# Patient Record
Sex: MALE | Race: BLACK OR AFRICAN AMERICAN
[De-identification: names, ages, dates, MRNs, and addresses within clinical notes are randomized per-mention and may not be internally consistent; named-entity substitution may affect disease eponyms.]

---

## 2017-01-20 NOTE — DIAGNOSTIC IMAGING REPORT
Indication: Chest pain



Technique:  Single portable AP view of the chest.



Findings:



Comparison:  10/21/16



The bones and extra pulmonary soft tissues, cardiomediastinal silhouette, 

pulmonary

vasculature and parenchyma, and pleural surfaces remain unremarkable.



IMPRESSION:



Negative portable AP chest, unchanged.

## 2017-01-20 NOTE — EMERGENCY ROOM REPORT
History of Present Illness


General


Chief Complaint:  Chest Pain


Source:  Patient, Medical Record





Present Illness


HPI


51-year-old male presents ED complaining of chest pain earache states symptoms 

started this morning while asleep, woke him up.  Pain is sharp, midsternal, 10 

out of 10.  Worse with movement.  Admits to shortness of breath.  No other 

aggravating relieving factors.  Patient states he also "passed out".  Patient 

has history of heart disease and syncope.  Denies smoking or drug use.  PMD is 

Dr. Bryson.


Allergies:  


Coded Allergies:  


     KETOROLAC (Unverified  Adverse Reaction, Intermediate, n/v, 1/12/15)


Uncoded Allergies:  


     TORODOL (Allergy, Unknown, 10/21/16)





Patient History


Past Medical History:  HTN, CVA/TIA


Past Surgical History:  other - spinal fusion


Pertinent Family History:  none


Social History:  Denies: alcohol use, drug use, smoking


Immunizations:  UTD


Reviewed Nursing Documentation:  PMH: Agreed, PSxH: Agreed





Nursing Documentation-PMH


Past Medical History:  No History, Except For


Hx Cardiac Problems:  Yes


Hx Hypertension:  Yes


Hx Cancer:  No


Hx Gastrointestinal Problems:  No


Hx Neurological Problems:  Yes - spinal fusion


Hx Cerebrovascular Accident:  Yes - spinal fusion and stroke 2011 (left sided 

deficit)


Hx Neurologic Surgery:  Yes - brain surgery at [facility]





Review of Systems


All Other Systems:  negative except mentioned in HPI





Physical Exam





Vital Signs








  Date Time  Temp Pulse Resp B/P Pulse Ox O2 Delivery O2 Flow Rate FiO2


 


1/20/17 12:15 98.1 70 16 179/102 99 Room Air  








Sp02 EP Interpretation:  reviewed, normal


General Appearance:  no apparent distress, alert, GCS 15, non-toxic


Head:  normocephalic, atraumatic


Eyes:  bilateral eye PERRL, bilateral eye normal inspection


ENT:  hearing grossly normal, normal pharynx, no angioedema, normal voice


Neck:  full range of motion, supple/symm/no masses


Respiratory:  chest non-tender, lungs clear, normal breath sounds, speaking 

full sentences


Cardiovascular #1:  regular rate, rhythm, no edema


Cardiovascular #2:  2+ carotid (R), 2+ carotid (L), 2+ radial (R), 2+ radial (L)

, 2+ dorsalis pedis (R), 2+ dorsalis pedis (L)


Gastrointestinal:  normal bowel sounds, non tender, soft, non-distended, no 

guarding, no rebound


Rectal:  deferred


Genitourinary:  normal inspection, no CVA tenderness


Musculoskeletal:  back normal, gait/station normal, normal range of motion, non-

tender


Neurologic:  alert, oriented x3, responsive, motor strength/tone normal, 

sensory intact, speech normal


Psychiatric:  judgement/insight normal, memory normal, mood/affect normal, no 

suicidal/homicidal ideation


Reflexes:  3+ bicep (R), 3+ bicep (L), 3+ tricep (R), 3+ tricep (L), 3+ knee (R)

, 3+ knee (L)


Skin:  normal color, no rash, warm/dry, well hydrated


Lymphatic:  no adenopathy





Medical Decision Making


Diagnostic Impression:  


 Primary Impression:  


 ACS (acute coronary syndrome)


 Additional Impression:  


 Syncope


 Qualified Codes:  R55 - Syncope and collapse


ER Course


Hospital Course 


51-year-old male presents ED complaining of chest pain, syncope





Differential diagnoses include: MI/unstable angina, contusion, muscle strain, 

PTX, rib fracture





Clinical course


Patient placed on stretcher. on cardiac monitor. After initial history and 

physical I ordered labs, EKG, chest x-ray, ASA, morphine 





labs reviewed- no leukocytosis, hb/hct stable, electrolytes ok, trop negative





Chest x-ray- no acute process





Given risk factors and presentation I believe patient should be admitted.





Case discussed with Dr. Bryson and he agreed to accept the patient to his 

service for further care and support





I.  I feel this is a highly complex case requiring extensive working including 

EKG/Rhythm strip, Xray/CT/US, Blood/urine lab work, repeat exams while in ED, 

and administration of strong opiates/narcotics for pain control, admission to 

hospital or close patient follow up.  





Diagnosis - ACS, syncope





admitted to telemetry in serious condition





Labs








Test


  1/20/17


12:48


 


White Blood Count


  7.9 K/UL


(4.8-10.8)


 


Red Blood Count


  4.94 M/UL


(4.70-6.10)


 


Hemoglobin


  14.7 G/DL


(14.2-18.0)


 


Hematocrit


  46.3 %


(42.0-52.0)


 


Mean Corpuscular Volume 94 FL (80-99) 


 


Mean Corpuscular Hemoglobin


  29.7 PG


(27.0-31.0)


 


Mean Corpuscular Hemoglobin


Concent 31.7 G/DL


(32.0-36.0)


 


Red Cell Distribution Width


  13.3 %


(11.6-14.8)


 


Platelet Count


  192 K/UL


(150-450)


 


Mean Platelet Volume


  10.9 FL


(6.5-10.1)


 


Neutrophils (%) (Auto)


  72.2 %


(45.0-75.0)


 


Lymphocytes (%) (Auto)


  17.1 %


(20.0-45.0)


 


Monocytes (%) (Auto)


  5.5 %


(1.0-10.0)


 


Eosinophils (%) (Auto)


  3.9 %


(0.0-3.0)


 


Basophils (%) (Auto)


  1.3 %


(0.0-2.0)


 


Sodium Level


  140 mEQ/L


(135-145)


 


Potassium Level


  3.9 mEQ/L


(3.4-4.9)


 


Chloride Level


  99 mEQ/L


()


 


Carbon Dioxide Level


  26 mEQ/L


(20-30)


 


Anion Gap 15 (5-15) 


 


Blood Urea Nitrogen


  12 mg/dL


(7-23)


 


Creatinine


  1.7 mg/dL


(0.7-1.2)


 


Estimat Glomerular Filtration


Rate 52.2 mL/min


(>60)


 


Glucose Level


  107 mg/dL


()


 


Calcium Level


  9.7 mg/dL


(8.6-10.2)


 


Total Bilirubin


  0.6 mg/dL


(0.0-1.2)


 


Aspartate Amino Transf


(AST/SGOT) 14 U/L (5-40) 


 


 


Alanine Aminotransferase


(ALT/SGPT) 9 U/L (3-41) 


 


 


Alkaline Phosphatase


  35 U/L


()


 


Total Creatine Kinase


  316 U/L


()


 


Creatine Kinase MB


  1.9 ng/mL (<


6.7)


 


Creatine Kinase MB Relative


Index 0.6 


 


 


Troponin I


  < 0.30 ng/mL


(<=0.30)


 


Pro-B-Type Natriuretic Peptide


  261 pg/mL


(0-125)


 


Total Protein


  7.8 g/dL


(6.6-8.7)


 


Albumin


  4.8 g/dL


(3.5-5.2)


 


Globulin 3.0 g/dL 


 


Albumin/Globulin Ratio 1.6 (1.0-2.7) 


 


Urine Opiates Screen


  Negative


(NEGATIVE)


 


Urine Barbiturates Screen


  Negative


(NEGATIVE)


 


Phencyclidine (PCP) Screen


  Negative


(NEGATIVE)


 


Urine Amphetamines Screen


  Negative


(NEGATIVE)


 


Urine Benzodiazepines Screen


  Negative


(NEGATIVE)


 


Urine Cocaine Screen


  Negative


(NEGATIVE)


 


Urine Marijuana (THC) Screen


  Positive


(NEGATIVE)








EKG Diagnostic Results


Rate:  normal


Rhythm:  NSR


ST Segments:  no acute changes


ASA given to the pt in ED:  Yes





Rhythm Strip Diag. Results


EP Interpretation:  yes


Rhythm:  NSR, no PVC's, no ectopy





Chest X-Ray Diagnostic Results


EP Interpretation:  Yes


Findings:  no consolidation, no effusion, no pneumothorax, no acute 

cardiopulmonary disease


Number of Views:  1





Last Vital Signs








  Date Time  Temp Pulse Resp B/P Pulse Ox O2 Delivery O2 Flow Rate FiO2


 


1/20/17 13:45  66 18 175/106 99 Room Air  


 


1/20/17 12:15 98.1       








Status:  improved


Disposition:  ADMITTED AS INPATIENT


Condition:  Serious


Referrals:  


KHURRAM BRYSON (PCP)











JOCELYNN FU M.D. Jan 20, 2017 15:02

## 2017-01-21 NOTE — CARDIAC ELECTROPHYSIOLOGY PN
Subjective


Subjective


[id number]





Objective





Last 24 Hour Vital Signs








  Date Time  Temp Pulse Resp B/P Pulse Ox O2 Delivery O2 Flow Rate FiO2


 


1/21/17 16:00 98.1 64 20 139/94 99 Room Air  


 


1/21/17 12:55 97.7       


 


1/21/17 12:55 97.7       


 


1/21/17 12:00  61      


 


1/21/17 09:06    132/66    


 


1/21/17 08:00  75      


 


1/21/17 08:00 97.7 81 17 130/66 95 Room Air  


 


1/21/17 04:12 97.7 58 20 149/102 97 Room Air  


 


1/21/17 04:00  70      


 


1/21/17 01:21 98.0 70 20 150/100 98 Room Air  


 


1/20/17 20:00  65      

















Intake and Output  


 


 1/20/17 1/21/17





 19:00 07:00


 


Intake Total  60 ml


 


Balance  60 ml


 


  


 


Intake Oral  60 ml


 


# Voids 1 











Laboratory Tests








Test


  1/21/17


06:35 1/21/17


12:00


 


White Blood Count


  6.8 K/UL


(4.8-10.8) 


 


 


Red Blood Count


  4.57 M/UL


(4.70-6.10)  L 


 


 


Hemoglobin


  13.7 G/DL


(14.2-18.0)  L 


 


 


Hematocrit


  43.8 %


(42.0-52.0) 


 


 


Mean Corpuscular Volume 96 FL (80-99)   


 


Mean Corpuscular Hemoglobin


  30.0 PG


(27.0-31.0) 


 


 


Mean Corpuscular Hemoglobin


Concent 31.2 G/DL


(32.0-36.0)  L 


 


 


Red Cell Distribution Width


  13.0 %


(11.6-14.8) 


 


 


Platelet Count


  172 K/UL


(150-450) 


 


 


Mean Platelet Volume


  9.4 FL


(6.5-10.1) 


 


 


Neutrophils (%) (Auto)


  64.5 %


(45.0-75.0) 


 


 


Lymphocytes (%) (Auto)


  22.8 %


(20.0-45.0) 


 


 


Monocytes (%) (Auto)


  6.2 %


(1.0-10.0) 


 


 


Eosinophils (%) (Auto)


  5.8 %


(0.0-3.0)  H 


 


 


Basophils (%) (Auto)


  0.8 %


(0.0-2.0) 


 


 


Sodium Level


  144 mEQ/L


(135-145) 


 


 


Potassium Level


  4.3 mEQ/L


(3.4-4.9) 


 


 


Chloride Level


  102 mEQ/L


() 


 


 


Carbon Dioxide Level


  27 mEQ/L


(20-30) 


 


 


Anion Gap 15 (5-15)   


 


Blood Urea Nitrogen


  12 mg/dL


(7-23) 


 


 


Creatinine


  1.6 mg/dL


(0.7-1.2)  H 


 


 


Estimat Glomerular Filtration


Rate 56.0 mL/min


(>60) 


 


 


Glucose Level


  91 mg/dL


() 


 


 


Calcium Level


  9.4 mg/dL


(8.6-10.2) 


 


 


Troponin I


  < 0.30 ng/mL


(<=0.30) < 0.30 ng/mL


(<=0.30)

















RAMÓN LOPEZ Jan 21, 2017 17:40

## 2017-01-21 NOTE — CONSULTATION
DATE OF CONSULTATION:  01/21/2017



PULMONARY CONSULTATION



HISTORY OF PRESENT ILLNESS:  This is a 51-year-old male who came to

the hospital with chest pain.  He also reports he has earache. The patient

states that this rattling in his chest and discomfort woke him up from

sleep.  He also states he passed out.



PAST MEDICAL HISTORY:  Hypertension, CVA, spinal fusion.



PAST SURGICAL HISTORY:  He previously had tracheostomy in 2011 after

a complicated stroke.



LIST OF MEDICATIONS:  Include Neurontin, Protonix, lisinopril,

aspirin, Naprosyn, and tramadol.



ALLERGIES:  Listed to Toradol.



SOCIAL HISTORY:  Lives at home.  Denies alcohol tobacco use.  He has

a part-time caregiver.



REVIEW OF SYSTEMS:  Denies any headaches, hematemesis, melena,

hematochezia, night sweats, or weight loss.



PHYSICAL EXAMINATION:

GENERAL:  Reveals a 51-year-old male

VITAL SIGNS:  Blood pressure 130/60, heart rate 94, respiration 18,

afebrile, O2 saturation 95% on room air.

HEENT:  Unremarkable.  He has a well-healed tracheostomy scar on his

upper chest.

CHEST:  Clear breath sounds bilaterally.

ABDOMEN:  Soft. There is no edema.

EXTREMITIES:  His left upper and left lower extremity appear to be

weak with 4+/5 power.



LABORATORY AND DIAGNOSTIC DATA:  Normal CBC.  BMP is also

unremarkable except creatinine 1.6.  Toxicology screen positive for

marijuana.  Chest x-ray chest was obtained yesterday which shows clear

lungs bilaterally.



IMPRESSION:

1. Chest pain, atypical rule out acute coronary syndrome.

2. History of previous tracheostomy.

3. History of previous cerebrovascular accident with left hemiparesis,

partial.

4. Marijuana use.



DISCUSSION:  The patient is doing well from a medical/pulmonary

standpoint, saturating well on room air.  At this point, I do not suspect

infectious process or need for acute pulmonary intervention.  We will

follow as pulmonologist.



Thank you for the consultation.









  ______________________________________________

  Blair Mckinley M.D.





DR:  Geovani

D:  01/21/2017 09:11

T:  01/21/2017 19:15

JOB#:  [id number]

CC:

## 2017-01-21 NOTE — NEPHROLOGY PROGRESS NOTE
Assessment/Plan


Problem List:  


(1) Back complaints


(2) back


(3) Fall


(4) Renal failure (ARF), acute on chronic


(5) Syncope


(6) ACS (acute coronary syndrome)


Plan


nephrology consult dictation # [id number]





Subjective


Constitutional:  Denies: chills, diaphoresis, fever, malaise, no symptoms, other

, weakness


HEENT:  Denies: blurred vision, double vision, ear discharge, ear pain, eye pain

, mouth pain, mouth swelling, no symptoms, nose congestion, nose pain, other, 

tearing, throat pain, throat swelling


Genitourinary:  Denies: burning, discharge, flank pain, frequency, hematuria, 

incontinence, no symptoms, other, pain, urgency


Neurologic/Psychiatric:  Denies: anxiety, depressed, emotional problems, 

headache, no symptoms, numbness, other, paresthesia, pre-existing deficit, 

seizure, tingling, tremors, weakness





Objective


Objective





Last 24 Hour Vital Signs








  Date Time  Temp Pulse Resp B/P Pulse Ox O2 Delivery O2 Flow Rate FiO2


 


1/21/17 09:06    132/66    


 


1/21/17 08:00  75      


 


1/21/17 08:00 97.7 81 17 130/66 95 Room Air  


 


1/21/17 06:47 98.2       


 


1/21/17 04:12 97.7 58 20 149/102 97 Room Air  


 


1/21/17 04:00  70      


 


1/21/17 01:21 98.0 70 20 150/100 98 Room Air  


 


1/20/17 20:00  65      


 


1/20/17 17:08 99.0 74 15 152/103 99 Room Air  


 


1/20/17 16:59 99.0 74 15 152/103 99 Room Air  


 


1/20/17 15:26 99.3       


 


1/20/17 15:23 99.3 75 17 132/108 98 Room Air  


 


1/20/17 13:45  66 18 175/106 99 Room Air  

















Intake and Output  


 


 1/20/17 1/21/17





 19:00 07:00


 


Intake Total  60 ml


 


Balance  60 ml


 


  


 


Intake Oral  60 ml


 


# Voids 1 








Laboratory Tests


1/21/17 06:35: 


White Blood Count 6.8, Red Blood Count 4.57L, Hemoglobin 13.7L, Hematocrit 43.8

, Mean Corpuscular Volume 96, Mean Corpuscular Hemoglobin 30.0, Mean 

Corpuscular Hemoglobin Concent 31.2L, Red Cell Distribution Width 13.0, 

Platelet Count 172, Mean Platelet Volume 9.4, Neutrophils (%) (Auto) 64.5, 

Lymphocytes (%) (Auto) 22.8, Monocytes (%) (Auto) 6.2, Eosinophils (%) (Auto) 

5.8H, Basophils (%) (Auto) 0.8, Sodium Level 144, Potassium Level 4.3, Chloride 

Level 102, Carbon Dioxide Level 27, Anion Gap 15, Blood Urea Nitrogen 12, 

Creatinine 1.6H, Estimat Glomerular Filtration Rate 56.0, Glucose Level 91, 

Calcium Level 9.4, Troponin I < 0.30


1/21/17 12:00: Troponin I < 0.30


Height (Feet):  5


Height (Inches):  6.00


Weight (Pounds):  182











Dahlia Smith N.P. Jan 21, 2017 13:04

## 2017-01-22 NOTE — NEPHROLOGY PROGRESS NOTE
Assessment/Plan


Problem List:  


(1) Back complaints


(2) back


(3) Fall


(4) Renal failure (ARF), acute on chronic


(5) Syncope


(6) ACS (acute coronary syndrome)


(7) Chest pain


(8) HTN (hypertension)


Assessment


Discussed with Dr Nguyen


Plan


Pain management 


Cardio and pulmo f/u - will f/u with recs


Stable lytes


Monitor creatinine


Monitor BP


AM labs





Subjective


Subjective


In bed asleep, c/o chest pain last night, stable with dilaudid





Objective


Objective





Last 24 Hour Vital Signs








  Date Time  Temp Pulse Resp B/P Pulse Ox O2 Delivery O2 Flow Rate FiO2


 


1/22/17 09:33    152/98    


 


1/22/17 08:00  62      


 


1/22/17 03:46  57      


 


1/22/17 00:00 96.4 52 20 152/98 93 Room Air  


 


1/21/17 23:44  59      


 


1/21/17 20:49  60 20 156/94 100 Room Air  


 


1/21/17 20:00 98.0 66 21 175/100 100 Room Air  


 


1/21/17 19:57  68      


 


1/21/17 18:58 98.1       


 


1/21/17 18:58 98.1       


 


1/21/17 18:28    139/94    


 


1/21/17 16:00  70      


 


1/21/17 16:00 98.1 64 20 139/94 99 Room Air  


 


1/21/17 12:00  61      

















Intake and Output  


 


 1/21/17 1/22/17





 19:00 07:00


 


Intake Total 1040 ml 


 


Output Total 550 ml 600 ml


 


Balance 490 ml -600 ml


 


  


 


Intake Oral 1040 ml 


 


Output Urine Total 550 ml 600 ml


 


# Voids 3 1








Laboratory Tests


1/21/17 12:00: Troponin I < 0.30


1/21/17 20:00: Troponin I < 0.30


1/22/17 04:20: 


Troponin I < 0.30, White Blood Count 7.6, Red Blood Count 4.79, Hemoglobin 14.2

, Hematocrit 45.8, Mean Corpuscular Volume 96, Mean Corpuscular Hemoglobin 29.6

, Mean Corpuscular Hemoglobin Concent 31.0L, Red Cell Distribution Width 13.5, 

Platelet Count 199, Mean Platelet Volume 10.2H, Neutrophils (%) (Auto) 52.7, 

Lymphocytes (%) (Auto) 31.2, Monocytes (%) (Auto) 6.5, Eosinophils (%) (Auto) 

7.7H, Basophils (%) (Auto) 1.9, Sodium Level 139, Potassium Level 4.4, Chloride 

Level 101, Carbon Dioxide Level 23, Anion Gap 15, Blood Urea Nitrogen 12, 

Creatinine 1.7H, Estimat Glomerular Filtration Rate 52.2, Glucose Level 95, 

Calcium Level 9.2


Height (Feet):  5


Height (Inches):  6.00


Weight (Pounds):  182


General Appearance:  no apparent distress, alert


EENT:  normal ENT inspection


Neck:  normal alignment, supple


Cardiovascular:  normal rate, regular rhythm


Respiratory/Chest:  lungs clear, normal breath sounds, no respiratory distress


Abdomen:  non tender, soft, no organomegaly, no mass


Extremities:  non-tender, normal inspection, no calf tenderness


Neurologic:  alert, oriented x 3, responsive, normal mood/affect











Dahlia Smith N.P. Jan 22, 2017 10:21

## 2017-01-22 NOTE — CONSULTATION
DATE OF CONSULTATION:



CARDIOLOGY CONSULTATION



REFERRING PHYSICIAN:  Tank Lam M.D.



REASON FOR CONSULTATION:  Chest pain and syncope.



HISTORY OF PRESENT ILLNESS:  The patient is a 51-year-old 

American gentleman with history of hypertension and history of CVA, came

to the emergency room complaining of chest pain while he was asleep.  The

pain woke him up __________ was 10/10.  The patient also had shortness of

breath.  The patient also stated that subsequently he passed out and he

stated he had history of syncope in the past.  The patient was admitted

and Cardiology consultation was obtained for further evaluation and

management.



REVIEW OF SYSTEMS:  Negative other than what was mentioned in the

history of present illness.



PAST MEDICAL HISTORY:

1. Hypertension.

2. History of CVA in 2011 with left-sided deficit.

3. History of brain surgery at [facility].

4. History of spinal fusion.



FAMILY HISTORY:  Noncontributory.



PHYSICAL EXAMINATION:

VITAL SIGNS:  Blood pressure is 139/94, pulse 64, respirations 20,

and he is afebrile.

HEAD AND NECK:  Shows no JVD.

LUNGS:  Clear.

CARDIOVASCULAR:  Shows regular S1 and S2 with no gallop or murmur.

ABDOMEN:  Soft and nontender.

EXTREMITIES:  No pitting edema.



LABORATORY AND DIAGNOSTIC DATA:  Labs showed white count of 6.8,

hemoglobin 13.7, hematocrit 42.0, and platelet count 172,000.  Troponin is

negative x3.  Sodium 144, potassium 4.2, BUN of 16, creatinine 1.6, and

glucose of 91.  BNP is 261.  Urine toxicology screen is positive for

marijuana.



IMPRESSION AND PLAN:

1. Chest pain.  The patient was ruled out for myocardial infarction.  We

will get an echocardiogram and schedule for nuclear stress test in the

morning for further evaluation.

2. Orthostatic syncope.  The patient was found to have similar episodes

in the past.

3. Renal insufficiency.

4. Hypertension.  Continue the patient on lisinopril 10 mg twice a

day.

5. __________.



Thank you very much, Dr. Lam, for allowing me to participate in the

care of this patient.  Please do not hesitate to contact me for any

questions regarding my evaluation.









  ______________________________________________

  Anirudh Crouch M.D.





DR:  TORIBIO

D:  01/21/2017 17:42

T:  01/22/2017 04:40

JOB#:  [id number]

CC:

## 2017-01-22 NOTE — PULMONOLOGY PROGRESS NOTE
Assessment/Plan


Assessment/Plan


1. Chest pain, rule out acute coronary syndrome.


2. History of previous tracheostomy.


3. History of previous cerebrovascular accident with left hemiparesis,


partial.


4. Marijuana use.





DISCUSSION:  The patient is doing well from a medical/pulmonary


standpoint, saturating well on room air.  At this point, I do not suspect


infectious process or need for acute pulmonary intervention.  I will


follow as pulmonologist.





Thank you for the consultation.





Subjective


Interval Events:  No new events


Constitutional:  Reports: no symptoms


HEENT:  Repors: no symptoms


Respiratory:  Reports: no symptoms


Cardiovascular:  Reports: no symptoms


Gastrointestinal/Abdominal:  Reports: no symptoms


Genitourinary:  Reports: no symptoms


Allergies:  


Coded Allergies:  


     KETOROLAC (Unverified  Adverse Reaction, Intermediate, n/v, 1/12/15)


Uncoded Allergies:  


     TORODOL (Allergy, Unknown, 10/21/16)





Objective





Last 24 Hour Vital Signs








  Date Time  Temp Pulse Resp B/P Pulse Ox O2 Delivery O2 Flow Rate FiO2


 


1/22/17 03:46  57      


 


1/22/17 00:00 96.4 52 20 152/98 93 Room Air  


 


1/21/17 23:44  59      


 


1/21/17 20:49  60 20 156/94 100 Room Air  


 


1/21/17 20:00 98.0 66 21 175/100 100 Room Air  


 


1/21/17 19:57  68      


 


1/21/17 18:58 98.1       


 


1/21/17 18:58 98.1       


 


1/21/17 18:28    139/94    


 


1/21/17 16:00  70      


 


1/21/17 16:00 98.1 64 20 139/94 99 Room Air  


 


1/21/17 12:00  61      


 


1/21/17 09:06    132/66    

















Intake and Output  


 


 1/21/17 1/22/17





 19:00 07:00


 


Intake Total 1040 ml 


 


Output Total 550 ml 600 ml


 


Balance 490 ml -600 ml


 


  


 


Intake Oral 1040 ml 


 


Output Urine Total 550 ml 600 ml


 


# Voids 3 1








General Appearance:  WD/WN


HEENT:  normocephalic


Respiratory/Chest:  chest wall non-tender, lungs clear


Cardiovascular:  normal peripheral pulses, normal rate


Abdomen:  normal bowel sounds


Laboratory Tests


1/21/17 12:00: Troponin I < 0.30


1/21/17 20:00: Troponin I < 0.30


1/22/17 04:20: 


Troponin I < 0.30, White Blood Count 7.6, Red Blood Count 4.79, Hemoglobin 14.2

, Hematocrit 45.8, Mean Corpuscular Volume 96, Mean Corpuscular Hemoglobin 29.6

, Mean Corpuscular Hemoglobin Concent 31.0L, Red Cell Distribution Width 13.5, 

Platelet Count 199, Mean Platelet Volume 10.2H, Neutrophils (%) (Auto) 52.7, 

Lymphocytes (%) (Auto) 31.2, Monocytes (%) (Auto) 6.5, Eosinophils (%) (Auto) 

7.7H, Basophils (%) (Auto) 1.9, Sodium Level 139, Potassium Level 4.4, Chloride 

Level 101, Carbon Dioxide Level 23, Anion Gap 15, Blood Urea Nitrogen 12, 

Creatinine 1.7H, Estimat Glomerular Filtration Rate 52.2, Glucose Level 95, 

Calcium Level 9.2





Current Medications








 Medications


  (Trade)  Dose


 Ordered  Sig/Rodney


 Route


 PRN Reason  Start Time


 Stop Time Status Last Admin


Dose Admin


 


 Aspirin


  (ASA)  325 mg  DAILY


 ORAL


   1/21/17 09:00


 2/20/17 08:59  1/21/17 09:02


 


 


 Dextrose


  (Dextrose 50%)    STAT  PRN


 IV


 Hypoglycemia  1/20/17 20:30


 2/19/17 20:29   


 


 


 Gabapentin


  (Neurontin)  100 mg  THREE TIMES A  DAY


 ORAL


   1/21/17 09:00


 2/20/17 08:59  1/21/17 18:28


 


 


 Hydromorphone HCl


  (Dilaudid)  1 mg  Q6H  PRN


 IVP


 Severe Pain (Pain Scale 7-10)  1/20/17 20:45


 1/27/17 20:44  1/22/17 06:01


 


 


 Lisinopril


  (Prinivil)  20 mg  BID


 ORAL


   1/21/17 09:00


 2/20/17 08:59  1/21/17 18:28


 


 


 Naproxen


  (Naprosyn)  500 mg  TIDPRN  PRN


 ORAL


 Mild Pain (Pain Scale 1-3)  1/20/17 20:45


 2/19/17 20:44  1/21/17 21:11


 


 


 Pantoprazole


  (Protonix)  40 mg  DAILY


 ORAL


   1/21/17 09:00


 2/20/17 08:59  1/21/17 09:04


 


 


 Tramadol HCl


  (Ultram)  50 mg  Q6H  PRN


 ORAL


 Moderate Pain (Pain Scale 4-6)  1/20/17 20:45


 1/27/17 20:44  1/21/17 21:04


 

















Blair Mckinley MD Jan 22, 2017 08:37

## 2017-01-23 NOTE — CARDIOLOGY REPORT
--------------- APPROVED REPORT --------------





EXAM: Two-dimensional and M-mode echocardiogram with Doppler and color 

Doppler.



INDICATION

Chest Pain 



M-Mode DIMENSIONS 

IVSd1.7 (0.7-1.1cm)Left Atrium (MM)3.5 (1.6-4.0cm)

LVDd4.3 (3.5-5.6cm)Aortic Root2.9 (2.0-3.7cm)

PWd1.4 (0.7-1.1cm)Aortic Cusp Exc.2.0 (1.5-2.0cm)



LVDs2.4 (2.5-4.0cm)

PWs1.7 cm





Normal left ventricular chamber size, systolic function and wall motion.

Left ventricular ejection fraction estimated to be 55-60%.

Mild left ventricular hypertrophy.

Anterior Echo-free space, may be due to pericardial fat or effusion.

All other cardiac chamber sizes are within normal limits.

Mild focal aortic valve sclerosis with adequate cusp excursion.

Mildly thickened mitral valve leaflets with normal excursion.

Mild mitral annulus and aortic root calcification.

Pulmonic valve not well visualized.

Normal tricuspid valve structure.

IVC dilated at 2.3cm with physiologic collapse.



A  color flow and spectral Doppler study was performed and revealed:

No aortic regurgitation.

Trace mitral regurgitation.

Mitral inflow indicate normal left ventricular diastolic function. 

Trace tricuspid regurgitation.

Tricuspid systolic velocities suggests peak right ventricular systolic pressure of 9 

mmHg.

Trace pulmonic regurgitation present.

## 2017-01-23 NOTE — PULMONOLOGY PROGRESS NOTE
Assessment/Plan


Assessment/Plan


1. Chest pain, rule out acute coronary syndrome.


2. History of previous tracheostomy.


3. History of previous cerebrovascular accident with left hemiparesis,


partial.


4. Marijuana use.





DISCUSSION:  


The patient is doing well from a pulmonary standpoint


He is saturating well on room air.  


I do not suspect infectious process or need for acute pulmonary intervention.  


I will follow as pulmonologist.





Subjective


Interval Events:  None


Constitutional:  Reports: no symptoms


HEENT:  Repors: no symptoms


Respiratory:  Reports: no symptoms


Cardiovascular:  Reports: no symptoms


Gastrointestinal/Abdominal:  Reports: no symptoms


Allergies:  


Coded Allergies:  


     KETOROLAC (Unverified  Adverse Reaction, Intermediate, n/v, 1/12/15)


Uncoded Allergies:  


     TORODOL (Allergy, Unknown, 10/21/16)





Objective





Last 24 Hour Vital Signs








  Date Time  Temp Pulse Resp B/P Pulse Ox O2 Delivery O2 Flow Rate FiO2


 


1/23/17 08:00 97.0 55 16 131/85 98 Room Air  


 


1/23/17 08:00  60      


 


1/23/17 04:00 97.7 70 20 125/88 95 Room Air  


 


1/23/17 03:57  88      


 


1/23/17 00:00 97.7 65 16 144/90 97 Room Air  


 


1/22/17 23:58  72      


 


1/22/17 20:00  65      


 


1/22/17 20:00 97.0 71 20 168/94 96 Room Air  


 


1/22/17 17:28    153/94    


 


1/22/17 16:00  60      


 


1/22/17 16:00 97.6 58 21 153/94 96 Room Air  


 


1/22/17 13:06 96.4       


 


1/22/17 12:00 97.0 52 20 134/92 100 Room Air  


 


1/22/17 09:33    152/98    

















Intake and Output  


 


 1/22/17 1/23/17





 19:00 07:00


 


Intake Total 450 ml 540 ml


 


Output Total  500 ml


 


Balance 450 ml 40 ml


 


  


 


Intake Oral 450 ml 540 ml


 


Output Urine Total  500 ml


 


# Voids 2 1








General Appearance:  WD/WN


HEENT:  normocephalic


Respiratory/Chest:  chest wall non-tender, lungs clear


Cardiovascular:  normal peripheral pulses, normal rate


Laboratory Tests


1/22/17 11:45: Troponin I < 0.30


1/22/17 20:05: Troponin I < 0.30


1/23/17 04:08: 


Troponin I < 0.30, White Blood Count 8.0, Red Blood Count 4.66L, Hemoglobin 14.2

, Hematocrit 44.8, Mean Corpuscular Volume 96, Mean Corpuscular Hemoglobin 30.4

, Mean Corpuscular Hemoglobin Concent 31.6L, Red Cell Distribution Width 13.5, 

Platelet Count 220, Mean Platelet Volume 9.2, Neutrophils (%) (Auto) 55.4, 

Lymphocytes (%) (Auto) 28.3, Monocytes (%) (Auto) 7.1, Eosinophils (%) (Auto) 

7.5H, Basophils (%) (Auto) 1.8, Sodium Level 143, Potassium Level 3.9, Chloride 

Level 105, Carbon Dioxide Level 22, Anion Gap 16H, Blood Urea Nitrogen 14, 

Creatinine 1.7H, Estimat Glomerular Filtration Rate 52.2, Glucose Level 98, 

Calcium Level 9.2





Current Medications








 Medications


  (Trade)  Dose


 Ordered  Sig/Rodney


 Route


 PRN Reason  Start Time


 Stop Time Status Last Admin


Dose Admin


 


 Aspirin


  (ASA)  325 mg  DAILY


 ORAL


   1/21/17 09:00


 2/20/17 08:59  1/22/17 09:33


 


 


 Dextrose


  (Dextrose 50%)    STAT  PRN


 IV


 Hypoglycemia  1/20/17 20:30


 2/19/17 20:29   


 


 


 Gabapentin


  (Neurontin)  100 mg  THREE TIMES A  DAY


 ORAL


   1/21/17 09:00


 2/20/17 08:59  1/22/17 17:28


 


 


 Hydromorphone HCl


  (Dilaudid)  1 mg  Q6H  PRN


 IVP


 Severe Pain (Pain Scale 7-10)  1/20/17 20:45


 1/27/17 20:44  1/23/17 07:10


 


 


 Lisinopril


  (Prinivil)  20 mg  BID


 ORAL


   1/21/17 09:00


 2/20/17 08:59  1/22/17 17:28


 


 


 Naproxen


  (Naprosyn)  500 mg  TIDPRN  PRN


 ORAL


 Mild Pain (Pain Scale 1-3)  1/20/17 20:45


 2/19/17 20:44  1/21/17 21:11


 


 


 Pantoprazole


  (Protonix)  40 mg  DAILY


 ORAL


   1/21/17 09:00


 2/20/17 08:59  1/22/17 09:33


 


 


 Tramadol HCl


  (Ultram)  50 mg  Q6H  PRN


 ORAL


 Moderate Pain (Pain Scale 4-6)  1/20/17 20:45


 1/27/17 20:44  1/22/17 16:26


 

















Blair Mckinley MD Jan 23, 2017 09:21

## 2017-01-23 NOTE — CARDIOLOGY REPORT
--------------- APPROVED REPORT --------------





EKG Measurement

Heart [id number]

VA 

[id number]

[id number]

[id number]

[id number]





Normal sinus rhythm

Normal ECG

## 2017-01-23 NOTE — NEPHROLOGY PROGRESS NOTE
Assessment/Plan


Problem List:  


(1) Back complaints


(2) back


(3) Fall


(4) Renal failure (ARF), acute on chronic


(5) Syncope


(6) ACS (acute coronary syndrome)


(7) Chest pain


(8) HTN (hypertension)


Assessment


Discussed with Dr Nguyen


Plan


Pain management 


Cardio and pulmo f/u - will f/u with recs


Stable lytes


Monitor creatinine


Monitor BP


AM labs





Subjective


Constitutional:  Denies: chills, diaphoresis, fever, malaise, no symptoms, other

, weakness


HEENT:  Denies: blurred vision, double vision, ear discharge, ear pain, eye pain

, mouth pain, mouth swelling, no symptoms, nose congestion, nose pain, other, 

tearing, throat pain, throat swelling


Genitourinary:  Denies: burning, discharge, flank pain, frequency, hematuria, 

incontinence, no symptoms, other, pain, urgency


Subjective


In bed, in no distress





Objective


Objective





Last 24 Hour Vital Signs








  Date Time  Temp Pulse Resp B/P Pulse Ox O2 Delivery O2 Flow Rate FiO2


 


1/23/17 12:00 97.2 66 17 148/80 95 Room Air 2.0 


 


1/23/17 12:00  68      


 


1/23/17 10:20 97.0       


 


1/23/17 09:22    131/85    


 


1/23/17 08:00 97.0 55 16 131/85 98 Room Air  


 


1/23/17 08:00  60      


 


1/23/17 07:40 97.0       


 


1/23/17 04:00 97.7 70 20 125/88 95 Room Air  


 


1/23/17 03:57  88      


 


1/23/17 00:00 97.7 65 16 144/90 97 Room Air  


 


1/22/17 23:58  72      


 


1/22/17 20:00  65      


 


1/22/17 20:00 97.0 71 20 168/94 96 Room Air  


 


1/22/17 17:28    153/94    


 


1/22/17 16:00  60      


 


1/22/17 16:00 97.6 58 21 153/94 96 Room Air  

















Intake and Output  


 


 1/22/17 1/23/17





 19:00 07:00


 


Intake Total 450 ml 540 ml


 


Output Total  500 ml


 


Balance 450 ml 40 ml


 


  


 


Intake Oral 450 ml 540 ml


 


Output Urine Total  500 ml


 


# Voids 2 1








Laboratory Tests


1/22/17 20:05: Troponin I < 0.30


1/23/17 04:08: 


Troponin I < 0.30, White Blood Count 8.0, Red Blood Count 4.66L, Hemoglobin 14.2

, Hematocrit 44.8, Mean Corpuscular Volume 96, Mean Corpuscular Hemoglobin 30.4

, Mean Corpuscular Hemoglobin Concent 31.6L, Red Cell Distribution Width 13.5, 

Platelet Count 220, Mean Platelet Volume 9.2, Neutrophils (%) (Auto) 55.4, 

Lymphocytes (%) (Auto) 28.3, Monocytes (%) (Auto) 7.1, Eosinophils (%) (Auto) 

7.5H, Basophils (%) (Auto) 1.8, Sodium Level 143, Potassium Level 3.9, Chloride 

Level 105, Carbon Dioxide Level 22, Anion Gap 16H, Blood Urea Nitrogen 14, 

Creatinine 1.7H, Estimat Glomerular Filtration Rate 52.2, Glucose Level 98, 

Calcium Level 9.2


1/23/17 11:55: Troponin I < 0.30


Height (Feet):  5


Height (Inches):  6.00


Weight (Pounds):  182


General Appearance:  no apparent distress, alert


EENT:  normal ENT inspection, TMs normal


Neck:  normal alignment, supple


Cardiovascular:  normal rate, regular rhythm, no JVD


Respiratory/Chest:  lungs clear, normal breath sounds, no respiratory distress


Abdomen:  non tender, soft, no organomegaly


Extremities:  normal range of motion, non-tender, normal inspection, no calf 

tenderness


Neurologic:  alert, oriented x 3, responsive, normal mood/affect











Dahlia Smith N.P. Jan 23, 2017 14:11

## 2017-01-23 NOTE — CARDIAC ELECTROPHYSIOLOGY PN
Assessment/Plan


Assessment/Plan


1. Chest pain.  The patient was ruled out for myocardial infarction. 

Echocardiogram showed EF 55-60%.Nuclear stress test report pending.


2. Orthostatic syncope.  


3. Renal insufficiency.Creatinine 1.7 unchanged despite Lisinopril 20 bid.


4. Hypertension.  Add Norvasc 5 mg daily.


5. Back pain.





LANG RN





Subjective


Subjective


Had adenosine cardiolite today Results still pending.No chest pain. BP still 

high.





Objective





Last 24 Hour Vital Signs








  Date Time  Temp Pulse Resp B/P Pulse Ox O2 Delivery O2 Flow Rate FiO2


 


1/23/17 17:01    150/71    


 


1/23/17 16:00 98.2 68 14 150/71 98 Room Air  


 


1/23/17 14:15 97.2       


 


1/23/17 14:15 97.2       


 


1/23/17 12:00 97.2 66 17 148/80 95 Room Air 2.0 


 


1/23/17 12:00  68      


 


1/23/17 09:22    131/85    


 


1/23/17 08:00 97.0 55 16 131/85 98 Room Air  


 


1/23/17 08:00  60      


 


1/23/17 04:00 97.7 70 20 125/88 95 Room Air  


 


1/23/17 03:57  88      


 


1/23/17 00:00 97.7 65 16 144/90 97 Room Air  


 


1/22/17 23:58  72      


 


1/22/17 20:00  65      


 


1/22/17 20:00 97.0 71 20 168/94 96 Room Air  

















Intake and Output  


 


 1/22/17 1/23/17





 19:00 07:00


 


Intake Total 450 ml 540 ml


 


Output Total  500 ml


 


Balance 450 ml 40 ml


 


  


 


Intake Oral 450 ml 540 ml


 


Output Urine Total  500 ml


 


# Voids 2 1











Laboratory Tests








Test


  1/22/17


20:05 1/23/17


04:08 1/23/17


11:55


 


Troponin I


  < 0.30 ng/mL


(<=0.30) < 0.30 ng/mL


(<=0.30) < 0.30 ng/mL


(<=0.30)


 


White Blood Count


  


  8.0 K/UL


(4.8-10.8) 


 


 


Red Blood Count


  


  4.66 M/UL


(4.70-6.10)  L 


 


 


Hemoglobin


  


  14.2 G/DL


(14.2-18.0) 


 


 


Hematocrit


  


  44.8 %


(42.0-52.0) 


 


 


Mean Corpuscular Volume  96 FL (80-99)   


 


Mean Corpuscular Hemoglobin


  


  30.4 PG


(27.0-31.0) 


 


 


Mean Corpuscular Hemoglobin


Concent 


  31.6 G/DL


(32.0-36.0)  L 


 


 


Red Cell Distribution Width


  


  13.5 %


(11.6-14.8) 


 


 


Platelet Count


  


  220 K/UL


(150-450) 


 


 


Mean Platelet Volume


  


  9.2 FL


(6.5-10.1) 


 


 


Neutrophils (%) (Auto)


  


  55.4 %


(45.0-75.0) 


 


 


Lymphocytes (%) (Auto)


  


  28.3 %


(20.0-45.0) 


 


 


Monocytes (%) (Auto)


  


  7.1 %


(1.0-10.0) 


 


 


Eosinophils (%) (Auto)


  


  7.5 %


(0.0-3.0)  H 


 


 


Basophils (%) (Auto)


  


  1.8 %


(0.0-2.0) 


 


 


Sodium Level


  


  143 mEQ/L


(135-145) 


 


 


Potassium Level


  


  3.9 mEQ/L


(3.4-4.9) 


 


 


Chloride Level


  


  105 mEQ/L


() 


 


 


Carbon Dioxide Level


  


  22 mEQ/L


(20-30) 


 


 


Anion Gap  16 (5-15)  H 


 


Blood Urea Nitrogen


  


  14 mg/dL


(7-23) 


 


 


Creatinine


  


  1.7 mg/dL


(0.7-1.2)  H 


 


 


Estimat Glomerular Filtration


Rate 


  52.2 mL/min


(>60) 


 


 


Glucose Level


  


  98 mg/dL


() 


 


 


Calcium Level


  


  9.2 mg/dL


(8.6-10.2) 


 








Objective


HEAD AND NECK:  Shows no JVD.


LUNGS:  Clear.


CARDIOVASCULAR:  Shows regular S1 and S2 with no gallop or murmur.


ABDOMEN:  Soft and nontender.


EXTREMITIES:  No pitting edema.











RAMÓN LOPEZ Jan 23, 2017 17:50

## 2017-01-24 NOTE — NEPHROLOGY PROGRESS NOTE
Assessment/Plan


Problem List:  


(1) HTN (hypertension)


(2) Chest pain


(3) ACS (acute coronary syndrome)


(4) Syncope


(5) Renal insufficiency


Plan


f/u stress test results. f/u cardio rec. 


d/c plan for am if ok with cardio,





Subjective


Subjective


no sob/cp. pending stress test.





Objective


Objective





Last 24 Hour Vital Signs








  Date Time  Temp Pulse Resp B/P Pulse Ox O2 Delivery O2 Flow Rate FiO2


 


1/24/17 20:00 97.8 67 21 156/105 98 Room Air  


 


1/24/17 20:00  67      


 


1/24/17 18:01    161/95    


 


1/24/17 16:00 97.7 63 20 161/95 99 Room Air  


 


1/24/17 16:00  68      


 


1/24/17 15:30 97.5       


 


1/24/17 14:26 97.5       


 


1/24/17 12:48 97.5 77 18 135/110 99 Room Air  


 


1/24/17 09:51 97.9       


 


1/24/17 09:33    149/91    


 


1/24/17 09:20  77  149/91    


 


1/24/17 08:14 97.9 77 18 149/91 99 Room Air  


 


1/24/17 07:50  72      


 


1/24/17 04:20  56      


 


1/24/17 04:00 97.7 59 20 128/76 98 Room Air  


 


1/24/17 00:00 98.2 74 20 132/87 98 Room Air  


 


1/23/17 23:44  68      

















Intake and Output  


 


 1/23/17 1/24/17





 19:00 07:00


 


Intake Total 420 ml 240 ml


 


Balance 420 ml 240 ml


 


  


 


Intake Oral 420 ml 240 ml


 


# Voids 3 4








Laboratory Tests


1/24/17 07:05: 


White Blood Count 7.2, Red Blood Count 4.54L, Hemoglobin 13.7L, Hematocrit 43.7

, Mean Corpuscular Volume 96, Mean Corpuscular Hemoglobin 30.2, Mean 

Corpuscular Hemoglobin Concent 31.4L, Red Cell Distribution Width 14.0, 

Platelet Count 191, Mean Platelet Volume 10.5H, Neutrophils (%) (Auto) 58.4, 

Lymphocytes (%) (Auto) 26.3, Monocytes (%) (Auto) 7.0, Eosinophils (%) (Auto) 

7.3H, Basophils (%) (Auto) 1.0, Sodium Level 143, Potassium Level 4.2, Chloride 

Level 101, Carbon Dioxide Level 28, Anion Gap 14, Blood Urea Nitrogen 13, 

Creatinine 1.7H, Estimat Glomerular Filtration Rate 52.2, Glucose Level 95, 

Calcium Level 9.6


Height (Feet):  5


Height (Inches):  6.00


Weight (Pounds):  182


General Appearance:  no apparent distress


Cardiovascular:  normal rate, regular rhythm


Respiratory/Chest:  lungs clear


Abdomen:  non tender, soft











ELLIE ALLRED Jan 24, 2017 23:00

## 2017-01-24 NOTE — PULMONOLOGY PROGRESS NOTE
Assessment/Plan


Assessment/Plan


1. Chest pain, rule out acute coronary syndrome.


2. History of previous tracheostomy.


3. History of previous cerebrovascular accident with left hemiparesis,


partial.


4. Marijuana use.





DISCUSSION:  


The patient is doing well from a pulmonary standpoint


He is saturating well on room air.  


I do not suspect infectious process or need for acute pulmonary intervention.  


I will follow as pulmonologist.


Cardiac workup noted





Subjective


Interval Events:  Ruled out for MI; echo noted; feelsOK


Constitutional:  Reports: no symptoms


HEENT:  Repors: no symptoms


Respiratory:  Reports: no symptoms


Cardiovascular:  Reports: no symptoms


Gastrointestinal/Abdominal:  Reports: no symptoms


Genitourinary:  Reports: no symptoms


Allergies:  


Coded Allergies:  


     KETOROLAC (Unverified  Adverse Reaction, Intermediate, n/v, 1/12/15)


Uncoded Allergies:  


     TORODOL (Allergy, Unknown, 10/21/16)





Objective





Last 24 Hour Vital Signs








  Date Time  Temp Pulse Resp B/P Pulse Ox O2 Delivery O2 Flow Rate FiO2


 


1/24/17 08:14 97.9 77 18 149/91 99 Room Air  


 


1/24/17 04:20  56      


 


1/24/17 04:00 97.7 59 20 128/76 98 Room Air  


 


1/24/17 02:37 98.2       


 


1/24/17 00:00 98.2 74 20 132/87 98 Room Air  


 


1/23/17 23:44  68      


 


1/23/17 20:00 98.8 66 15 148/111 95 Room Air  


 


1/23/17 20:00  64      


 


1/23/17 18:59  68  148/99    


 


1/23/17 17:01    150/71    


 


1/23/17 16:00  64      


 


1/23/17 16:00 98.2 68 14 150/71 98 Room Air  


 


1/23/17 14:15 97.2       


 


1/23/17 12:00 97.2 66 17 148/80 95 Room Air 2.0 


 


1/23/17 12:00  68      


 


1/23/17 09:22    131/85    

















Intake and Output  


 


 1/23/17 1/24/17





 19:00 07:00


 


Intake Total 420 ml 240 ml


 


Balance 420 ml 240 ml


 


  


 


Intake Oral 420 ml 240 ml


 


# Voids 3 4








General Appearance:  no acute distress


HEENT:  normocephalic


Respiratory/Chest:  chest wall non-tender, lungs clear


Cardiovascular:  normal peripheral pulses, normal rate


Abdomen:  normal bowel sounds, soft, non tender


Laboratory Tests


1/23/17 11:55: Troponin I < 0.30


1/23/17 20:05: Troponin I < 0.30


1/24/17 07:05: 


White Blood Count 7.2, Red Blood Count 4.54L, Hemoglobin 13.7L, Hematocrit 43.7

, Mean Corpuscular Volume 96, Mean Corpuscular Hemoglobin 30.2, Mean 

Corpuscular Hemoglobin Concent 31.4L, Red Cell Distribution Width 14.0, 

Platelet Count 191, Mean Platelet Volume 10.5H, Neutrophils (%) (Auto) 58.4, 

Lymphocytes (%) (Auto) 26.3, Monocytes (%) (Auto) 7.0, Eosinophils (%) (Auto) 

7.3H, Basophils (%) (Auto) 1.0, Sodium Level [Pending], Potassium Level [Pending

], Chloride Level [Pending], Carbon Dioxide Level [Pending], Blood Urea 

Nitrogen [Pending], Creatinine [Pending], Estimat Glomerular Filtration Rate [

Pending], Glucose Level [Pending], Calcium Level [Pending]





Current Medications








 Medications


  (Trade)  Dose


 Ordered  Sig/Rodney


 Route


 PRN Reason  Start Time


 Stop Time Status Last Admin


Dose Admin


 


 Amlodipine


 Besylate


  (Norvasc)  5 mg  DAILY


 ORAL


   1/23/17 18:00


 2/22/17 17:59  1/23/17 18:59


 


 


 Aspirin


  (ASA)  325 mg  DAILY


 ORAL


   1/21/17 09:00


 2/20/17 08:59  1/23/17 09:21


 


 


 Dextrose


  (Dextrose 50%)    STAT  PRN


 IV


 Hypoglycemia  1/20/17 20:30


 2/19/17 20:29   


 


 


 Gabapentin


  (Neurontin)  100 mg  THREE TIMES A  DAY


 ORAL


   1/21/17 09:00


 2/20/17 08:59  1/23/17 17:01


 


 


 Hydromorphone HCl


  (Dilaudid)  1 mg  Q6H  PRN


 IVP


 Severe Pain (Pain Scale 7-10)  1/20/17 20:45


 1/27/17 20:44  1/24/17 02:07


 


 


 Lisinopril


  (Prinivil)  20 mg  BID


 ORAL


   1/21/17 09:00


 2/20/17 08:59  1/23/17 17:01


 


 


 Naproxen


  (Naprosyn)  500 mg  TIDPRN  PRN


 ORAL


 Mild Pain (Pain Scale 1-3)  1/20/17 20:45


 2/19/17 20:44  1/21/17 21:11


 


 


 Pantoprazole


  (Protonix)  40 mg  DAILY


 ORAL


   1/21/17 09:00


 2/20/17 08:59  1/23/17 09:20


 


 


 Tramadol HCl


  (Ultram)  50 mg  Q6H  PRN


 ORAL


 Moderate Pain (Pain Scale 4-6)  1/20/17 20:45


 1/27/17 20:44  1/22/17 16:26


 

















Blair Mckinley MD Jan 24, 2017 08:41

## 2017-01-24 NOTE — CARDIAC ELECTROPHYSIOLOGY PN
Assessment/Plan


Assessment/Plan


1. Chest pain.  The patient was ruled out for myocardial infarction. 

Echocardiogram showed EF 55-60%.Nuclear stress test showed no ischemia.


2. Orthostatic syncope.  


3. Renal insufficiency.Creatinine 1.7 unchanged despite Lisinopril 20 bid.


4. Hypertension better with Norvasc 5 mg daily and Lisinopril 20 bid.


5. Back pain.





LANG RN


OK to DC from cardiac standpoint.





Subjective


Subjective


Had adenosine Cardiolite yesterday that showed no ischemia. No chest pain. BP 

better. No chest pain or SOB.





Objective





Last 24 Hour Vital Signs








  Date Time  Temp Pulse Resp B/P Pulse Ox O2 Delivery O2 Flow Rate FiO2


 


1/24/17 14:26 97.5       


 


1/24/17 12:48 97.5 77 18 135/110 99 Room Air  


 


1/24/17 09:51 97.9       


 


1/24/17 09:33    149/91    


 


1/24/17 09:20  77  149/91    


 


1/24/17 08:14 97.9 77 18 149/91 99 Room Air  


 


1/24/17 07:50  72      


 


1/24/17 04:20  56      


 


1/24/17 04:00 97.7 59 20 128/76 98 Room Air  


 


1/24/17 00:00 98.2 74 20 132/87 98 Room Air  


 


1/23/17 23:44  68      


 


1/23/17 20:00 98.8 66 15 148/111 95 Room Air  


 


1/23/17 20:00  64      


 


1/23/17 18:59  68  148/99    


 


1/23/17 17:01    150/71    


 


1/23/17 16:00  64      


 


1/23/17 16:00 98.2 68 14 150/71 98 Room Air  

















Intake and Output  


 


 1/23/17 1/24/17





 19:00 07:00


 


Intake Total 420 ml 240 ml


 


Balance 420 ml 240 ml


 


  


 


Intake Oral 420 ml 240 ml


 


# Voids 3 4











Laboratory Tests








Test


  1/23/17


20:05 1/24/17


07:05


 


Troponin I


  < 0.30 ng/mL


(<=0.30) 


 


 


White Blood Count


  


  7.2 K/UL


(4.8-10.8)


 


Red Blood Count


  


  4.54 M/UL


(4.70-6.10)  L


 


Hemoglobin


  


  13.7 G/DL


(14.2-18.0)  L


 


Hematocrit


  


  43.7 %


(42.0-52.0)


 


Mean Corpuscular Volume  96 FL (80-99)  


 


Mean Corpuscular Hemoglobin


  


  30.2 PG


(27.0-31.0)


 


Mean Corpuscular Hemoglobin


Concent 


  31.4 G/DL


(32.0-36.0)  L


 


Red Cell Distribution Width


  


  14.0 %


(11.6-14.8)


 


Platelet Count


  


  191 K/UL


(150-450)


 


Mean Platelet Volume


  


  10.5 FL


(6.5-10.1)  H


 


Neutrophils (%) (Auto)


  


  58.4 %


(45.0-75.0)


 


Lymphocytes (%) (Auto)


  


  26.3 %


(20.0-45.0)


 


Monocytes (%) (Auto)


  


  7.0 %


(1.0-10.0)


 


Eosinophils (%) (Auto)


  


  7.3 %


(0.0-3.0)  H


 


Basophils (%) (Auto)


  


  1.0 %


(0.0-2.0)


 


Sodium Level


  


  143 mEQ/L


(135-145)


 


Potassium Level


  


  4.2 mEQ/L


(3.4-4.9)


 


Chloride Level


  


  101 mEQ/L


()


 


Carbon Dioxide Level


  


  28 mEQ/L


(20-30)


 


Anion Gap  14 (5-15)  


 


Blood Urea Nitrogen


  


  13 mg/dL


(7-23)


 


Creatinine


  


  1.7 mg/dL


(0.7-1.2)  H


 


Estimat Glomerular Filtration


Rate 


  52.2 mL/min


(>60)


 


Glucose Level


  


  95 mg/dL


()


 


Calcium Level


  


  9.6 mg/dL


(8.6-10.2)








Objective


HEAD AND NECK:  Shows no JVD.


LUNGS:  Clear.


CARDIOVASCULAR:  Shows regular S1 and S2 with no gallop or murmur.


ABDOMEN:  Soft and nontender.


EXTREMITIES:  No pitting edema.











RAMÓN LOPEZ Jan 24, 2017 15:18

## 2017-01-24 NOTE — HISTORY AND PHYSICAL REPORT
DATE OF ADMISSION:  01/20/2017



HISTORY OF PRESENT ILLNESS:  This is a 51 years old 

male, came to the emergency room for chest pain and palpitation.  The

patient denies any fever or chills.



PAST MEDICAL HISTORY:  Significant for CVA, hypertension,

hyperlipidemia, and neuralgia.



ALLERGIES:  NKA.



MEDICATIONS:  See the list.



PHYSICAL EXAMINATION:

GENERAL:  This is a young  male, who came to the

emergency room for chest pain.  He has no fever.  No chills.

VITAL SIGNS:  Blood pressure is 130/70, pulse 74, and respirations

18.

SKIN:  Good skin turgor.

HEENT:  NAD.

CHEST:  Bilaterally clear.

CARDIOVASCULAR:  Regular rhythm.  No gallop.  No murmur.

ABDOMEN:  Soft.

EXTREMITIES:  No CCE.

NEUROLOGICAL:  Left-sided hemiparesis.



ASSESSMENT:

1. Acute coronary syndrome.

2. Hypertension.

3. Hyperlipidemia.

4. Cerebrovascular accident and the patient was admitted to rule out

myocardial infarction.  Cardiology __________.  Continue current medical

treatment.









  ______________________________________________

  Gunner Lam M.D.





DR:  JOANNE

D:  01/23/2017 18:59

T:  01/24/2017 00:12

JOB#:  [id number]

CC:

## 2017-01-25 NOTE — PULMONOLOGY PROGRESS NOTE
Assessment/Plan


Assessment/Plan


1. Chest pain, rule out acute coronary syndrome.


2. History of previous tracheostomy.


3. History of previous cerebrovascular accident with left hemiparesis,


partial.


4. Marijuana use.





DISCUSSION:  


The patient is doing well from a pulmonary standpoint


He is saturating well on room air.  


I do not suspect infectious process or need for acute pulmonary intervention.  


I will follow as pulmonologist.


Cardiac workup noted





Subjective


Interval Events:  No new events


Constitutional:  Reports: no symptoms


HEENT:  Repors: no symptoms


Respiratory:  Reports: no symptoms


Allergies:  


Coded Allergies:  


     KETOROLAC (Unverified  Adverse Reaction, Intermediate, n/v, 1/12/15)


Uncoded Allergies:  


     TORODOL (Allergy, Unknown, 10/21/16)





Objective





Last 24 Hour Vital Signs








  Date Time  Temp Pulse Resp B/P Pulse Ox O2 Delivery O2 Flow Rate FiO2


 


1/25/17 09:10  70  145/97    


 


1/25/17 09:10    145/97    


 


1/25/17 08:31 97.7 70 18 145/97 97 Room Air  


 


1/25/17 08:00  66      


 


1/25/17 04:31 98.0 66 20 135/85 98 Room Air  


 


1/25/17 04:00  55      


 


1/25/17 00:28 98.0 56 20 137/86 99 Room Air  


 


1/25/17 00:00  62      


 


1/24/17 20:00 97.8 67 21 156/105 98 Room Air  


 


1/24/17 20:00  67      


 


1/24/17 18:01    161/95    


 


1/24/17 16:00 97.7 63 20 161/95 99 Room Air  


 


1/24/17 16:00  68      


 


1/24/17 15:30 97.5       


 


1/24/17 14:26 97.5       


 


1/24/17 12:48 97.5 77 18 135/110 99 Room Air  


 


1/24/17 09:51 97.9       


 


1/24/17 09:33    149/91    

















Intake and Output  


 


 1/24/17 1/25/17





 19:00 07:00


 


Intake Total 240 ml 300 ml


 


Output Total 700 ml 1100 ml


 


Balance -460 ml -800 ml


 


  


 


Intake Oral 240 ml 300 ml


 


Output Urine Total 700 ml 1100 ml


 


# Voids  2


 


# Bowel Movements 2 








General Appearance:  WD/WN


HEENT:  normocephalic


Respiratory/Chest:  chest wall non-tender, lungs clear


Cardiovascular:  normal peripheral pulses, normal rate


Abdomen:  normal bowel sounds, soft, non tender





Current Medications








 Medications


  (Trade)  Dose


 Ordered  Sig/Rodney


 Route


 PRN Reason  Start Time


 Stop Time Status Last Admin


Dose Admin


 


 Amlodipine


 Besylate


  (Norvasc)  5 mg  DAILY


 ORAL


   1/23/17 18:00


 2/22/17 17:59  1/25/17 09:10


 


 


 Aspirin


  (ASA)  325 mg  DAILY


 ORAL


   1/21/17 09:00


 2/20/17 08:59  1/25/17 09:09


 


 


 Dextrose


  (Dextrose 50%)    STAT  PRN


 IV


 Hypoglycemia  1/20/17 20:30


 2/19/17 20:29   


 


 


 Gabapentin


  (Neurontin)  100 mg  THREE TIMES A  DAY


 ORAL


   1/21/17 09:00


 2/20/17 08:59  1/25/17 09:09


 


 


 Hydromorphone HCl


  (Dilaudid)  1 mg  Q4H  PRN


 IVP


 Severe Pain (Pain Scale 7-10)  1/24/17 13:45


 1/31/17 13:44  1/25/17 07:49


 


 


 Lisinopril


  (Prinivil)  20 mg  BID


 ORAL


   1/21/17 09:00


 2/20/17 08:59  1/25/17 09:10


 


 


 Naproxen


  (Naprosyn)  500 mg  TIDPRN  PRN


 ORAL


 Mild Pain (Pain Scale 1-3)  1/20/17 20:45


 2/19/17 20:44  1/21/17 21:11


 


 


 Pantoprazole


  (Protonix)  40 mg  DAILY


 ORAL


   1/21/17 09:00


 2/20/17 08:59  1/25/17 09:09


 


 


 Tramadol HCl


  (Ultram)  50 mg  Q6H  PRN


 ORAL


 Moderate Pain (Pain Scale 4-6)  1/20/17 20:45


 1/27/17 20:44  1/22/17 16:26


 

















Blair Mckinley MD Jan 25, 2017 09:31

## 2017-01-25 NOTE — NEPHROLOGY PROGRESS NOTE
Assessment/Plan


Problem List:  


(1) Back complaints


(2) back


(3) Fall


(4) Renal failure (ARF), acute on chronic


(5) Syncope


(6) ACS (acute coronary syndrome)


(7) Chest pain


(8) HTN (hypertension)


Assessment


Discussed with Dr Nguyen


Plan


Pain management 


Cardio and pulmo f/u - will f/u with recs


Stable lytes


Monitor creatinine


Monitor BP


DC plan - home





Subjective


Constitutional:  Denies: chills, diaphoresis, fever, malaise, no symptoms, other

, weakness


HEENT:  Denies: blurred vision, double vision, ear discharge, ear pain, eye pain

, mouth pain, mouth swelling, no symptoms, nose congestion, nose pain, other, 

tearing, throat pain, throat swelling


Genitourinary:  Denies: burning, discharge, flank pain, frequency, hematuria, 

incontinence, no symptoms, other, pain, urgency


Neurologic/Psychiatric:  Denies: anxiety, depressed, emotional problems, 

headache, no symptoms, numbness, other, paresthesia, pre-existing deficit, 

seizure, tingling, tremors, weakness


Subjective


In bed, in no distress





Objective


Objective





Last 24 Hour Vital Signs








  Date Time  Temp Pulse Resp B/P Pulse Ox O2 Delivery O2 Flow Rate FiO2


 


1/25/17 04:31 98.0 66 20 135/85 98 Room Air  


 


1/25/17 04:00  55      


 


1/25/17 00:28 98.0 56 20 137/86 99 Room Air  


 


1/25/17 00:00  62      


 


1/24/17 20:00 97.8 67 21 156/105 98 Room Air  


 


1/24/17 20:00  67      


 


1/24/17 18:01    161/95    


 


1/24/17 16:00 97.7 63 20 161/95 99 Room Air  


 


1/24/17 16:00  68      


 


1/24/17 15:30 97.5       


 


1/24/17 14:26 97.5       


 


1/24/17 12:48 97.5 77 18 135/110 99 Room Air  


 


1/24/17 09:51 97.9       


 


1/24/17 09:33    149/91    


 


1/24/17 09:20  77  149/91    

















Intake and Output  


 


 1/24/17 1/25/17





 19:00 07:00


 


Intake Total 240 ml 300 ml


 


Output Total 700 ml 1100 ml


 


Balance -460 ml -800 ml


 


  


 


Intake Oral 240 ml 300 ml


 


Output Urine Total 700 ml 1100 ml


 


# Voids  2


 


# Bowel Movements 2 








Height (Feet):  5


Height (Inches):  6.00


Weight (Pounds):  182


General Appearance:  no apparent distress, alert


EENT:  normal ENT inspection


Neck:  normal alignment


Cardiovascular:  normal rate, regular rhythm, no JVD


Respiratory/Chest:  lungs clear, normal breath sounds


Extremities:  non-tender, normal inspection


Neurologic:  alert, oriented x 3, responsive, normal mood/affect











Dahlia Smith N.P. Jan 25, 2017 08:16

## 2017-01-25 NOTE — DIAGNOSTIC IMAGING REPORT
Indication:  chest pain



Technique: The study was conducted under the supervision of a cardiologist.

Adenosine infusion followed by intravenous administration of 31.8 mCi of technetium

99m Myoview was performed. Three plane SPECT imaging of the heart was then

performed. A resting study was performed as part of the one-day protocol with 11.1

mCi of technetium 99m myoview injected intravenously at that time. Three plane 

SPECT

imaging of the heart was obtained.



Comparison: None



Clinical data:



1. Clinical response:  Non ischemic

2. Electrocardiographic response: Non ischemic



Findings:



The myocardial perfusion scan demonstrates a questionable fixed inferolateral

perfusion defect. A small myocardial infarct may be present. Please correlate

clinically. No reversible perfusion defects are identified. LVEF is estimated at

69%.



Impression:



Questionable small inferolateral infarct. No evidence of myocardial ischemia. 

Please

correlate clinically.

## 2017-01-26 NOTE — DISCHARGE SUMMARY
Discharge Summary


Hospital Course


Date of Admission


Jan 20, 2017 at 14:35


Date of Discharge


Jan 25, 2017 at 13:00


Admitting Diagnosis


Syncope/ACS


HPI


Stef Feliciano is a 51 year old male who was admitted on Jan 20, 2017 at 14:35 

for Syncope, Acute Coronary Syndrome


Hospital Course


[id number]





Discharge


Discharge Disposition


Patient was discharged to Home (01)


Discharge Diagnoses:  











Lizbeth Santa NP Jan 26, 2017 20:32

## 2017-01-26 NOTE — DIAGNOSTIC IMAGING REPORT
--------------- APPROVED REPORT --------------





CPT Code: 29499



Present Symptoms

Comments: R/O DVT





BILATERAL: Imaging reveals a patent deep venous system bilaterally. There is no evidence 

of thrombus within the femoral, popliteal or tibial segments. The greater saphenous veins 

are also within normal limits. Doppler indicates normal spontaneous flow within these 

segments.

## 2017-01-27 NOTE — DISCHARGE SUMMARY 2 SIG
DATE OF ADMISSION:  01/20/2017



DATE OF DISCHARGE:  01/25/2017



CONSULTANTS:

1. Blair Mckinley M.D.

2. Anirudh Crouch M.D.



BRIEF HOSPITAL COURSE:  The patient is a 51-year-old male who

presented to emergency department for complaints of chest pain that woke

him up at night.  The pain was described to be sharp, midsternal, and

10/10 with shortness of breath, worse with movement.  He has history of

heart disease.  On evaluation at ED, chest x-ray showed no acute process.

Blood pressure was elevated to 179/102 and due to the patient's risk

factors, the patient was admitted for cardiac monitoring.  Troponin was

negative.  Dr. Crouch was consulted.  Echocardiogram done showed left

ventricular ejection fraction of 55% to 60% with mild left ventricular

hypertrophy.  Blood pressure was treated with Norvasc and lisinopril.  He

underwent a nuclear stress test.  Results were nonischemic.  He presented

with acute on chronic renal failure.  Electrolytes have been stable.  The

patient was eventually discharged home.



FINAL DIAGNOSES:

1. Acute on chronic renal failure.

2. Hypertension.

3. Orthostatic syncope.

4. Back pain.

5. Chest pain, myocardial infarction was ruled out.

6. Marijuana use.

7. Fall.

8. History of previous tracheostomy.

9. Old cerebrovascular accident with left hemiparesis.







  ______________________________________________

  Luciano Nguyen M.D.





I have been assigned to dictate discharge summary on this account and I

was not involved in the patient's management.



  ______________________________________________

  Lizbeth Santa N.P.





DR:  DAFNE

D:  01/26/2017 20:34

T:  01/27/2017 02:56

JOB#:  [id number]

CC:



SOL

## 2017-05-24 NOTE — DIAGNOSTIC IMAGING REPORT
Indication: Chest Pain



Comparison:  1/20/17



A single view chest radiograph was obtained.



Findings:



Cardiomediastinal appearance is within normal limits for age.  Pulmonary vascularity

is appropriate. The diaphragmatic contour is smooth and costophrenic angles are

sharp. No pleural effusions are identified. The bones are unremarkable. Cervical

fusion plate noted and partially seen on this study.



Impression: No acute findings

## 2017-05-24 NOTE — EMERGENCY ROOM REPORT
History of Present Illness


General


Chief Complaint:  Chest Pain


Source:  Patient





Present Illness


HPI





51YOM BIBEMS with chest pain since 9am when he woke up.  7 hours prior.  Pain 

is constant, left sided. "Worse with any movement." Denies assoc fever/chills, 

sob, cough, abd pain, nausea/vomiting.


Patient states "I had a heart attack last week."  States he had angioplasty but 

didnt have stents placed.  Not on Plavix or ASA currently. Only on BP med but 

doesnt know name.  Told me to "check in your computer."  Denies ETOH, smoking, 

drugs.  





Endorses 2X previous CVA with left sided weakness.





EMS rhythm strip showed no ischemia.  No ST depressions or elevation.


NSR


Was given ASA and 3x nitro spray with no improvement in pain





Per EMR - multiple visits here for same complaint


Jan 2017 EF was 55-60%


Nuclear stress test negative for ischemia


Since 2015, multiple troponins.  Only one slightly positive 0.70 troponin 8/31 

but was in setting of JALEEL on known CKD





Spoke to Charge RN at [facility]


Patient admitted 5/15 to ICU for "acute MI".  Troponin 0.04 then 0.33.  

Documented ECGs x2 from ED physician showed "normal sinus rhythm, no ischemia."


Medical records faxed from [facility]: 4x ECG show isolated TWI in Leads 3 and AVF.


CT chest was negative.


Cardiac cath: CAD


- left coronary: sclerosis in distal left main with maximal 30% distal left 

main stenosis, sclerosis in LAD


- right coronary: diffuse sclerosis in mid portion; high degree stenosis in 

distal portion





Recommendation was for medical conservatory therapy


No stents were placed


Allergies:  


Coded Allergies:  


     KETOROLAC (Unverified  Adverse Reaction, Intermediate, n/v, 1/12/15)


Uncoded Allergies:  


     TORODOL (Allergy, Unknown, 10/21/16)





Patient History


Past Medical History:  HTN


Past Surgical History:  none


Pertinent Family History:  none


Social History:  Denies: alcohol use, drug use, smoking


Immunizations:  UTD


Reviewed Nursing Documentation:  PMH: Agreed, PSxH: Agreed





Nursing Documentation-PMH


Past Medical History:  No History, Except For


Hx Hypertension:  Yes


Hx Cerebrovascular Accident:  Yes - 2011, 2017 left side weakness





Review of Systems


All Other Systems:  negative except mentioned in HPI





Physical Exam





Vital Signs








  Date Time  Temp Pulse Resp B/P Pulse Ox O2 Delivery O2 Flow Rate FiO2


 


5/24/17 15:41  92 16 147/89 98 Room Air  








Sp02 EP Interpretation:  reviewed, normal


General Appearance:  normal inspection, well appearing, no apparent distress, 

alert, GCS 15, non-toxic


Head:  normocephalic, atraumatic


Eyes:  bilateral eye EOMI, bilateral eye PERRL


ENT:  normal ENT inspection, hearing grossly normal, normal voice


Neck:  normal inspection, full range of motion, supple, no bony tend


Respiratory:  normal inspection, lungs clear, normal breath sounds, no 

respiratory distress, no retraction, no accessory muscle use, no wheezing, 

speaking full sentences, other - Chest pain hyper-reproducible on exam, chest 

symmetrical


Cardiovascular #1:  regular rate, rhythm, no edema


Gastrointestinal:  normal inspection, normal bowel sounds, non tender, soft, no 

guarding, no hernia


Genitourinary:  no CVA tenderness


Musculoskeletal:  normal inspection, back normal, normal range of motion, Nelson'

s Sign negative


Neurologic:  normal inspection, alert, oriented x3, responsive, CNs III-XII nml 

as tested, motor strength/tone normal, speech normal


Psychiatric:  normal inspection, judgement/insight normal, mood/affect normal


Skin:  normal inspection, normal color, no rash


Lymphatic:  normal inspection





Medical Decision Making


Diagnostic Impression:  


 Primary Impression:  


 Chest pain


 Qualified Codes:  R07.89 - Other chest pain


ER Course


51YOM with chest pain for 9 hours


VSS. Afebrile.


- ECG shows same isolated TWI in 3 and AVF that were seen on 4x ECG at [facility]


- Troponin 0 here.


- CXR no PTX, PNA


- Continued pain - was given nitro in the ED.


- Patient states he was DCed from [facility] without any Rx.


- Given high degree stenosis of distal RCA and need for nitro to relieve pain 

in ED, warrants tele admission


- Endorsed to Dr Lam at 545pm for tele admit.


EKG Diagnostic Results


Rate:  normal, other - TWI in 3 and AVF


Rhythm:  NSR


ST Segments:  no acute changes


ASA given to the pt in ED:  No





Rhythm Strip Diag. Results


EP Interpretation:  yes


Rate:  67


Rhythm:  NSR, no PVC's, no ectopy





Chest X-Ray Diagnostic Results


EP Interpretation:  Yes


Findings:  no consolidation, no effusion, no pneumothorax, no acute 

cardiopulmonary disease


Number of Views:  1





Last Vital Signs








  Date Time  Temp Pulse Resp B/P Pulse Ox O2 Delivery O2 Flow Rate FiO2


 


5/24/17 15:41  92 16 147/89 98 Room Air  








Status:  improved


Disposition:  ADMITTED AS INPATIENT


Condition:  Serious











HAYDEN DAVEY M.D. May 24, 2017 15:52

## 2017-05-25 NOTE — CONSULTATION
DATE OF CONSULTATION:  05/25/2017



PULMONARY CONSULTATION



HISTORY OF PRESENT ILLNESS:  This is a 51-year-old male who came to

the hospital with chest pain.  This occurred yesterday.  It was worse with

movement and left sided.  He denied any fevers, cough, or chills.  The

patient states he has had a previous myocardial infarction and also states

he had angioplasty, but does not recall having a stent placed.  The

patient presently is not on antiplatelet agent.



PAST MEDICAL HISTORY:  Notable for previous CVA, history of CAD,

history of cardiac events including myocardial infarction, and status past

angioplasty.  Per ER physician's note, the patient's last coronary

catheterization done at [facility] has shown nonobstructive CAD with

hig-grade stenosis in distal portion.



ALLERGIES:  To Toradol.



SOCIAL HISTORY:  He denies alcohol or tobacco use.



REVIEW OF SYSTEMS:  Denies any headaches, hematemesis, melena, or

hematochezia.



PHYSICAL EXAMINATION:

GENERAL:  Reveals a 51-year-old male.

VITAL SIGNS:  Blood pressure 140/80, heart rate 84, respirations 18,

and afebrile.

HEENT:  Unremarkable.

LUNGS:  Breath sounds clear bilaterally.

ABDOMEN:  soft.

EXTREMITIES:  There is no edema.



DIAGNOSTIC DATA:  EKG shows inverted T-waves in lead III and aVF,

unchanged from previous.  X-ray of the chest is negative.



IMPRESSION:

1. Atypical chest pain.

2. History of right coronary artery occlusive disease.



DISCUSSION:  I suspect the pain is either the musculoskeletal or

cardiac origin.  I do not suspect a pulmonary reason for his chest pain.

We will follow as needed.









  ______________________________________________

  Blair Mckinley M.D.





DR:  ASHLY

D:  05/25/2017 14:14

T:  05/25/2017 23:25

JOB#:  [id number]

CC:

## 2017-05-26 NOTE — INTERNAL MED PROGRESS NOTE
Subjective


Physician Name


Ever Esqueda


Attending Physician


Luciano Nguyen





Current Medications








 Medications


  (Trade)  Dose


 Ordered  Sig/Rodney


 Route


 PRN Reason  Start Time


 Stop Time Status Last Admin


Dose Admin


 


 Acetaminophen/


 Hydrocodone Bitart


  (Norco 10/325)  1 ea  Q4H  PRN


 ORAL


 Moderate Pain (Pain Scale 4-6)  5/24/17 22:15


 5/31/17 22:14   


 


 


 Allopurinol


  (Zyloprim)  100 mg  DAILY


 ORAL


   5/25/17 09:00


 6/24/17 08:59  5/26/17 08:30


 


 


 Amlodipine


 Besylate


  (Norvasc)  2.5 mg  DAILY


 ORAL


   5/25/17 09:00


 6/24/17 08:59  5/26/17 08:31


 


 


 Atorvastatin


 Calcium


  (Lipitor)  20 mg  BEDTIME


 ORAL


   5/26/17 21:00


 6/25/17 20:59   


 


 


 Clonidine HCl


  (Catapres)  0.1 mg  Q6HR  PRN


 ORAL


 For High Blood Pressure  5/24/17 22:15


 6/23/17 22:14   


 


 


 Gabapentin


  (Neurontin)  800 mg  THREE TIMES A  DAY


 ORAL


   5/25/17 09:00


 6/24/17 08:59  5/26/17 17:01


 


 


 Heparin Sodium


  (Porcine)


  (Heparin 5000


 units/ml)  5,000 units  EVERY 8  HOURS


 SUBQ


   5/25/17 06:00


 6/24/17 05:59  5/26/17 14:29


 


 


 Hydrochlorothiazide


  (Hydrodiuril)  25 mg  DAILY


 ORAL


   5/25/17 09:00


 6/24/17 08:59  5/26/17 08:30


 


 


 Hydromorphone HCl


  (Dilaudid)  1 mg  Q4H  PRN


 IVP


 Severe Pain (Pain Scale 7-10)  5/25/17 21:45


 6/1/17 21:44  5/26/17 17:02


 


 


 Lisinopril


  (Prinivil)  20 mg  DAILY


 ORAL


   5/25/17 09:00


 6/24/17 08:59  5/26/17 08:31


 


 


 Nitroglycerin


  (Ntg)  0.4 mg  Q5M  PRN


 SL


 Prn Chest Pain  5/24/17 18:00


 6/23/17 17:59  5/24/17 18:13


 


 


 Ondansetron HCl


  (Zofran)  4 mg  Q6H  PRN


 IVP


 Nausea & Vomiting  5/24/17 22:15


 6/23/17 22:14   


 


 


 Pantoprazole


  (Protonix)  40 mg  DAILY


 ORAL


   5/25/17 09:00


 6/24/17 08:59  5/26/17 08:31


 


 


 Zolpidem Tartrate


  (Ambien)  5 mg  HSPRN  PRN


 ORAL


 Insomnia  5/25/17 21:45


 6/24/17 21:44  5/25/17 21:53


 








Allergies:  


Coded Allergies:  


     KETOROLAC (Unverified  Adverse Reaction, Intermediate, n/v, 1/12/15)


Uncoded Allergies:  


     TORODOL (Allergy, Unknown, 10/21/16)


Subjective


awake, alert, responsive, mild SOB, Chest wall pain





Objective





Last Vital Signs








  Date Time  Temp Pulse Resp B/P Pulse Ox O2 Delivery O2 Flow Rate FiO2


 


5/26/17 16:00  70      


 


5/26/17 15:15 97.9  18 135/92 99 Room Air  


 


5/25/17 13:22        21











Microbiology








 Date/Time


Source Procedure


Growth Status


 


 


 5/24/17 17:59


Nasal Nares MRSA Culture - Final


NO METHICILLIN RESISTANT STAPH AUREUS... Complete

















Intake and Output  


 


 5/25/17 5/26/17





 19:00 07:00


 


Output Total  1600 ml


 


Balance  -1600 ml


 


  


 


Output Urine Total  1600 ml


 


# Voids  2








Objective


General: No acute distress, awake and alert


HEENT: NCAT, sclera anicteric, PERRL, EOMI.


Neck: Supple, no significant jugular venous distention, 


Lungs: fair inspiratory effort, no accessory muscle use, clear to auscultation 

bilaterally, no Wheeze or Rales.


Heart: Regular rate and rhythm, normal S1/S2, no murmurs


Abdomen: soft, nontender, nondistended. Normoactive bowel sounds.


 / Rectal: Refused and deferred.


Extremities: No Cyanosis , clubbing or edema. 


Neuro: A&O x 3, Left side weakness


Skin: warm, no rashes or lesions


Psych: Normal mood and affect





Assessment/Plan


Assessment/Plan


(1) Hypertension


(2) CKD stage 2


(3) Degenerative disc disease, cervical


(4) Back pain


(5) NSTEMI (non-ST elevated myocardial infarction)


(6) Chronic pain


(7) old R MCA ischemic stroke with left side hemiparesis


(8) Head ache


(9) Chest wall pain


(10) ACS (acute coronary syndrome)





Plan: 


F/U with Dr. Crouch recommendation,


Monitor Labs


full code


Heparin SQ











Ever Esqueda MD May 26, 2017 18:32

## 2017-05-26 NOTE — HISTORY AND PHYSICAL
History of Present Illness


General


Reason for Hospitalization:  Chest Pain





Present Illness


HPI


The patient is a 51-year-old male with a PMHx significant for HTN, MI, s/p 

CARDIAC CATH, who presented to the hospital with chest pain which started the 

day of admission. He stated that he was recently admitted to [facility] 

for chest pain and had a cardiac cath. Pain is worse with movement and is left 

sided.  He denied any fevers, cough, or chills, no N/V, no abdominal pain.


Allergies:  


Coded Allergies:  


     KETOROLAC (Unverified  Adverse Reaction, Intermediate, n/v, 1/12/15)


Uncoded Allergies:  


     TORODOL (Allergy, Unknown, 10/21/16)





Medication History


Scheduled


Allopurinol* (Allopurinol*), 100 MG ORAL DAILY, (Reported)


Amlodipine Besylate* (Amlodipine Besylate*), 2.5 MG ORAL DAILY, (Reported)


Gabapentin* (Gabapentin*), 800 MG ORAL THREE TIMES A DAY, (Reported)


Lisinopril/Hydrochlorothiazide 20-12.5 Mg Tab (Lisinopril-Hctz 20-12.5 Mg Tab), 

1 TAB ORAL DAILY, (Reported)


Omeprazole (Omeprazole), 20 MG PO DAILY, (Reported)





Scheduled PRN


Clonidine Hcl* (Catapres*), 0.1 MG ORAL DAILY PRN for For High Blood Pressure, (

Reported)





Discontinued Medications


Amitriptyline HCl (Amitriptyline HCl), 1 GM MC, (Reported)


   Discontinued Reason: Medication dose changed


Amitriptyline HCl (Elavil*), 25 MG ORAL DAILY, (Reported)


   Discontinued Reason: Pt stopped taking med


Aspirin Ec* (Aspirin Ec*), 81 MG ORAL DAILY, (Reported)


   Discontinued Reason: MD discontinued med


Atorvastatin (Lipitor), 80 MG ORAL BID, (Reported)


   Discontinued Reason: Pt stopped taking med


Clonidine Hcl (Clonidine Hcl), 0.1 MG PO DAILY, (Reported)


   Discontinued Reason: Medication dose changed


Duloxetine Hcl* (Cymbalta*), 60 MG ORAL DAILY, (Reported)


   Discontinued Reason: Pt stopped taking med


Hydrocodone Bit/Acetaminophen 5-325* (Norco 5-325*), 1 TAB ORAL THREE TIMES A 

DAY PRN for For Pain, (Reported)


   Discontinued Reason: MD discontinued med


Methocarbamol* (Robaxin*), 500 MG PO QID, (Reported)


   Discontinued Reason: MD discontinued med


Naproxen* (Naproxen*), 500 MG ORAL TID, (Reported)


   Discontinued Reason: MD discontinued med


Pregabalin* (Lyrica*), 75 MG ORAL THREE TIMES A DAY, (Reported)


   Discontinued Reason: MD discontinued med


Tramadol Hcl* (Ultram*), 50 MG ORAL Q6H PRN for For Pain


   Discontinued Reason: MD discontinued med





Patient History


History Provided By:  Patient


Healthcare decision maker


pt A&Ox4


Resuscitation status


Full Code


Advanced Directive on File








Past Medical/Surgical History


Past Medical/Surgical History:  


(1) Hypertension


(2) History of cerebellar stroke


(3) Degenerative disc disease, cervical


(4) Back pain


(5) NSTEMI (non-ST elevated myocardial infarction)


(6) Chronic pain


(7) old R MCA ischemic stroke


(8) Head ache


(9) Chest wall pain


(10) ACS (acute coronary syndrome)





Social History


Social History:  


(1) Does not smoke


(2) No illicit drug use


(3) No history of alcohol use





Review of Systems


All Other Systems:  negative except mentioned in HPI





Physical Exam


General Appearance:  no apparent distress, alert


Lines, tubes and drains:  peripheral


HEENT:  atraumatic


Neck:  normal alignment, supple


Respiratory/Chest:  lungs clear, normal breath sounds, no respiratory distress


Cardiovascular/Chest:  normal rate, regular rhythm, no JVD


Abdomen:  soft, no organomegaly, no mass


Extremities:  non-tender, normal inspection, no calf tenderness


Skin Exam:  normal pigmentation, warm/dry


Neurologic:  alert, oriented x 3, responsive, normal mood/affect





Last 24 Hour Vital Signs








  Date Time  Temp Pulse Resp B/P Pulse Ox O2 Delivery O2 Flow Rate FiO2


 


5/26/17 11:37 97.3 74 18 124/89 97 Room Air  


 


5/26/17 08:31    120/69    


 


5/26/17 08:31  70  120/69    


 


5/26/17 08:11 97.0 70 18 120/69 96 Room Air  


 


5/26/17 08:00  74      


 


5/26/17 04:10 97.7 66 20 116/78 98 Room Air  


 


5/26/17 04:00  72      


 


5/26/17 00:00 97.0 67 20 130/76 100 Room Air  


 


5/26/17 00:00  72      


 


5/25/17 20:00  77      


 


5/25/17 20:00 98.1 76 20 122/77 96 Room Air  


 


5/25/17 16:00 96.0 64 18 141/76 96 Room Air  


 


5/25/17 16:00  61      

















Intake and Output  


 


 5/25/17 5/26/17





 19:00 07:00


 


Output Total  1600 ml


 


Balance  -1600 ml


 


  


 


Output Urine Total  1600 ml


 


# Voids  2








Height (Feet):  5


Height (Inches):  6.00


Weight (Pounds):  173


Medications





Current Medications








 Medications


  (Trade)  Dose


 Ordered  Sig/Rodney


 Route


 PRN Reason  Start Time


 Stop Time Status Last Admin


Dose Admin


 


 Acetaminophen/


 Hydrocodone Bitart


  (Norco 10/325)  1 ea  Q4H  PRN


 ORAL


 Moderate Pain (Pain Scale 4-6)  5/24/17 22:15


 5/31/17 22:14   


 


 


 Allopurinol


  (Zyloprim)  100 mg  DAILY


 ORAL


   5/25/17 09:00


 6/24/17 08:59  5/26/17 08:30


 


 


 Amlodipine


 Besylate


  (Norvasc)  2.5 mg  DAILY


 ORAL


   5/25/17 09:00


 6/24/17 08:59  5/26/17 08:31


 


 


 Clonidine HCl


  (Catapres)  0.1 mg  Q6HR  PRN


 ORAL


 For High Blood Pressure  5/24/17 22:15


 6/23/17 22:14   


 


 


 Gabapentin


  (Neurontin)  800 mg  THREE TIMES A  DAY


 ORAL


   5/25/17 09:00


 6/24/17 08:59  5/26/17 12:45


 


 


 Heparin Sodium


  (Porcine)


  (Heparin 5000


 units/ml)  5,000 units  EVERY 8  HOURS


 SUBQ


   5/25/17 06:00


 6/24/17 05:59  5/26/17 05:50


 


 


 Hydrochlorothiazide


  (Hydrodiuril)  25 mg  DAILY


 ORAL


   5/25/17 09:00


 6/24/17 08:59  5/26/17 08:30


 


 


 Hydromorphone HCl


  (Dilaudid)  1 mg  Q4H  PRN


 IVP


 Severe Pain (Pain Scale 7-10)  5/25/17 21:45


 6/1/17 21:44  5/26/17 12:45


 


 


 Lisinopril


  (Prinivil)  20 mg  DAILY


 ORAL


   5/25/17 09:00


 6/24/17 08:59  5/26/17 08:31


 


 


 Nitroglycerin


  (Ntg)  0.4 mg  Q5M  PRN


 SL


 Prn Chest Pain  5/24/17 18:00


 6/23/17 17:59  5/24/17 18:13


 


 


 Ondansetron HCl


  (Zofran)  4 mg  Q6H  PRN


 IVP


 Nausea & Vomiting  5/24/17 22:15


 6/23/17 22:14   


 


 


 Pantoprazole


  (Protonix)  40 mg  DAILY


 ORAL


   5/25/17 09:00


 6/24/17 08:59  5/26/17 08:31


 


 


 Zolpidem Tartrate


  (Ambien)  5 mg  HSPRN  PRN


 ORAL


 Insomnia  5/25/17 21:45


 6/24/17 21:44  5/25/17 21:53


 











Assessment/Plan


Problem List:  


(1) Chronic pain


ICD Codes:  G89.29 - Other chronic pain


SNOMED:  42452943


(2) Hypertension


ICD Codes:  I10 - Essential (primary) hypertension


SNOMED:  19216165


(3) ACS (acute coronary syndrome)


ICD Codes:  I24.9 - Acute ischemic heart disease, unspecified


SNOMED:  619865141


(4) Chest pain


ICD Codes:  R07.9 - Chest pain, unspecified


SNOMED:  12731552


Qualifiers:  


   Qualified Codes:  R07.89 - Other chest pain


Status:  stable


Assessment/Plan


Pain management


Monitor lytes, correct as needed


Cardiology consult


Pulmo following


AM labs











Dahlia Smith N.P. May 26, 2017 14:14

## 2017-05-26 NOTE — PULMONOLOGY PROGRESS NOTE
Assessment/Plan


Assessment/Plan


IMPRESSION:


1. Atypical chest pain.


2. History of right coronary artery occlusive disease.





DISCUSSION:  I suspect the pain is either the musculoskeletal or


cardiac origin.  I do not suspect a pulmonary reason for his chest pain.


I will follow as needed.





Subjective


Interval Events:  Continues to complain of chest pain


Constitutional:  Reports: no symptoms


HEENT:  Repors: no symptoms


Respiratory:  Reports: pleuritic pain


Cardiovascular:  Reports: no symptoms


Gastrointestinal/Abdominal:  Reports: no symptoms


Allergies:  


Coded Allergies:  


     KETOROLAC (Unverified  Adverse Reaction, Intermediate, n/v, 1/12/15)


Uncoded Allergies:  


     TORODOL (Allergy, Unknown, 10/21/16)





Objective





Last 24 Hour Vital Signs








  Date Time  Temp Pulse Resp B/P Pulse Ox O2 Delivery O2 Flow Rate FiO2


 


5/26/17 15:15 97.9 67 18 135/92 99 Room Air  


 


5/26/17 12:00  72      


 


5/26/17 11:37 97.3 74 18 124/89 97 Room Air  


 


5/26/17 08:31    120/69    


 


5/26/17 08:31  70  120/69    


 


5/26/17 08:11 97.0 70 18 120/69 96 Room Air  


 


5/26/17 08:00  74      


 


5/26/17 04:10 97.7 66 20 116/78 98 Room Air  


 


5/26/17 04:00  72      


 


5/26/17 00:00 97.0 67 20 130/76 100 Room Air  


 


5/26/17 00:00  72      


 


5/25/17 20:00  77      


 


5/25/17 20:00 98.1 76 20 122/77 96 Room Air  


 


5/25/17 16:00 96.0 64 18 141/76 96 Room Air  


 


5/25/17 16:00  61      

















Intake and Output  


 


 5/25/17 5/26/17





 19:00 07:00


 


Output Total  1600 ml


 


Balance  -1600 ml


 


  


 


Output Urine Total  1600 ml


 


# Voids  2








General Appearance:  no acute distress


HEENT:  normocephalic


Respiratory/Chest:  chest wall non-tender, lungs clear


Cardiovascular:  normal peripheral pulses, normal rate


Abdomen:  normal bowel sounds





Microbiology








 Date/Time


Source Procedure


Growth Status


 


 


 5/24/17 17:59


Nasal Nares MRSA Culture - Final


NO METHICILLIN RESISTANT STAPH AUREUS... Complete











Current Medications








 Medications


  (Trade)  Dose


 Ordered  Sig/Rodney


 Route


 PRN Reason  Start Time


 Stop Time Status Last Admin


Dose Admin


 


 Acetaminophen/


 Hydrocodone Bitart


  (Norco 10/325)  1 ea  Q4H  PRN


 ORAL


 Moderate Pain (Pain Scale 4-6)  5/24/17 22:15


 5/31/17 22:14   


 


 


 Allopurinol


  (Zyloprim)  100 mg  DAILY


 ORAL


   5/25/17 09:00


 6/24/17 08:59  5/26/17 08:30


 


 


 Amlodipine


 Besylate


  (Norvasc)  2.5 mg  DAILY


 ORAL


   5/25/17 09:00


 6/24/17 08:59  5/26/17 08:31


 


 


 Atorvastatin


 Calcium


  (Lipitor)  20 mg  BEDTIME


 ORAL


   5/26/17 21:00


 6/25/17 20:59   


 


 


 Clonidine HCl


  (Catapres)  0.1 mg  Q6HR  PRN


 ORAL


 For High Blood Pressure  5/24/17 22:15


 6/23/17 22:14   


 


 


 Gabapentin


  (Neurontin)  800 mg  THREE TIMES A  DAY


 ORAL


   5/25/17 09:00


 6/24/17 08:59  5/26/17 12:45


 


 


 Heparin Sodium


  (Porcine)


  (Heparin 5000


 units/ml)  5,000 units  EVERY 8  HOURS


 SUBQ


   5/25/17 06:00


 6/24/17 05:59  5/26/17 14:29


 


 


 Hydrochlorothiazide


  (Hydrodiuril)  25 mg  DAILY


 ORAL


   5/25/17 09:00


 6/24/17 08:59  5/26/17 08:30


 


 


 Hydromorphone HCl


  (Dilaudid)  1 mg  Q4H  PRN


 IVP


 Severe Pain (Pain Scale 7-10)  5/25/17 21:45


 6/1/17 21:44  5/26/17 12:45


 


 


 Lisinopril


  (Prinivil)  20 mg  DAILY


 ORAL


   5/25/17 09:00


 6/24/17 08:59  5/26/17 08:31


 


 


 Nitroglycerin


  (Ntg)  0.4 mg  Q5M  PRN


 SL


 Prn Chest Pain  5/24/17 18:00


 6/23/17 17:59  5/24/17 18:13


 


 


 Ondansetron HCl


  (Zofran)  4 mg  Q6H  PRN


 IVP


 Nausea & Vomiting  5/24/17 22:15


 6/23/17 22:14   


 


 


 Pantoprazole


  (Protonix)  40 mg  DAILY


 ORAL


   5/25/17 09:00


 6/24/17 08:59  5/26/17 08:31


 


 


 Zolpidem Tartrate


  (Ambien)  5 mg  HSPRN  PRN


 ORAL


 Insomnia  5/25/17 21:45


 6/24/17 21:44  5/25/17 21:53


 

















Blair Mckinley MD May 26, 2017 15:28

## 2017-05-26 NOTE — NEPHROLOGY PROGRESS NOTE
Assessment/Plan


Problem List:  


(1) HTN (hypertension)


(2) Chest pain


(3) ACS (acute coronary syndrome)


Plan


Pain management


Monitor lytes, correct as needed


Cardiology consult


Pulmo following


AM labs





Subjective


Constitutional:  Denies: chills, diaphoresis, fever, malaise, no symptoms, other

, weakness


HEENT:  Denies: blurred vision, double vision, ear discharge, ear pain, eye pain

, mouth pain, mouth swelling, no symptoms, nose congestion, nose pain, other, 

tearing, throat pain, throat swelling


Genitourinary:  Denies: burning, discharge, flank pain, frequency, hematuria, 

incontinence, no symptoms, other, pain, urgency


Neurologic/Psychiatric:  Denies: anxiety, depressed, emotional problems, 

headache, no symptoms, numbness, other, paresthesia, pre-existing deficit, 

seizure, tingling, tremors, weakness


Subjective


late entry for 5/25/17





Objective


Objective





Last 24 Hour Vital Signs








  Date Time  Temp Pulse Resp B/P Pulse Ox O2 Delivery O2 Flow Rate FiO2


 


5/26/17 16:00  70      


 


5/26/17 15:15 97.9 67 18 135/92 99 Room Air  


 


5/26/17 12:00  72      


 


5/26/17 11:37 97.3 74 18 124/89 97 Room Air  


 


5/26/17 08:31    120/69    


 


5/26/17 08:31  70  120/69    


 


5/26/17 08:11 97.0 70 18 120/69 96 Room Air  


 


5/26/17 08:00  74      


 


5/26/17 04:10 97.7 66 20 116/78 98 Room Air  


 


5/26/17 04:00  72      


 


5/26/17 00:00 97.0 67 20 130/76 100 Room Air  


 


5/26/17 00:00  72      


 


5/25/17 20:00  77      


 


5/25/17 20:00 98.1 76 20 122/77 96 Room Air  

















Intake and Output  


 


 5/25/17 5/26/17





 19:00 07:00


 


Output Total  1600 ml


 


Balance  -1600 ml


 


  


 


Output Urine Total  1600 ml


 


# Voids  2








Height (Feet):  5


Height (Inches):  6.00


Weight (Pounds):  173


General Appearance:  no apparent distress, alert


EENT:  normal ENT inspection


Neck:  non-tender, normal alignment


Cardiovascular:  normal rate, regular rhythm, no JVD


Respiratory/Chest:  lungs clear, normal breath sounds, no respiratory distress


Abdomen:  non tender, soft, no organomegaly


Extremities:  non-tender


Neurologic:  alert, oriented x 3, responsive, normal mood/affect











ELLIE ALLRED May 26, 2017 19:36

## 2017-05-26 NOTE — CARDIAC ELECTROPHYSIOLOGY PN
Subjective


Subjective


[id number]





Objective





Last 24 Hour Vital Signs








  Date Time  Temp Pulse Resp B/P Pulse Ox O2 Delivery O2 Flow Rate FiO2


 


5/26/17 15:15 97.9 67 18 135/92 99 Room Air  


 


5/26/17 12:00  72      


 


5/26/17 11:37 97.3 74 18 124/89 97 Room Air  


 


5/26/17 08:31    120/69    


 


5/26/17 08:31  70  120/69    


 


5/26/17 08:11 97.0 70 18 120/69 96 Room Air  


 


5/26/17 08:00  74      


 


5/26/17 04:10 97.7 66 20 116/78 98 Room Air  


 


5/26/17 04:00  72      


 


5/26/17 00:00 97.0 67 20 130/76 100 Room Air  


 


5/26/17 00:00  72      


 


5/25/17 20:00  77      


 


5/25/17 20:00 98.1 76 20 122/77 96 Room Air  

















Intake and Output  


 


 5/25/17 5/26/17





 19:00 07:00


 


Output Total  1600 ml


 


Balance  -1600 ml


 


  


 


Output Urine Total  1600 ml


 


# Voids  2











Microbiology








 Date/Time


Source Procedure


Growth Status


 


 


 5/24/17 17:59


Nasal Nares MRSA Culture - Final


NO METHICILLIN RESISTANT STAPH AUREUS... Complete

















RAMÓN LOPEZ May 26, 2017 16:37

## 2017-05-27 NOTE — PULMONOLOGY PROGRESS NOTE
Assessment/Plan


Assessment/Plan


IMPRESSION:


1. Atypical chest pain.


2. History of right coronary artery occlusive disease.





DISCUSSION:  I suspect the pain is either the musculoskeletal or


cardiac origin.  I do not suspect a pulmonary reason for his chest pain.


I will follow as needed.





Subjective


Interval Events:  None; seen by cardiology


Constitutional:  Reports: no symptoms


HEENT:  Repors: no symptoms


Respiratory:  Reports: pleuritic pain


Cardiovascular:  Reports: no symptoms


Gastrointestinal/Abdominal:  Reports: no symptoms


Genitourinary:  Reports: no symptoms


Neurologic:  Reports: no symptoms


Allergies:  


Coded Allergies:  


     KETOROLAC (Unverified  Adverse Reaction, Intermediate, n/v, 1/12/15)


Uncoded Allergies:  


     TORODOL (Allergy, Unknown, 10/21/16)





Objective





Last 24 Hour Vital Signs








  Date Time  Temp Pulse Resp B/P Pulse Ox O2 Delivery O2 Flow Rate FiO2


 


5/27/17 09:19    124/84    


 


5/27/17 09:19  63  124/84    


 


5/27/17 08:00 97.9 63 18 124/84 94 Room Air  


 


5/27/17 04:26  80      


 


5/27/17 04:16 98.7 86 21 128/70 95 Room Air  


 


5/27/17 02:14  70      


 


5/27/17 00:31 98.6 75 20 143/93 94 Room Air  


 


5/26/17 22:29  76      


 


5/26/17 20:30 98.5 67 20 125/75 98 Room Air  


 


5/26/17 16:00  70      


 


5/26/17 15:15 97.9 67 18 135/92 99 Room Air  


 


5/26/17 12:00  72      


 


5/26/17 11:37 97.3 74 18 124/89 97 Room Air  

















Intake and Output  


 


 5/26/17 5/27/17





 19:00 07:00


 


Intake Total 360 ml 


 


Output Total 350 ml 700 ml


 


Balance 10 ml -700 ml


 


  


 


Intake Oral 360 ml 


 


Output Urine Total 350 ml 700 ml


 


# Voids 2 








General Appearance:  no acute distress


HEENT:  normocephalic


Respiratory/Chest:  chest wall non-tender


Cardiovascular:  normal peripheral pulses, normal rate


Abdomen:  normal bowel sounds





Microbiology








 Date/Time


Source Procedure


Growth Status


 


 


 5/24/17 17:59


Nasal Nares MRSA Culture - Final


NO METHICILLIN RESISTANT STAPH AUREUS... Complete


 


 5/24/17 17:59


Rectum VRE Culture - Final


NO VANCOMYCIN RESISTANT ENTEROCOCCUS ... Complete








Laboratory Tests


5/27/17 07:27: 


White Blood Count 8.6, Red Blood Count 5.18, Hemoglobin 15.4, Hematocrit 48.6, 

Mean Corpuscular Volume 94, Mean Corpuscular Hemoglobin 29.8, Mean Corpuscular 

Hemoglobin Concent 31.7L, Red Cell Distribution Width 13.2, Platelet Count 211, 

Mean Platelet Volume 9.4, Neutrophils (%) (Auto) 65.0, Lymphocytes (%) (Auto) 

18.9L, Monocytes (%) (Auto) 8.2, Eosinophils (%) (Auto) 6.0H, Basophils (%) (

Auto) 1.9, Sodium Level 137, Potassium Level 3.9, Chloride Level 95L, Carbon 

Dioxide Level 23, Anion Gap 19H, Blood Urea Nitrogen 18, Creatinine 1.6H, 

Estimat Glomerular Filtration Rate 56.0, Glucose Level 112H, Calcium Level 9.8, 

Phosphorus Level 5.0H, Magnesium Level 1.9





Current Medications








 Medications


  (Trade)  Dose


 Ordered  Sig/Rodney


 Route


 PRN Reason  Start Time


 Stop Time Status Last Admin


Dose Admin


 


 Acetaminophen/


 Hydrocodone Bitart


  (Norco 10/325)  1 ea  Q4H  PRN


 ORAL


 Moderate Pain (Pain Scale 4-6)  5/24/17 22:15


 5/31/17 22:14  5/26/17 20:48


 


 


 Allopurinol


  (Zyloprim)  100 mg  DAILY


 ORAL


   5/25/17 09:00


 6/24/17 08:59  5/27/17 09:18


 


 


 Amlodipine


 Besylate


  (Norvasc)  2.5 mg  DAILY


 ORAL


   5/25/17 09:00


 6/24/17 08:59  5/27/17 09:19


 


 


 Atorvastatin


 Calcium


  (Lipitor)  20 mg  BEDTIME


 ORAL


   5/26/17 21:00


 6/25/17 20:59  5/26/17 20:48


 


 


 Clonidine HCl


  (Catapres)  0.1 mg  Q6HR  PRN


 ORAL


 For High Blood Pressure  5/24/17 22:15


 6/23/17 22:14   


 


 


 Gabapentin


  (Neurontin)  800 mg  THREE TIMES A  DAY


 ORAL


   5/25/17 09:00


 6/24/17 08:59  5/27/17 09:18


 


 


 Heparin Sodium


  (Porcine)


  (Heparin 5000


 units/ml)  5,000 units  EVERY 8  HOURS


 SUBQ


   5/25/17 06:00


 6/24/17 05:59  5/27/17 06:34


 


 


 Hydrochlorothiazide


  (Hydrodiuril)  25 mg  DAILY


 ORAL


   5/25/17 09:00


 6/24/17 08:59  5/27/17 09:19


 


 


 Hydromorphone HCl


  (Dilaudid)  1 mg  Q4H  PRN


 IVP


 SEVERE PAIN  5/26/17 22:00


 6/2/17 21:59  5/27/17 06:33


 


 


 Lisinopril


  (Prinivil)  20 mg  DAILY


 ORAL


   5/25/17 09:00


 6/24/17 08:59  5/27/17 09:19


 


 


 Nitroglycerin


  (Ntg)  0.4 mg  Q5M  PRN


 SL


 Prn Chest Pain  5/24/17 18:00


 6/23/17 17:59  5/24/17 18:13


 


 


 Ondansetron HCl


  (Zofran)  4 mg  Q6H  PRN


 IVP


 Nausea & Vomiting  5/24/17 22:15


 6/23/17 22:14   


 


 


 Pantoprazole


  (Protonix)  40 mg  DAILY


 ORAL


   5/25/17 09:00


 6/24/17 08:59  5/27/17 09:18


 


 


 Zolpidem Tartrate


  (Ambien)  5 mg  HSPRN  PRN


 ORAL


 Insomnia  5/25/17 21:45


 6/24/17 21:44  5/26/17 20:47


 

















Blair Mckinley MD May 27, 2017 09:59

## 2017-05-27 NOTE — CONSULTATION
DATE OF CONSULTATION:  05/26/2017



CARDIOLOGY CONSULTATION



REFERRING PHYSICIAN:  Luciano Nguyen M.D.



REASON FOR CONSULTATION:  Chest pain.



HISTORY OF PRESENT ILLNESS:  The patient is a 51-year-old gentleman

who has a history of hypertension and chronic kidney disease, who was just

recently admitted to [facility] with chest pain.  The

patient had a cardiac catheterization on 05/18/2017 by  _____ that

showed no critical coronary artery disease.  The patient had only 30%

distal left main as well as very small right coronary artery.  The patient

was discharged home.  He presented to the [facility]

complaining of chest pain again and the patient was admitted and a

Cardiology consultation was obtained for evaluation.



REVIEW OF SYSTEM:  Performed and was negative other what was

mentioned in history of present illness.



PAST MEDICAL HISTORY:

1. Hypertension.

2. History of chronic kidney disease.

3. History of cerebellar stroke.

4. Old right middle cerebral artery stroke.



FAMILY HISTORY:  Noncontributory.



SOCIAL HISTORY:  He is .



MEDICATIONS:  Per reconciliation note.



PHYSICAL EXAMINATION:

VITAL SIGNS:  Show blood pressure of 132/95, pulse 60, respirations

18, and temperature 97.9 degrees.

HEAD AND NECK:  Showed no JVD.

LUNGS:  Clear.

CARDIOVASCULAR:  Shows regular S1 and S2 with no gallop or murmur.

ABDOMEN:  Soft.

EXTREMITIES:  No pitting edema.



LABORATORY DATA:  Showed white count of 7.5, hemoglobin 14.2,

hematocrit 46.1, and platelets of 194,000.  Sodium 140, potassium 3.8, BUN

12, creatinine 1.4, and glucose of 86.  Troponin is negative x2.



ASSESSMENT AND PLAN:

1. Atypical chest pain.  The patient was ruled out for myocardial

infarction again.  The patient's cardiac catheterization a week ago, no

significant coronary artery disease.  The patient underwent echocardiogram

at [facility].  At this time, the patient also showed no

evidence of pericardial effusion ejection fraction of 55%.  His EKG is

also completely normal.

2. History of chronic kidney disease.  Creatinine has come down to

1.4.

3. Hyperlipidemia on Lipitor.

4. Hypertension on Norvasc 20 mg daily and lisinopril 20 mg daily.  He

is also on hydrochlorothiazide 25 mg daily and management of Dr. Nguyen

on p.r.n. clonidine.

5. The patient had history of prior stroke on Neurontin.



Thank you very much, Dr. Nguyen, for allowing me to participate in

the care of this patient.  Please do not hesitate to contact me with any

questions regarding my evaluation.









  ______________________________________________

  Anirudh Crouch M.D.





DR:  KINZA

D:  05/26/2017 16:37

T:  05/27/2017 02:19

JOB#:  [id number]

CC:

## 2017-05-30 NOTE — DISCHARGE SUMMARY
Discharge Summary


Hospital Course


Date of Admission


May 24, 2017 at 18:06


Date of Discharge


May 27, 2017 at 11:05


Admitting Diagnosis


Acute Coronary Syndrome/Chest Pain


HPI


Stef Feliciano is a 51 year old male who was admitted on May 24, 2017 at 18:06 

for Acute Coronary Syndrome,Chest Pain


Hospital Course


dc summary#[id number]





Discharge Medications


New Medications:  


Aspirin Ec* (Aspirin Ec*) 81 Mg Tablet.


81 MG ORAL DAILY, #30 TAB





Atorvastatin Calcium* (Lipitor*) 20 Mg Tablet


20 MG ORAL BEDTIME, #30 TAB





 


Continued Medications:  


Allopurinol* (Allopurinol*) 100 Mg Tablet


100 MG ORAL DAILY, TAB





Amlodipine Besylate* (Amlodipine Besylate*) 2.5 Mg Tablet


2.5 MG ORAL DAILY, TAB





Clonidine Hcl* (Catapres*) 0.1 Mg Tablet


0.1 MG ORAL DAILY PRN for For High Blood Pressure, TAB





Gabapentin* (Gabapentin*) 100 Mg Capsule


800 MG ORAL THREE TIMES A DAY, CAP





Lisinopril/Hydrochlorothiazide 20-12.5 Mg Tab (Lisinopril-Hctz 20-12.5 Mg Tab) 

1 Each Tablet


1 TAB ORAL DAILY for 30 Days





Omeprazole (Omeprazole) 20 Mg Capsule.


20 MG PO DAILY for 30 Days











Discharge


Condition Upon Discharge:  stable


Discharge Disposition


Patient was discharged to Home (01)


Discharge Diagnoses:  





Discharge Instructions


Discharge Instructions


Special Instructions


I have been assigned to complete a D/C Summary on this account. I was not 

involved in the patient management











Leticia Romero NP ([place]) May 30, 2017 10:00

## 2018-02-10 NOTE — EMERGENCY ROOM REPORT
History of Present Illness


General


Chief Complaint:  Chest Pain


Source:  Patient, Medical Record





Present Illness


HPI


52YOM walk-in with chest pain since 9am when he woke up.  8 hours prior.  Pain 

is constant, right and left upper chest. Worse with movement. Denies assoc fever

/chills, sob, cough, abd pain, nausea/vomiting.


Patient stated he fell down 4x stairs with the chest pain and caught himself 

with left hand/wrist, c/o pain to left wrist.  Denies pain to forearm, shoulder

, upper arm. Didnt hit head.





Not on Plavix or ASA currently "because my doctor told me not to take because 

of previous 'brain bleed.' Similar story to last may in ED when patient was 

here.  


Denies ETOH, smoking, drugs.  





Endorses 2X previous CVA with left sided weakness.





Per EMR - multiple visits here for same complaint


Jan 2017 EF was 55-60%


May 2017 EF was also 55%


Nuclear stress test negative for ischemia


Since 2015, multiple troponins done  Only one slightly positive 0.70 troponin 8/ 31 but was in setting of JALEEL on known CKD


May 2017 admission: also ruled out for ACS, thought to have atypical chest pain








Medical records faxed from [facility]: 4x ECG show isolated TWI in Leads 3 and AVF.


CT chest was negative.


Cardiac cath: CAD


- left coronary: sclerosis in distal left main with maximal 30% distal left 

main stenosis, sclerosis in LAD


- right coronary: diffuse sclerosis in mid portion; high degree stenosis in 

distal portion





Recommendation was for medical conservatory therapy


No stents were placed at that time




















Hypertension on Norvasc 20 mg daily and lisinopril 20 mg daily.  He


is also on hydrochlorothiazide 25 mg daily and management of Dr. Nguyen


on p.r.n. clonidine.


Allergies:  


Coded Allergies:  


     KETOROLAC (Unverified  Adverse Reaction, Intermediate, n/v, 1/12/15)


Uncoded Allergies:  


     TORODOL (Allergy, Unknown, 10/21/16)





Patient History


Past Medical History:  CAD, other - See HPI


Past Surgical History:  none


Pertinent Family History:  none


Social History:  Denies: smoking, alcohol use, drug use


Immunizations:  UTD


Reviewed Nursing Documentation:  PMH: Agreed, PSxH: Agreed





Nursing Documentation-PMH


Hx Cardiac Problems:  Yes


Hx Hypertension:  Yes


Hx Cancer:  No


Hx Gastrointestinal Problems:  Yes


Hx Neurological Problems:  Yes - Spinal Fusion and stroke 2011(Lt. side deficit)

, Neuralgia & neuritis


Hx Cerebrovascular Accident:  Yes - CVA w/ Lt side weaknedss


Hx Neurologic Surgery:  Yes - Craniotomy at [facility]/ Intracranial hemorrage





Review of Systems


All Other Systems:  negative except mentioned in HPI





Physical Exam





Vital Signs








  Date Time  Temp Pulse Resp B/P (MAP) Pulse Ox O2 Delivery O2 Flow Rate FiO2


 


2/10/18 17:02 97.9 74 18 204/115 100 Room Air  








Sp02 EP Interpretation:  reviewed, normal


General Appearance:  normal inspection, well appearing, no apparent distress, 

alert, GCS 15, non-toxic


Head:  normocephalic, atraumatic


Eyes:  bilateral eye PERRL, bilateral eye EOMI


ENT:  normal ENT inspection, hearing grossly normal, normal pharynx, no 

angioedema, normal voice, TMs + canals normal, uvula midline, moist mucus 

membranes


Neck:  normal inspection, full range of motion, supple, thyroid normal, no 

meningismus, no bony tend


Respiratory:  normal inspection, lungs clear, normal breath sounds, no rhonchi, 

no respiratory distress, no retraction, no accessory muscle use, no wheezing, 

speaking full sentences


Cardiovascular #1:  regular rate, rhythm, no edema, no JVD, normal capillary 

refill


Gastrointestinal:  normal inspection, normal bowel sounds, non tender, soft, no 

mass, no peritonitis, non-distended, no guarding, no hernia, no pulsatile mass


Genitourinary:  no CVA tenderness


Musculoskeletal:  normal inspection, back normal, normal range of motion, no 

calf tenderness, pelvis stable, Nelson's Sign negative, other - Left wrist: 

minimal ttp. No obvious deformity or reduced ROM


Neurologic:  normal inspection, alert, oriented x3, responsive, CNs III-XII nml 

as tested, motor strength/tone normal, cerebellar normal, normal gait, speech 

normal


Psychiatric:  normal inspection, judgement/insight normal, mood/affect normal, 

no suicidal/homicidal ideation, no delusions


Skin:  normal inspection, normal color, no rash


Lymphatic:  normal inspection, no adenopathy





Procedures


Splinting


Splinting :  


   Consent:  Verbal


   Pre-Made Type:  velcro


   Splint:  wrist


   Pre-Proc Neuro Vasc Exam:  normal


   Post-Proc Neuro Vasc Exam:  normal


   Patient Tolerated:  Well


   Complications:  None





Medical Decision Making


Diagnostic Impression:  


 Primary Impression:  


 Chest pain


 Qualified Codes:  R07.9 - Chest pain, unspecified


 Additional Impressions:  


 Left wrist pain


 Fall


 Qualified Codes:  W19.XXXA - Unspecified fall, initial encounter


 CKD (chronic kidney disease)


 Qualified Codes:  N18.9 - Chronic kidney disease, unspecified


 HTN (hypertension)


 Qualified Codes:  I10 - Essential (primary) hypertension


ER Course


204/115 to 162/100 after SL nitro


Unlikely HTN emergency given trop WNL and serumCr at baseline





Chest pain: Relieved with multiple SL nitro.  Trop WNL.  Unlikely ACS given 9 

hours+ of symptom onset


Left wrist: no fx or dislocation on Xray on ED review. Still with pain so 

splint placed for possible occult fx


Analgesia provided for fall/wrist pain





DC








ER course:


Patient has remained stable during ED stay.





Disposition:





Patient is to be discharged to home.


Prescriptions given are tylenol with codeine


Patient is instructed to follow up with their primary care doctor or 

cardiologist within 5 days. 


Strict return precautions discussed with patient such as fever, chills, 

worsening/severe pain, nausea, vomiting, which may indicate severe illness. 

Patient verbalizes understanding and agrees with plan. 





Please note that this Emergency Department Report was dictated using [employer] technology software, occasionally this can lead to 

erroneous entry secondary to interpretation by the dictation equipment


EKG Diagnostic Results


Rate:  normal


Rhythm:  NSR


ST Segments:  no acute changes


Other Impression


seen again are previously noted isolated TWI in 3 and AVF


ASA given to the pt in ED:  No





Rhythm Strip Diag. Results


EP Interpretation:  yes


Rate:  66


Rhythm:  NSR, no PVC's, no ectopy





Other X-Ray Diagnostic Results


Other X-Ray Diagnostic Results :  


   X-Ray ordered:  left wrist


   # of Views/Limited Vs Complete:  3 View


   Indication:  Pain


   EP Interpretation:  Yes


   Interpretation:  no dislocation, no soft tissue swelling, no fractures


   Impression:  No acute disease


   Electronically Signed by:  Dr Hayden Davey MD





Last Vital Signs








  Date Time  Temp Pulse Resp B/P (MAP) Pulse Ox O2 Delivery O2 Flow Rate FiO2


 


2/10/18 17:02 97.9 74 18 204/115 100 Room Air  








Status:  improved


Disposition:  HOME, SELF-CARE











HAYDEN DAVEY M.D. Feb 10, 2018 17:10

## 2018-02-11 NOTE — DIAGNOSTIC IMAGING REPORT
Indication: Pain

 

Technique: XRAY Wrist Complete L

 

Comparison: 12/19/2015

 

Findings: There is no acute fracture or dislocation. Anatomic alignment and joint

spaces preserved. No radiopaque foreign body seen.

 

Impression: No evidence of acute bony or articular abnormality.

## 2018-02-25 NOTE — CARDIOLOGY REPORT
--------------- APPROVED REPORT --------------





EKG Measurement

Heart [id number]

HI 168P77

[id number]

[id number]

PBr107





Normal sinus rhythm

Normal ECG

## 2019-04-04 ENCOUNTER — HOSPITAL ENCOUNTER (EMERGENCY)
Dept: HOSPITAL 72 - EMR | Age: 54
LOS: 1 days | Discharge: HOME | End: 2019-04-05
Payer: MEDICARE

## 2019-04-04 VITALS — SYSTOLIC BLOOD PRESSURE: 160 MMHG | DIASTOLIC BLOOD PRESSURE: 104 MMHG

## 2019-04-04 VITALS — HEIGHT: 66 IN | BODY MASS INDEX: 29.89 KG/M2 | WEIGHT: 186 LBS

## 2019-04-04 DIAGNOSIS — Z88.8: ICD-10-CM

## 2019-04-04 DIAGNOSIS — Z88.5: ICD-10-CM

## 2019-04-04 DIAGNOSIS — R07.9: Primary | ICD-10-CM

## 2019-04-04 DIAGNOSIS — Z98.1: ICD-10-CM

## 2019-04-04 DIAGNOSIS — I69.354: ICD-10-CM

## 2019-04-04 DIAGNOSIS — M79.662: ICD-10-CM

## 2019-04-04 LAB
ADD MANUAL DIFF: NO
ALBUMIN SERPL-MCNC: 3.8 G/DL (ref 3.4–5)
ALBUMIN/GLOB SERPL: 1.2 {RATIO} (ref 1–2.7)
ALP SERPL-CCNC: 48 U/L (ref 46–116)
ALT SERPL-CCNC: 22 U/L (ref 12–78)
ANION GAP SERPL CALC-SCNC: 9 MMOL/L (ref 5–15)
AST SERPL-CCNC: 15 U/L (ref 15–37)
BASOPHILS NFR BLD AUTO: 1.8 % (ref 0–2)
BILIRUB SERPL-MCNC: 0.2 MG/DL (ref 0.2–1)
BUN SERPL-MCNC: 12 MG/DL (ref 7–18)
CALCIUM SERPL-MCNC: 8.5 MG/DL (ref 8.5–10.1)
CHLORIDE SERPL-SCNC: 107 MMOL/L (ref 98–107)
CK MB SERPL-MCNC: 2.2 NG/ML (ref 0–3.6)
CK SERPL-CCNC: 402 U/L (ref 26–308)
CO2 SERPL-SCNC: 27 MMOL/L (ref 21–32)
CREAT SERPL-MCNC: 1.9 MG/DL (ref 0.55–1.3)
EOSINOPHIL NFR BLD AUTO: 7 % (ref 0–3)
ERYTHROCYTE [DISTWIDTH] IN BLOOD BY AUTOMATED COUNT: 13.8 % (ref 11.6–14.8)
GLOBULIN SER-MCNC: 3.2 G/DL
HCT VFR BLD CALC: 39.9 % (ref 42–52)
HGB BLD-MCNC: 13.1 G/DL (ref 14.2–18)
LYMPHOCYTES NFR BLD AUTO: 24.8 % (ref 20–45)
MCV RBC AUTO: 91 FL (ref 80–99)
MONOCYTES NFR BLD AUTO: 6.5 % (ref 1–10)
NEUTROPHILS NFR BLD AUTO: 59.9 % (ref 45–75)
PLATELET # BLD: 208 K/UL (ref 150–450)
POTASSIUM SERPL-SCNC: 3.8 MMOL/L (ref 3.5–5.1)
RBC # BLD AUTO: 4.38 M/UL (ref 4.7–6.1)
SODIUM SERPL-SCNC: 143 MMOL/L (ref 136–145)
WBC # BLD AUTO: 9.2 K/UL (ref 4.8–10.8)

## 2019-04-04 PROCEDURE — 70450 CT HEAD/BRAIN W/O DYE: CPT

## 2019-04-04 PROCEDURE — 85025 COMPLETE CBC W/AUTO DIFF WBC: CPT

## 2019-04-04 PROCEDURE — 93005 ELECTROCARDIOGRAM TRACING: CPT

## 2019-04-04 PROCEDURE — 71045 X-RAY EXAM CHEST 1 VIEW: CPT

## 2019-04-04 PROCEDURE — 96372 THER/PROPH/DIAG INJ SC/IM: CPT

## 2019-04-04 PROCEDURE — 82550 ASSAY OF CK (CPK): CPT

## 2019-04-04 PROCEDURE — 84484 ASSAY OF TROPONIN QUANT: CPT

## 2019-04-04 PROCEDURE — 80053 COMPREHEN METABOLIC PANEL: CPT

## 2019-04-04 PROCEDURE — 36415 COLL VENOUS BLD VENIPUNCTURE: CPT

## 2019-04-04 PROCEDURE — 99284 EMERGENCY DEPT VISIT MOD MDM: CPT

## 2019-04-04 PROCEDURE — 82553 CREATINE MB FRACTION: CPT

## 2019-04-04 NOTE — NUR
ED Nurse Note:



RECIEVED REPORT TO RESUME CARE, PT IN BED AWAKE, ALERT AND ORIENTED X 4, PT IS 
ON CARDIAC MONITORING AND HERE WITH C/O CHEST AND BACK PAIN AT 9/10, PT IS 
CALMLY ON COMPUTER TABLET, IV LINE PLACED AND LABS DRAWN, WILL RESUME CARE AS 
ORDERED, PT REFUSED TYLENOL FOR PAIN, STATES NEEDS SOMETHING STRONGER, MD 
AWARE, NO NEW ORDERS GIVEN, WILL CONTINUE TO CLOSELY MONITOR.

## 2019-04-04 NOTE — NUR
ED Nurse Note:





pt walked in c/o epigastric to substernal chest pain radiating to left arm and 
back since 3pm today.pt states he has Hx Stroke in 2011 & 2017 and has left 
side weakness. pt denies other sx, denies sob. pt AA&ox4, gcs=15, skin warm and 
dry, resp even and unlabored on RA, -n/v/d, ambulates w/ steady gait, VSS, NSR 
on cardiac monitor, will cont monitor.

## 2019-04-04 NOTE — EMERGENCY ROOM REPORT
History of Present Illness


General


Chief Complaint:  Chest Pain


Source:  Patient





Present Illness


HPI


Patient present with complaints of pain to the left upper chest left shoulder


Feels that there was radiation to his left arm


Patient also complained of pain and sharp pain to the left lower leg


All starting at the same time





Denies any shortness of breath or pleurisy denies any vomiting or diarrhea





Denies any change with vision pain and discomfort started approximately 3:00 in 

the afternoon


Allergies:  


Coded Allergies:  


     KETOROLAC (Unverified  Adverse Reaction, Intermediate, n/v, 1/12/15)


Uncoded Allergies:  


     TORODOL (Allergy, Unknown, 10/21/16)





Patient History


Past Medical History:  see triage record


Pertinent Family History:  none


Reviewed Nursing Documentation:  PMH: Agreed; PSxH: Agreed





Nursing Documentation-PMH


Past Medical History:  No History, Except For


Hx Cardiac Problems:  Yes


Hx Hypertension:  Yes


Hx Cancer:  No


Hx Gastrointestinal Problems:  Yes


Hx Neurological Problems:  Yes - Spinal Fusion and stroke 2011(Lt. side deficit)

, Neuralgia & neuritis


Hx Cerebrovascular Accident:  Yes - CVA w/ Lt side weaknedss 2011,2017


Hx Neurologic Surgery:  Yes - Craniotomy at [facility]/ Intracranial hemorrage





Review of Systems


All Other Systems:  negative except mentioned in HPI





Physical Exam





Vital Signs








  Date Time  Temp Pulse Resp B/P (MAP) Pulse Ox O2 Delivery O2 Flow Rate FiO2


 


4/4/19 22:23 97.5 95 16 159/106 95 Room Air  








Sp02 EP Interpretation:  reviewed, normal


General Appearance:  well appearing, no apparent distress


Head:  normocephalic, atraumatic


Eyes:  bilateral eye PERRL, bilateral eye EOMI


ENT:  hearing grossly normal, normal pharynx, TMs + canals normal, uvula midline


Neck:  full range of motion, supple, no meningismus, no bony tend


Respiratory:  lungs clear, normal breath sounds, no rhonchi, no respiratory 

distress, no retraction, no accessory muscle use


Cardiovascular #1:  normal peripheral pulses, regular rate, rhythm, no edema, 

no gallop, no JVD, no murmur


Gastrointestinal:  normal bowel sounds, non tender, soft, no mass, no 

organomegaly, non-distended, no guarding, no hernia, no pulsatile mass, no 

rebound


Genitourinary:  no CVA tenderness


Musculoskeletal:  normal inspection


Neurologic:  oriented x3, responsive, CNs III-XII nml as tested, motor strength/

tone normal, sensory intact


Psychiatric:  mood/affect normal


Skin:  normal color, no rash, warm/dry, palpation normal


Lymphatic:  normal inspection, no adenopathy





Medical Decision Making


Diagnostic Impression:  


 Primary Impression:  


 Chest pain


ER Course


Patient is a fairly complex patient with multiple differential to consideration 

including but not limited to cardiac cardiopulmonary and vascular emergencies


Patient's blood work is at baseline levels EKG does not show any acute changes


CT head did not show any acute change


Patient remains essentially a symptomatically throughout his stay at the time 

of disposition [facility] also contacted us and the patient is still 

under their care it was reported that the patient felt he did not have 

insurance coverage





Therefore he was recommended to follow closely





Labs








Test


  4/4/19


23:10


 


White Blood Count


  9.2 K/UL


(4.8-10.8)


 


Red Blood Count


  4.38 M/UL


(4.70-6.10)


 


Hemoglobin


  13.1 G/DL


(14.2-18.0)


 


Hematocrit


  39.9 %


(42.0-52.0)


 


Mean Corpuscular Volume 91 FL (80-99) 


 


Mean Corpuscular Hemoglobin


  29.8 PG


(27.0-31.0)


 


Mean Corpuscular Hemoglobin


Concent 32.7 G/DL


(32.0-36.0)


 


Red Cell Distribution Width


  13.8 %


(11.6-14.8)


 


Platelet Count


  208 K/UL


(150-450)


 


Mean Platelet Volume


  8.1 FL


(6.5-10.1)


 


Neutrophils (%) (Auto)


  59.9 %


(45.0-75.0)


 


Lymphocytes (%) (Auto)


  24.8 %


(20.0-45.0)


 


Monocytes (%) (Auto)


  6.5 %


(1.0-10.0)


 


Eosinophils (%) (Auto)


  7.0 %


(0.0-3.0)


 


Basophils (%) (Auto)


  1.8 %


(0.0-2.0)


 


Sodium Level


  143 MMOL/L


(136-145)


 


Potassium Level


  3.8 MMOL/L


(3.5-5.1)


 


Chloride Level


  107 MMOL/L


()


 


Carbon Dioxide Level


  27 MMOL/L


(21-32)


 


Anion Gap


  9 mmol/L


(5-15)


 


Blood Urea Nitrogen


  12 mg/dL


(7-18)


 


Creatinine


  1.9 MG/DL


(0.55-1.30)


 


Estimat Glomerular Filtration


Rate 45.6 mL/min


(>60)


 


Glucose Level


  126 MG/DL


()


 


Calcium Level


  8.5 MG/DL


(8.5-10.1)


 


Total Bilirubin


  0.2 MG/DL


(0.2-1.0)


 


Aspartate Amino Transf


(AST/SGOT) 15 U/L (15-37) 


 


 


Alanine Aminotransferase


(ALT/SGPT) 22 U/L (12-78) 


 


 


Alkaline Phosphatase


  48 U/L


()


 


Total Creatine Kinase


  402 U/L


()


 


Creatine Kinase MB


  2.2 NG/ML


(0.0-3.6)


 


Creatine Kinase MB Relative


Index 0.5 


 


 


Troponin I


  0.010 ng/mL


(0.000-0.056)


 


Total Protein


  7.0 G/DL


(6.4-8.2)


 


Albumin


  3.8 G/DL


(3.4-5.0)


 


Globulin 3.2 g/dL 


 


Albumin/Globulin Ratio 1.2 (1.0-2.7) 








EKG Diagnostic Results


Rate:  normal


Rhythm:  NSR


ST Segments:  other - Nonspecific ST T-wave changes





Rhythm Strip Diag. Results


EP Interpretation:  yes


Rate:  66


Rhythm:  NSR, no PVC's, no ectopy





Chest X-Ray Diagnostic Results


Chest X-Ray Diagnostic Results :  


   Chest X-Ray Ordered:  Yes


   # of Views/Limited/Complete:  1 View


   Indication:  Chest Pain


   EP Interpretation:  Yes


   Interpretation:  no consolidation, no effusion, no pneumothorax


   Impression:  No acute disease


   Electronically Signed by:  Viral Yates DO





CT/MRI/US Diagnostic Results


CT/MRI/US Diagnostic Results :  


   Impression


CT head no acute disease





Last Vital Signs








  Date Time  Temp Pulse Resp B/P (MAP) Pulse Ox O2 Delivery O2 Flow Rate FiO2


 


4/4/19 22:39 97.5 95 16 160/104 98 Room Air  








Status:  improved


Disposition:  HOME, SELF-CARE


Condition:  Improved





Additional Instructions:  


Patient is provided with the discharge instructions notified to follow up with 

primary doctor in the next 2-3 days otherwise return to the er with any 

worsening symptoms.


Please note that this report is being documented using [employer] technology.  This 

can lead to erroneous entry secondary to incorrect interpretation by the 

dictating instrument.











Viral Yates DO Apr 4, 2019 23:20

## 2019-04-04 NOTE — NUR
-------------------------------------------------------------------------------

            *** Note mario alberto in EDM - 04/05/19 at 1951 by CECILLE ***             

-------------------------------------------------------------------------------

ED Nurse Note:



receiving SIMIN Silva refused to get report, endorsed care, pt vss, resp even and 
unlabored on RA, Nsr on cardiac monitor.

## 2019-04-04 NOTE — NUR
HAND OFF:





Per RN Shira, RN received report from triage nurse, endorsed care to receiving 
RN, pt vss, NSR on cardiac monitor, resp even and unlabored on RA, no sx 
distress at this time.

## 2019-04-05 VITALS — SYSTOLIC BLOOD PRESSURE: 141 MMHG | DIASTOLIC BLOOD PRESSURE: 91 MMHG

## 2019-04-05 VITALS — DIASTOLIC BLOOD PRESSURE: 104 MMHG | SYSTOLIC BLOOD PRESSURE: 160 MMHG

## 2019-04-05 NOTE — NUR
ED Nurse Note:



Pt being d/c to home, awake, alert and oriented x 4, pt is ambulatory with mild 
right side weakness from old cva, pt was medicated for pain with morphine, meds 
were effective bringing pain lev el down to 3/10, pt brother at bedside to 
drive and assist him, f/u info and after care instructions given to prt, 
armband and iv line removed without complications, nad noted.

## 2019-04-05 NOTE — DIAGNOSTIC IMAGING REPORT
Indications: Headache

 

Technique: Spiral acquisitions obtained through the brain. Angled axial and coronal 5

x 5 mm slices were reconstructed. Total dose length product 1421.83 mGycm.  CTDI

vol(s) 70.38 mGy. Dose reduction achieved using automated exposure control

 

Comparison: 10/21/2016 

 

Findings: Again demonstrated is mild age-related enlargement of the ventricles and

extra axial CSF spaces. Again demonstrated is an old lacunar infarct in the right

basal ganglia and another in the right corona radiata, with resultant ex vacuo

dilatation of the body of the right lateral ventricle. Again demonstrated is

periventricular deep white matter low-attenuation which is asymmetric, greater on the

right than on the left. No acute intracranial hemorrhage or edema, mass effect, nor

midline shift. The remainder of the gray-white differentiation is normal. The

calvarium is intact. The mastoids are clear. There is a right maxillary sinus polyp

versus mucous retention cyst.

 

Impression: Chronic and age-related changes, as described, including old lacunar

infarcts.

 

Negative for acute intracranial bleed or mass effect

 

This agrees with the preliminary interpretation provided overnight by [facility]

teleradiology service.

 

 

 

 

 

The CT scanner at [facility] is accredited by the American College of

Radiology and the scans are performed using protocols designed to limit radiation

exposure to as low as reasonably achievable to attain images of sufficient resolution

adequate for diagnostic evaluation.

## 2019-04-05 NOTE — DIAGNOSTIC IMAGING REPORT
Indication: Chest pain

 

Technique: One view of the chest

 

Comparison: 5/24/2017

 

Findings: Lungs and pleural spaces are clear. Heart size is normal

 

Impression: No acute process

## 2019-04-07 ENCOUNTER — HOSPITAL ENCOUNTER (EMERGENCY)
Dept: HOSPITAL 72 - EMR | Age: 54
LOS: 1 days | Discharge: ANOTHER HOSPITAL | End: 2019-04-08
Payer: MEDICARE

## 2019-04-07 VITALS — WEIGHT: 182 LBS | BODY MASS INDEX: 29.25 KG/M2 | HEIGHT: 66 IN

## 2019-04-07 VITALS — SYSTOLIC BLOOD PRESSURE: 190 MMHG | DIASTOLIC BLOOD PRESSURE: 119 MMHG

## 2019-04-07 VITALS — DIASTOLIC BLOOD PRESSURE: 117 MMHG | SYSTOLIC BLOOD PRESSURE: 161 MMHG

## 2019-04-07 DIAGNOSIS — G81.94: ICD-10-CM

## 2019-04-07 DIAGNOSIS — Z88.8: ICD-10-CM

## 2019-04-07 DIAGNOSIS — Z98.1: ICD-10-CM

## 2019-04-07 DIAGNOSIS — I10: ICD-10-CM

## 2019-04-07 DIAGNOSIS — I24.9: Primary | ICD-10-CM

## 2019-04-07 LAB
ADD MANUAL DIFF: NO
BASOPHILS NFR BLD AUTO: 2 % (ref 0–2)
CK SERPL-CCNC: 7397 U/L (ref 26–308)
EOSINOPHIL NFR BLD AUTO: 6 % (ref 0–3)
ERYTHROCYTE [DISTWIDTH] IN BLOOD BY AUTOMATED COUNT: 13.6 % (ref 11.6–14.8)
HCT VFR BLD CALC: 39.5 % (ref 42–52)
HGB BLD-MCNC: 12.8 G/DL (ref 14.2–18)
LYMPHOCYTES NFR BLD AUTO: 21.7 % (ref 20–45)
MCV RBC AUTO: 92 FL (ref 80–99)
MONOCYTES NFR BLD AUTO: 6 % (ref 1–10)
NEUTROPHILS NFR BLD AUTO: 64.2 % (ref 45–75)
PLATELET # BLD: 205 K/UL (ref 150–450)
RBC # BLD AUTO: 4.29 M/UL (ref 4.7–6.1)
WBC # BLD AUTO: 9.3 K/UL (ref 4.8–10.8)

## 2019-04-07 PROCEDURE — 85025 COMPLETE CBC W/AUTO DIFF WBC: CPT

## 2019-04-07 PROCEDURE — 96361 HYDRATE IV INFUSION ADD-ON: CPT

## 2019-04-07 PROCEDURE — 93005 ELECTROCARDIOGRAM TRACING: CPT

## 2019-04-07 PROCEDURE — 96376 TX/PRO/DX INJ SAME DRUG ADON: CPT

## 2019-04-07 PROCEDURE — 99284 EMERGENCY DEPT VISIT MOD MDM: CPT

## 2019-04-07 PROCEDURE — 82550 ASSAY OF CK (CPK): CPT

## 2019-04-07 PROCEDURE — 36415 COLL VENOUS BLD VENIPUNCTURE: CPT

## 2019-04-07 PROCEDURE — 80048 BASIC METABOLIC PNL TOTAL CA: CPT

## 2019-04-07 PROCEDURE — 96374 THER/PROPH/DIAG INJ IV PUSH: CPT

## 2019-04-07 PROCEDURE — 84484 ASSAY OF TROPONIN QUANT: CPT

## 2019-04-07 PROCEDURE — 83880 ASSAY OF NATRIURETIC PEPTIDE: CPT

## 2019-04-07 NOTE — EMERGENCY ROOM REPORT
History of Present Illness


General


Chief Complaint:  Chest Pain


Source:  Patient, Medical Record





Present Illness


HPI


Patient presents back with continued chest pain reports that he was here 

recently


I do recall seeing the patient


Patient reports that the pain has been off-and-on since then





Denies any vomiting denies any diarrhea denies any shortness of breath


Pain is across the upper chest area at times sharp at times aching





Denies any pleurisy


Patient again reports that he has switched insurance and does not have [facility]


Allergies:  


Coded Allergies:  


     KETOROLAC (Unverified  Adverse Reaction, Intermediate, n/v, 4/7/19)


Uncoded Allergies:  


     TORODOL (Allergy, Unknown, 10/21/16)





Patient History


Past Medical History:  see triage record


Pertinent Family History:  none


Reviewed Nursing Documentation:  PMH: Agreed; PSxH: Agreed





Nursing Documentation-PMH


Past Medical History:  No History, Except For


Hx Cardiac Problems:  No - HCL


Hx Hypertension:  Yes


Hx Cancer:  No


Hx Gastrointestinal Problems:  Yes


Hx Neurological Problems:  Yes - Spinal Fusion and stroke 2011(Lt. side deficit)

, Neuralgia & neuritis


Hx Cerebrovascular Accident:  Yes - CVA w/ Lt side weaknedss 2011,2017


Hx Neurologic Surgery:  Yes - Craniotomy at [facility]/ Intracranial hemorrage





Review of Systems


All Other Systems:  negative except mentioned in HPI





Physical Exam





Vital Signs








  Date Time  Temp Pulse Resp B/P (MAP) Pulse Ox O2 Delivery O2 Flow Rate FiO2


 


4/7/19 22:03 98.2 83 18 193/117 97 Room Air  








Sp02 EP Interpretation:  reviewed, normal


General Appearance:  well appearing, no apparent distress


Head:  normocephalic, atraumatic


Eyes:  bilateral eye PERRL, bilateral eye EOMI


ENT:  hearing grossly normal, normal pharynx, TMs + canals normal, uvula midline


Neck:  full range of motion, supple, no meningismus, no bony tend


Respiratory:  lungs clear, normal breath sounds, no rhonchi, no respiratory 

distress, no retraction, no accessory muscle use


Cardiovascular #1:  normal peripheral pulses, regular rate, rhythm, no edema, 

no gallop, no JVD, no murmur


Gastrointestinal:  normal bowel sounds, non tender, soft, no mass, no 

organomegaly, non-distended, no guarding, no hernia, no pulsatile mass, no 

rebound


Genitourinary:  no CVA tenderness


Musculoskeletal:  normal inspection


Neurologic:  oriented x3, responsive, CNs III-XII nml as tested, motor strength/

tone normal, sensory intact


Psychiatric:  mood/affect normal


Skin:  normal color, no rash, warm/dry, palpation normal


Lymphatic:  normal inspection, no adenopathy





Medical Decision Making


Diagnostic Impression:  


 Primary Impression:  


 ACS (acute coronary syndrome)


ER Course


Patient is a fairly complex patient with multiple differential to consideration 

including but not limited to cardiac cardiopulmonary and vascular emergencies





Patient's troponin today presents back at intermediate level





Patient's EKG does not show any change from previous however given the repeat 

presentation admission was requested by [facility] and patient transferred for 

continued care





Labs








Test


  4/7/19


22:17 4/7/19


23:30


 


White Blood Count


  9.3 K/UL


(4.8-10.8) 


 


 


Red Blood Count


  4.29 M/UL


(4.70-6.10) 


 


 


Hemoglobin


  12.8 G/DL


(14.2-18.0) 


 


 


Hematocrit


  39.5 %


(42.0-52.0) 


 


 


Mean Corpuscular Volume 92 FL (80-99)  


 


Mean Corpuscular Hemoglobin


  29.9 PG


(27.0-31.0) 


 


 


Mean Corpuscular Hemoglobin


Concent 32.5 G/DL


(32.0-36.0) 


 


 


Red Cell Distribution Width


  13.6 %


(11.6-14.8) 


 


 


Platelet Count


  205 K/UL


(150-450) 


 


 


Mean Platelet Volume


  8.0 FL


(6.5-10.1) 


 


 


Neutrophils (%) (Auto)


  64.2 %


(45.0-75.0) 


 


 


Lymphocytes (%) (Auto)


  21.7 %


(20.0-45.0) 


 


 


Monocytes (%) (Auto)


  6.0 %


(1.0-10.0) 


 


 


Eosinophils (%) (Auto)


  6.0 %


(0.0-3.0) 


 


 


Basophils (%) (Auto)


  2.0 %


(0.0-2.0) 


 


 


Total Creatine Kinase


  7397 U/L


() 


 


 


Troponin I


  0.066 ng/mL


(0.000-0.056) 


 


 


Pro-B-Type Natriuretic Peptide


  279 pg/mL


(0-125) 


 


 


Sodium Level


  


  142 MMOL/L


(136-145)


 


Potassium Level


  


  3.7 MMOL/L


(3.5-5.1)


 


Chloride Level


  


  105 MMOL/L


()


 


Carbon Dioxide Level


  


  29 MMOL/L


(21-32)


 


Anion Gap


  


  8 mmol/L


(5-15)


 


Blood Urea Nitrogen


  


  10 mg/dL


(7-18)


 


Creatinine


  


  1.8 MG/DL


(0.55-1.30)


 


Estimat Glomerular Filtration


Rate 


  48.5 mL/min


(>60)


 


Glucose Level


  


  99 MG/DL


()


 


Calcium Level


  


  8.4 MG/DL


(8.5-10.1)








EKG Diagnostic Results


Rate:  normal


Rhythm:  NSR


ST Segments:  other - LVH





Rhythm Strip Diag. Results


EP Interpretation:  yes


Rate:  70


Rhythm:  NSR, no PVC's, no ectopy





Chest X-Ray Diagnostic Results


Chest X-Ray Diagnostic Results :  


   Chest X-Ray Ordered:  Yes - this exam is from 4/4/2019


   # of Views/Limited/Complete:  1 View


   Indication:  Chest Pain


   EP Interpretation:  Yes


   Interpretation:  no consolidation, no effusion, no pneumothorax


   Impression:  No acute disease - from 4/4/2019


   Electronically Signed by:  Viral Yates DO





Last Vital Signs








  Date Time  Temp Pulse Resp B/P (MAP) Pulse Ox O2 Delivery O2 Flow Rate FiO2


 


4/7/19 22:03 98.2 83 18 193/117 97 Room Air  








Status:  improved


Disposition:  XFER SHT-TRM HOSP


Condition:  Serious











Viral Yates DO Apr 7, 2019 22:15

## 2019-04-08 VITALS — SYSTOLIC BLOOD PRESSURE: 159 MMHG | DIASTOLIC BLOOD PRESSURE: 96 MMHG

## 2019-04-08 VITALS — DIASTOLIC BLOOD PRESSURE: 96 MMHG | SYSTOLIC BLOOD PRESSURE: 159 MMHG

## 2019-04-08 LAB
ANION GAP SERPL CALC-SCNC: 8 MMOL/L (ref 5–15)
BUN SERPL-MCNC: 10 MG/DL (ref 7–18)
CALCIUM SERPL-MCNC: 8.4 MG/DL (ref 8.5–10.1)
CHLORIDE SERPL-SCNC: 105 MMOL/L (ref 98–107)
CO2 SERPL-SCNC: 29 MMOL/L (ref 21–32)
CREAT SERPL-MCNC: 1.8 MG/DL (ref 0.55–1.3)
POTASSIUM SERPL-SCNC: 3.7 MMOL/L (ref 3.5–5.1)
SODIUM SERPL-SCNC: 142 MMOL/L (ref 136–145)

## 2019-04-08 NOTE — CARDIOLOGY REPORT
--------------- APPROVED REPORT --------------





EKG Measurement

Heart [id number]

NV 

160P63

[id number]

[id number]

[id number]





Normal sinus rhythm with sinus arrhythmia

Minimal voltage criteria for LVH, may be normal variant

Borderline ECG

## 2019-04-23 ENCOUNTER — HOSPITAL ENCOUNTER (EMERGENCY)
Dept: HOSPITAL 72 - EMR | Age: 54
Discharge: HOME | End: 2019-04-23
Payer: MEDICARE

## 2019-04-23 VITALS — WEIGHT: 180 LBS | HEIGHT: 66 IN | BODY MASS INDEX: 28.93 KG/M2

## 2019-04-23 VITALS — DIASTOLIC BLOOD PRESSURE: 78 MMHG | SYSTOLIC BLOOD PRESSURE: 121 MMHG

## 2019-04-23 DIAGNOSIS — Z98.1: ICD-10-CM

## 2019-04-23 DIAGNOSIS — Z88.8: ICD-10-CM

## 2019-04-23 DIAGNOSIS — I10: ICD-10-CM

## 2019-04-23 DIAGNOSIS — R10.9: ICD-10-CM

## 2019-04-23 DIAGNOSIS — R07.9: Primary | ICD-10-CM

## 2019-04-23 DIAGNOSIS — R06.02: ICD-10-CM

## 2019-04-23 DIAGNOSIS — G81.94: ICD-10-CM

## 2019-04-23 DIAGNOSIS — M79.602: ICD-10-CM

## 2019-04-23 LAB
ADD MANUAL DIFF: NO
ALBUMIN SERPL-MCNC: 3.7 G/DL (ref 3.4–5)
ALBUMIN/GLOB SERPL: 1.2 {RATIO} (ref 1–2.7)
ALP SERPL-CCNC: 38 U/L (ref 46–116)
ALT SERPL-CCNC: 21 U/L (ref 12–78)
ANION GAP SERPL CALC-SCNC: 11 MMOL/L (ref 5–15)
AST SERPL-CCNC: 15 U/L (ref 15–37)
BASOPHILS NFR BLD AUTO: 1.6 % (ref 0–2)
BILIRUB SERPL-MCNC: 0.5 MG/DL (ref 0.2–1)
BUN SERPL-MCNC: 9 MG/DL (ref 7–18)
CALCIUM SERPL-MCNC: 8.6 MG/DL (ref 8.5–10.1)
CHLORIDE SERPL-SCNC: 107 MMOL/L (ref 98–107)
CK MB SERPL-MCNC: 1.6 NG/ML (ref 0–3.6)
CK SERPL-CCNC: 485 U/L (ref 26–308)
CO2 SERPL-SCNC: 25 MMOL/L (ref 21–32)
CREAT SERPL-MCNC: 2.1 MG/DL (ref 0.55–1.3)
EOSINOPHIL NFR BLD AUTO: 5 % (ref 0–3)
ERYTHROCYTE [DISTWIDTH] IN BLOOD BY AUTOMATED COUNT: 13.6 % (ref 11.6–14.8)
GLOBULIN SER-MCNC: 3.2 G/DL
HCT VFR BLD CALC: 38.7 % (ref 42–52)
HGB BLD-MCNC: 12.3 G/DL (ref 14.2–18)
LYMPHOCYTES NFR BLD AUTO: 26.8 % (ref 20–45)
MCV RBC AUTO: 92 FL (ref 80–99)
MONOCYTES NFR BLD AUTO: 6.3 % (ref 1–10)
NEUTROPHILS NFR BLD AUTO: 60.3 % (ref 45–75)
PLATELET # BLD: 166 K/UL (ref 150–450)
POTASSIUM SERPL-SCNC: 3.6 MMOL/L (ref 3.5–5.1)
RBC # BLD AUTO: 4.19 M/UL (ref 4.7–6.1)
SODIUM SERPL-SCNC: 143 MMOL/L (ref 136–145)
WBC # BLD AUTO: 8 K/UL (ref 4.8–10.8)

## 2019-04-23 PROCEDURE — 85025 COMPLETE CBC W/AUTO DIFF WBC: CPT

## 2019-04-23 PROCEDURE — 84484 ASSAY OF TROPONIN QUANT: CPT

## 2019-04-23 PROCEDURE — 71045 X-RAY EXAM CHEST 1 VIEW: CPT

## 2019-04-23 PROCEDURE — 93005 ELECTROCARDIOGRAM TRACING: CPT

## 2019-04-23 PROCEDURE — 99283 EMERGENCY DEPT VISIT LOW MDM: CPT

## 2019-04-23 PROCEDURE — 80053 COMPREHEN METABOLIC PANEL: CPT

## 2019-04-23 PROCEDURE — 82550 ASSAY OF CK (CPK): CPT

## 2019-04-23 PROCEDURE — 82553 CREATINE MB FRACTION: CPT

## 2019-04-23 PROCEDURE — 36415 COLL VENOUS BLD VENIPUNCTURE: CPT

## 2019-04-23 NOTE — NUR
ED Nurse Note:

Patient walk in c/o sharp pain radiating to stomach and right arm since 1500. 
Pain started while walking. Patient reports SOB. IV ACCESS ESTABLISHED. BLOOD 
COLLECTED; SENT DOWN TO LAB. AOZhao. NAD. CONSUELO

## 2019-04-23 NOTE — EMERGENCY ROOM REPORT
History of Present Illness


General


Chief Complaint:  Chest Pain


Source:  Patient





Present Illness


HPI


Is a 53-year-old male with a history of high blood pressure.  He presents with 

chief point of chest pain.  This is a recurrent issue for him.  He was walking 

up the steps when his felt short of breath.  Felt some chest pressure.  He said 

the pain radiated to his left arm and to his abdomen area down to his groin 

area.  Pain is 7 out of 10.  Pressure-like.  Similar symptom in the past.  No 

pain now.  No nausea no vomiting.  No fever chills.  The has been here 3 times 

this month for the same thing.  He was transferred to [place] a couple weeks 

ago.  He was kept for observation was negative.  Earlier this year he was at 

[facility] and had an angiogram done.  According to the note from the discharge 

paperwork here and the patient, he had a small blockage to one of the artery 

that did not require stenting.  It was medical management.


Allergies:  


Coded Allergies:  


     KETOROLAC (Unverified  Adverse Reaction, Intermediate, n/v, 4/7/19)


Uncoded Allergies:  


     TORODOL (Allergy, Unknown, 10/21/16)





Patient History


Past Medical History:  see triage record, old chart reviewed, HTN


Past Surgical History:  none


Pertinent Family History:  none


Social History:  Denies: smoking


Immunizations:  other


Reviewed Nursing Documentation:  PMH: Agreed; PSxH: Agreed





Nursing Documentation-PMH


Past Medical History:  No History, Except For


Hx Cardiac Problems:  No - HCL


Hx Hypertension:  Yes


Hx Cancer:  No


Hx Gastrointestinal Problems:  Yes


Hx Neurological Problems:  Yes - Spinal Fusion and stroke 2011(Lt. side deficit)

, Neuralgia & neuritis


Hx Cerebrovascular Accident:  Yes - CVA w/ Lt side weaknedss 2011,2017


Hx Neurologic Surgery:  Yes - Craniotomy at [facility]/ Intracranial hemorrage





Review of Systems


Eye:  Denies: eye pain, blurred vision


ENT:  Denies: ear pain, nose congestion, throat swelling


Respiratory:  Denies: cough, shortness of breath


Cardiovascular:  Reports: chest pain; Denies: palpitations


Gastrointestinal:  Denies: abdominal pain, diarrhea, nausea, vomiting


Musculoskeletal:  Denies: back pain, joint pain


Skin:  Denies: rash


Neurological:  Denies: headache, numbness


Endocrine:  Denies: increased thirst, increased urine


Hematologic/Lymphatic:  Denies: easy bruising


All Other Systems:  negative except mentioned in HPI





Physical Exam





Vital Signs








  Date Time  Temp Pulse Resp B/P (MAP) Pulse Ox O2 Delivery O2 Flow Rate FiO2


 


4/23/19 21:27 98.6 101 16 121/78 96 Room Air  





vitals normal


Sp02 EP Interpretation:  reviewed, normal


General Appearance:  well appearing, no apparent distress, alert


Head:  normocephalic, atraumatic


Eyes:  bilateral eye PERRL, bilateral eye EOMI


ENT:  hearing grossly normal, normal pharynx


Neck:  full range of motion, supple, no meningismus


Respiratory:  chest non-tender, lungs clear, normal breath sounds


Cardiovascular #1:  regular rate, rhythm, no murmur


Gastrointestinal:  normal bowel sounds, non tender, no mass, no organomegaly, 

no bruit, non-distended


Musculoskeletal:  back normal, gait/station normal, normal range of motion


Psychiatric:  mood/affect normal


Skin:  warm/dry





Medical Decision Making


Diagnostic Impression:  


 Primary Impression:  


 Chest pain


 Qualified Codes:  R07.9 - Chest pain, unspecified


ER Course


Patient with recurrent chest pain.  No evidence of ACS, PE, dissection to name 

a few.  We'll discharge home.


EKG Diagnostic Results


Rate:  normal


Rhythm:  NSR


ST Segments:  no acute changes


ASA given to the pt in ED:  Yes





Rhythm Strip Diag. Results


EP Interpretation:  yes


Rate:  88


Rhythm:  NSR, no PVC's, no ectopy





Last Vital Signs








  Date Time  Temp Pulse Resp B/P (MAP) Pulse Ox O2 Delivery O2 Flow Rate FiO2


 


4/23/19 21:27 98.6 101 16 121/78 96 Room Air  








Status:  improved


Disposition:  HOME, SELF-CARE


Condition:  Stable


Scripts


Isosorbide Dinitrate (ISOSORBIDE DINITRATE*) 5 Mg Tablet


5 MG ORAL DAILY, #30 TAB 0 Refills


   Prov: Ruiz Ambrocio MD         4/23/19


Patient Instructions:  Nonspecific Chest Pain





Additional Instructions:  


Follow-up with your doctor in 7 days.  Return if symptom worsen.











Ruiz Ambrocio MD Apr 23, 2019 21:51

## 2019-04-24 NOTE — DIAGNOSTIC IMAGING REPORT
Indication: Chest pain, shortness of breath

 

Technique: One view of the chest

 

Comparison: 4/5/2019

 

Findings: Inspiration is suboptimal. Lungs and pleural spaces are clear. The heart is

upper limits normal in size. No significant interim change

 

Impression: No acute process

## 2019-04-26 NOTE — CARDIOLOGY REPORT
--------------- APPROVED REPORT --------------





EKG Measurement

Heart [id number]

MT [id number]

[id number]

[id number]

[id number]





Normal sinus rhythm

Minimal voltage criteria for LVH, may be normal variant

Borderline ECG

## 2019-10-19 ENCOUNTER — HOSPITAL ENCOUNTER (INPATIENT)
Dept: HOSPITAL 72 - EMR | Age: 54
LOS: 3 days | Discharge: HOME | DRG: 281 | End: 2019-10-22
Payer: MEDICARE

## 2019-10-19 VITALS — SYSTOLIC BLOOD PRESSURE: 184 MMHG | DIASTOLIC BLOOD PRESSURE: 100 MMHG

## 2019-10-19 VITALS — DIASTOLIC BLOOD PRESSURE: 87 MMHG | SYSTOLIC BLOOD PRESSURE: 161 MMHG

## 2019-10-19 VITALS — BODY MASS INDEX: 29.73 KG/M2 | WEIGHT: 185 LBS | HEIGHT: 66 IN

## 2019-10-19 VITALS — DIASTOLIC BLOOD PRESSURE: 98 MMHG | SYSTOLIC BLOOD PRESSURE: 146 MMHG

## 2019-10-19 VITALS — DIASTOLIC BLOOD PRESSURE: 116 MMHG | SYSTOLIC BLOOD PRESSURE: 200 MMHG

## 2019-10-19 DIAGNOSIS — Z88.8: ICD-10-CM

## 2019-10-19 DIAGNOSIS — Z98.1: ICD-10-CM

## 2019-10-19 DIAGNOSIS — N18.9: ICD-10-CM

## 2019-10-19 DIAGNOSIS — I69.354: ICD-10-CM

## 2019-10-19 DIAGNOSIS — I12.9: ICD-10-CM

## 2019-10-19 DIAGNOSIS — I21.4: Primary | ICD-10-CM

## 2019-10-19 LAB
ADD MANUAL DIFF: NO
ALBUMIN SERPL-MCNC: 4.5 G/DL (ref 3.4–5)
ALBUMIN/GLOB SERPL: 1.3 {RATIO} (ref 1–2.7)
ALP SERPL-CCNC: 46 U/L (ref 46–116)
ALT SERPL-CCNC: 20 U/L (ref 12–78)
ANION GAP SERPL CALC-SCNC: 12 MMOL/L (ref 5–15)
AST SERPL-CCNC: 24 U/L (ref 15–37)
BASOPHILS NFR BLD AUTO: 1.4 % (ref 0–2)
BILIRUB SERPL-MCNC: 0.9 MG/DL (ref 0.2–1)
BUN SERPL-MCNC: 12 MG/DL (ref 7–18)
CALCIUM SERPL-MCNC: 9.8 MG/DL (ref 8.5–10.1)
CHLORIDE SERPL-SCNC: 105 MMOL/L (ref 98–107)
CK MB SERPL-MCNC: 1.8 NG/ML (ref 0–3.6)
CK SERPL-CCNC: 422 U/L (ref 26–308)
CO2 SERPL-SCNC: 23 MMOL/L (ref 21–32)
CREAT SERPL-MCNC: 1.7 MG/DL (ref 0.55–1.3)
EOSINOPHIL NFR BLD AUTO: 2.7 % (ref 0–3)
ERYTHROCYTE [DISTWIDTH] IN BLOOD BY AUTOMATED COUNT: 13.4 % (ref 11.6–14.8)
GLOBULIN SER-MCNC: 3.4 G/DL
HCT VFR BLD CALC: 44 % (ref 42–52)
HGB BLD-MCNC: 14.7 G/DL (ref 14.2–18)
LYMPHOCYTES NFR BLD AUTO: 17.8 % (ref 20–45)
MCV RBC AUTO: 92 FL (ref 80–99)
MONOCYTES NFR BLD AUTO: 6.7 % (ref 1–10)
NEUTROPHILS NFR BLD AUTO: 71.4 % (ref 45–75)
PLATELET # BLD: 246 K/UL (ref 150–450)
POTASSIUM SERPL-SCNC: 4.6 MMOL/L (ref 3.5–5.1)
RBC # BLD AUTO: 4.79 M/UL (ref 4.7–6.1)
SODIUM SERPL-SCNC: 140 MMOL/L (ref 136–145)
WBC # BLD AUTO: 10 K/UL (ref 4.8–10.8)

## 2019-10-19 PROCEDURE — 84484 ASSAY OF TROPONIN QUANT: CPT

## 2019-10-19 PROCEDURE — 99291 CRITICAL CARE FIRST HOUR: CPT

## 2019-10-19 PROCEDURE — 85025 COMPLETE CBC W/AUTO DIFF WBC: CPT

## 2019-10-19 PROCEDURE — 96376 TX/PRO/DX INJ SAME DRUG ADON: CPT

## 2019-10-19 PROCEDURE — 83880 ASSAY OF NATRIURETIC PEPTIDE: CPT

## 2019-10-19 PROCEDURE — 36415 COLL VENOUS BLD VENIPUNCTURE: CPT

## 2019-10-19 PROCEDURE — 93005 ELECTROCARDIOGRAM TRACING: CPT

## 2019-10-19 PROCEDURE — 96374 THER/PROPH/DIAG INJ IV PUSH: CPT

## 2019-10-19 PROCEDURE — 80053 COMPREHEN METABOLIC PANEL: CPT

## 2019-10-19 PROCEDURE — 85610 PROTHROMBIN TIME: CPT

## 2019-10-19 PROCEDURE — 82553 CREATINE MB FRACTION: CPT

## 2019-10-19 PROCEDURE — 71045 X-RAY EXAM CHEST 1 VIEW: CPT

## 2019-10-19 PROCEDURE — 85730 THROMBOPLASTIN TIME PARTIAL: CPT

## 2019-10-19 PROCEDURE — 82550 ASSAY OF CK (CPK): CPT

## 2019-10-19 PROCEDURE — 96375 TX/PRO/DX INJ NEW DRUG ADDON: CPT

## 2019-10-19 PROCEDURE — 93306 TTE W/DOPPLER COMPLETE: CPT

## 2019-10-19 NOTE — DIAGNOSTIC IMAGING REPORT
EXAM:

  XR Chest, 1 View

 

CLINICAL HISTORY:

  PAIN

 

TECHNIQUE:

  Frontal view of the chest.

 

COMPARISON:

  No relevant prior studies available.

 

FINDINGS:

  Lungs:  Unremarkable.  No consolidation.

  Pleural space:  Unremarkable.  No pneumothorax.

  Heart:  Unremarkable.  No cardiomegaly.

  Mediastinum:  Unremarkable.

  Bones joints:  Unremarkable.

 

IMPRESSION:     

  No acute cardiopulmonary abnormality.

## 2019-10-19 NOTE — NUR
ED Nurse Note:



Pt medicated with pain and HTN meds, both not effective, b/p remains elevated 
and pt pain level at 8/10, MD informed immediately, pt being admitted to 
hospital, waiting for new orders, will continue to closely monitor.

## 2019-10-19 NOTE — NUR
NURSE NOTES:

Received report from SIMIN Segura ED. Patient was transferred from ED to telemetry without any 
incident. Patient is awake, A/Ox4. Denies pain at this time. No signs of acute distress. 
Checked IV site and flushed. No erythema, bleeding or infiltration noted. Patient is placed 
on tele box, ST on the monitor, 110bpm. Body assessment done with no skin issues. Belonging 
list checked with transferring RN. Bed at lowest position, brakes on, siderails x2. Call 
light within reach. Will continue to monitor. 



[id number] Called Dr. Lam for admitting orders. Spoke with Dr. Lam received admitting 
orders. Noted and carried out.

## 2019-10-19 NOTE — NUR
ED Nurse Note:



Pt medicated for elevated b/p, meds now effective, b/p has decreased to normal 
limit, pt continues to c/o chest pain at 9/10, MD aware, no new orders given, 
IV site patent, report called to floor nurse SIMIN Pineda, pt being taken to floor 
unit via gurney with RN and ER-Tech on ACLS protocol with monitoring. NAD noted 
during pt transporting.

## 2019-10-19 NOTE — NUR
ED Nurse Note:



med recon done at the bedside, pt reports no changes in medication from 
previous visit, med list went over with patient.

## 2019-10-19 NOTE — NUR
ED Nurse Note:



Recived pt from home, here with c/o chest pain radiating to back at 10/10 since 
am, pt alsohas sob, pt is awake, alert and orieted x 4, immeidately gowned and 
placed on cardiac monitoirng, ekg, IV line and labs done also, MD at bedside, 
will resume care as ordered and continue to closely monitor.

## 2019-10-19 NOTE — NUR
ED Nurse Note:



Pt medicated with b/p med second time, not effective, MD is aware, pt being 
medicated again to send to floor bed for admission, tp also c/o continued chest 
pain, MD also is aware.

## 2019-10-20 VITALS — SYSTOLIC BLOOD PRESSURE: 91 MMHG | DIASTOLIC BLOOD PRESSURE: 46 MMHG

## 2019-10-20 VITALS — DIASTOLIC BLOOD PRESSURE: 74 MMHG | SYSTOLIC BLOOD PRESSURE: 112 MMHG

## 2019-10-20 VITALS — DIASTOLIC BLOOD PRESSURE: 104 MMHG | SYSTOLIC BLOOD PRESSURE: 146 MMHG

## 2019-10-20 VITALS — SYSTOLIC BLOOD PRESSURE: 113 MMHG | DIASTOLIC BLOOD PRESSURE: 49 MMHG

## 2019-10-20 VITALS — DIASTOLIC BLOOD PRESSURE: 55 MMHG | SYSTOLIC BLOOD PRESSURE: 84 MMHG

## 2019-10-20 VITALS — SYSTOLIC BLOOD PRESSURE: 99 MMHG | DIASTOLIC BLOOD PRESSURE: 85 MMHG

## 2019-10-20 VITALS — SYSTOLIC BLOOD PRESSURE: 110 MMHG | DIASTOLIC BLOOD PRESSURE: 65 MMHG

## 2019-10-20 VITALS — DIASTOLIC BLOOD PRESSURE: 68 MMHG | SYSTOLIC BLOOD PRESSURE: 109 MMHG

## 2019-10-20 VITALS — DIASTOLIC BLOOD PRESSURE: 68 MMHG | SYSTOLIC BLOOD PRESSURE: 113 MMHG

## 2019-10-20 VITALS — DIASTOLIC BLOOD PRESSURE: 56 MMHG | SYSTOLIC BLOOD PRESSURE: 106 MMHG

## 2019-10-20 VITALS — DIASTOLIC BLOOD PRESSURE: 86 MMHG | SYSTOLIC BLOOD PRESSURE: 113 MMHG

## 2019-10-20 VITALS — SYSTOLIC BLOOD PRESSURE: 140 MMHG | DIASTOLIC BLOOD PRESSURE: 86 MMHG

## 2019-10-20 VITALS — SYSTOLIC BLOOD PRESSURE: 128 MMHG | DIASTOLIC BLOOD PRESSURE: 84 MMHG

## 2019-10-20 VITALS — DIASTOLIC BLOOD PRESSURE: 80 MMHG | SYSTOLIC BLOOD PRESSURE: 125 MMHG

## 2019-10-20 VITALS — DIASTOLIC BLOOD PRESSURE: 97 MMHG | SYSTOLIC BLOOD PRESSURE: 144 MMHG

## 2019-10-20 VITALS — DIASTOLIC BLOOD PRESSURE: 87 MMHG | SYSTOLIC BLOOD PRESSURE: 126 MMHG

## 2019-10-20 VITALS — DIASTOLIC BLOOD PRESSURE: 91 MMHG | SYSTOLIC BLOOD PRESSURE: 144 MMHG

## 2019-10-20 VITALS — SYSTOLIC BLOOD PRESSURE: 125 MMHG | DIASTOLIC BLOOD PRESSURE: 76 MMHG

## 2019-10-20 VITALS — DIASTOLIC BLOOD PRESSURE: 50 MMHG | SYSTOLIC BLOOD PRESSURE: 89 MMHG

## 2019-10-20 VITALS — SYSTOLIC BLOOD PRESSURE: 130 MMHG | DIASTOLIC BLOOD PRESSURE: 89 MMHG

## 2019-10-20 VITALS — DIASTOLIC BLOOD PRESSURE: 89 MMHG | SYSTOLIC BLOOD PRESSURE: 121 MMHG

## 2019-10-20 VITALS — DIASTOLIC BLOOD PRESSURE: 66 MMHG | SYSTOLIC BLOOD PRESSURE: 119 MMHG

## 2019-10-20 VITALS — SYSTOLIC BLOOD PRESSURE: 130 MMHG | DIASTOLIC BLOOD PRESSURE: 90 MMHG

## 2019-10-20 VITALS — DIASTOLIC BLOOD PRESSURE: 50 MMHG | SYSTOLIC BLOOD PRESSURE: 99 MMHG

## 2019-10-20 LAB
ADD MANUAL DIFF: NO
ALBUMIN SERPL-MCNC: 4 G/DL (ref 3.4–5)
ALBUMIN/GLOB SERPL: 1.1 {RATIO} (ref 1–2.7)
ALP SERPL-CCNC: 44 U/L (ref 46–116)
ALT SERPL-CCNC: 19 U/L (ref 12–78)
ANION GAP SERPL CALC-SCNC: 6 MMOL/L (ref 5–15)
APTT BLD: 27 SEC (ref 23–33)
AST SERPL-CCNC: 33 U/L (ref 15–37)
BASOPHILS NFR BLD AUTO: 1.1 % (ref 0–2)
BILIRUB SERPL-MCNC: 0.8 MG/DL (ref 0.2–1)
BUN SERPL-MCNC: 17 MG/DL (ref 7–18)
CALCIUM SERPL-MCNC: 9.1 MG/DL (ref 8.5–10.1)
CHLORIDE SERPL-SCNC: 105 MMOL/L (ref 98–107)
CO2 SERPL-SCNC: 28 MMOL/L (ref 21–32)
CREAT SERPL-MCNC: 2.1 MG/DL (ref 0.55–1.3)
EOSINOPHIL NFR BLD AUTO: 1.3 % (ref 0–3)
ERYTHROCYTE [DISTWIDTH] IN BLOOD BY AUTOMATED COUNT: 13 % (ref 11.6–14.8)
GLOBULIN SER-MCNC: 3.6 G/DL
HCT VFR BLD CALC: 43 % (ref 42–52)
HGB BLD-MCNC: 14.3 G/DL (ref 14.2–18)
INR PPP: 1 (ref 0.9–1.1)
LYMPHOCYTES NFR BLD AUTO: 18.9 % (ref 20–45)
MCV RBC AUTO: 92 FL (ref 80–99)
MONOCYTES NFR BLD AUTO: 7.1 % (ref 1–10)
NEUTROPHILS NFR BLD AUTO: 71.7 % (ref 45–75)
PLATELET # BLD: 240 K/UL (ref 150–450)
POTASSIUM SERPL-SCNC: 3.9 MMOL/L (ref 3.5–5.1)
RBC # BLD AUTO: 4.65 M/UL (ref 4.7–6.1)
SODIUM SERPL-SCNC: 139 MMOL/L (ref 136–145)
WBC # BLD AUTO: 8.4 K/UL (ref 4.8–10.8)

## 2019-10-20 RX ADMIN — CYCLOBENZAPRINE HYDROCHLORIDE SCH MG: 10 TABLET, FILM COATED ORAL at 17:40

## 2019-10-20 RX ADMIN — METOPROLOL TARTRATE SCH MG: 25 TABLET, FILM COATED ORAL at 21:49

## 2019-10-20 NOTE — NUR
HAND-OFF: 

Report given to SIMIN Mcgarry.

Dr. Crouch made aware of troponin of 4.438, 

no further orders given at this time.

## 2019-10-20 NOTE — NUR
NURSE NOTES:

PT has no chest pain "no, I'm fine thank you", when asked. Heparin 5000u IV bolus once will 
be given per order, verified with Adam Bueno.

## 2019-10-20 NOTE — NUR
NURSE NOTES:

Called Dr. Lam regarding patient requesting sleeping pills, offered warm milk,talk 
therapy, quiet and dim light room not effective. spoke with Linus, will follow up.

## 2019-10-20 NOTE — NUR
NURSE NOTES:

Late entry. R-hand 22g patent, flushes well. Heparin drip started per MD Willa orders. 
Secondary RN verification performed by Radha ZARATE RN. Baseline PTT @ 27. Drip started at 
1023, placed order for 6hr PTT at 1620 per [facility] protocol. INOCENCIA Willis made aware. Will continue 
to monitor PT.

## 2019-10-20 NOTE — NUR
NURSE NOTES:

Late entry. PT A/O X 4, on 2L-% saturation, VS /80; HR 66; RR 12. No S/S of 
respiratory distress, PT has no complaints of pain, PT states he has no chest pain. 
Bilateral upper extremity and lower extremity shows no signs of edema. L-hand strength is 
weaker at 3/5, R-hand strength at 4/5. PT acknowledges weakness on L-hand due to history of 
major stroke in 2011, and "TIA" in 2017. PT cooperative, able to ambulate but informed PT to 
stay in bed for safety reason, wrist bands for Fall Risk, and Allergies to Toradol placed on 
PT. PT states Toradol makes him nausea only, denies vomiting. PT has R-hand 20g, patent, no 
edema or S/S of infiltration, flushes well. Will continue to monitor PT.

-------------------------------------------------------------------------------

Addendum: 10/20/19 at 1046 by Zefreino Landers RN

-------------------------------------------------------------------------------

NURSE NOTES:

Amended due to wrong IV gauge; correct gauge is R-hand 22g



Late entry. PT A/O X 4, on 2L-% saturation, VS /80; HR 66; RR 12. No S/S of 
respiratory distress, PT has no complaints of pain, PT states he has no chest pain. 
Bilateral upper extremity and lower extremity shows no signs of edema. L-hand strength is 
weaker at 3/5, R-hand strength at 4/5. PT acknowledges weakness on L-hand due to history of 
major stroke in 2011, and "TIA" in 2017. PT cooperative, able to ambulate but informed PT to 
stay in bed for safety reason, wrist bands for Fall Risk, and Allergies to Toradol placed on 
PT. PT states Toradol makes him nausea only, denies vomiting. PT has R-hand 22g, patent, no 
edema or S/S of infiltration, flushes well. Will continue to monitor PT.

## 2019-10-20 NOTE — NUR
NURSE NOTES:

I received the patient awake and resting in bed.  Patient alert and oriented x4.  Patient 
complained of pain 8/10 and expressed that the Norco is not controlling his pain.  Bed in 
the lowest position and call light within reach.  Doctor contacted about pain medication 
order.  I will continue to monitor the patient and implement care.

## 2019-10-20 NOTE — CARDIAC ELECTROPHYSIOLOGY PN
Subjective


Subjective


[id number]


STEMI with rising troponin top 4.9


Need transfer to higher level of care for cardiac cath.


No CP. No CT elevation.





Objective





Last 24 Hour Vital Signs








  Date Time  Temp Pulse Resp B/P (MAP) Pulse Ox O2 Delivery O2 Flow Rate FiO2


 


10/20/19 12:00      Nasal Cannula 2.0 


 


10/20/19 12:00 98.6 101 12 126/87 (100) 100   


 


10/20/19 11:00  68 15 144/91 (108) 100   


 


10/20/19 10:00  69 12 125/80 (95) 100   


 


10/20/19 09:32 98.0 87 13 144/97 (113) 100   


 


10/20/19 09:00      Nasal Cannula 2.0 


 


10/20/19 08:37  101  146/104    


 


10/20/19 08:37  101  146/104    


 


10/20/19 08:36    146/104    


 


10/20/19 08:35    146/104    


 


10/20/19 08:00 98.6 101 20 146/104 (118) 93   


 


10/20/19 07:47  98      


 


10/20/19 06:59  105      


 


10/20/19 06:09    160/109    


 


10/20/19 04:00 98.2 87 20 140/86 (104) 98   


 


10/20/19 04:00  104      


 


10/20/19 00:00  107      


 


10/20/19 00:00 99.1 103 20 128/84 (99) 95   


 


10/19/19 23:47      Room Air  


 


10/19/19 23:00 98.2 88 18 146/98 99 Room Air  


 


10/19/19 22:40 98.2 88 18 146/98 99 Room Air  


 


10/19/19 22:12    184/100    


 


10/19/19 22:00 98.2 81 18 161/87 99 Room Air  


 


10/19/19 21:05 99.1       


 


10/19/19 21:05 99.1       


 


10/19/19 20:45 98.2 81 18 184/100 99 Room Air  


 


10/19/19 20:38    200/116    


 


10/19/19 20:00 98.2 81 18 200/116 99 Room Air  


 


10/19/19 19:30  93 18   Room Air  


 


10/19/19 19:17 98.2 93 18 183/115 (137) 98 Room Air  

















Intake and Output  


 


 10/19/19 10/20/19





 19:00 07:00


 


Intake Total  120 ml


 


Output Total  300 ml


 


Balance  -180 ml


 


  


 


Intake Oral  120 ml


 


Output Urine Total  300 ml


 


# Voids  3











Laboratory Tests








Test


  10/19/19


19:30 10/20/19


04:13 10/20/19


08:28 10/20/19


09:30


 


White Blood Count


  10.0 K/UL


(4.8-10.8) 


  


  8.4 K/UL


(4.8-10.8)


 


Red Blood Count


  4.79 M/UL


(4.70-6.10) 


  


  4.65 M/UL


(4.70-6.10)  L


 


Hemoglobin


  14.7 G/DL


(14.2-18.0) 


  


  14.3 G/DL


(14.2-18.0)


 


Hematocrit


  44.0 %


(42.0-52.0) 


  


  43.0 %


(42.0-52.0)


 


Mean Corpuscular Volume 92 FL (80-99)     92 FL (80-99)  


 


Mean Corpuscular Hemoglobin


  30.6 PG


(27.0-31.0) 


  


  30.8 PG


(27.0-31.0)


 


Mean Corpuscular Hemoglobin


Concent 33.4 G/DL


(32.0-36.0) 


  


  33.3 G/DL


(32.0-36.0)


 


Red Cell Distribution Width


  13.4 %


(11.6-14.8) 


  


  13.0 %


(11.6-14.8)


 


Platelet Count


  246 K/UL


(150-450) 


  


  240 K/UL


(150-450)


 


Mean Platelet Volume


  8.1 FL


(6.5-10.1) 


  


  7.4 FL


(6.5-10.1)


 


Neutrophils (%) (Auto)


  71.4 %


(45.0-75.0) 


  


  71.7 %


(45.0-75.0)


 


Lymphocytes (%) (Auto)


  17.8 %


(20.0-45.0)  L 


  


  18.9 %


(20.0-45.0)  L


 


Monocytes (%) (Auto)


  6.7 %


(1.0-10.0) 


  


  7.1 %


(1.0-10.0)


 


Eosinophils (%) (Auto)


  2.7 %


(0.0-3.0) 


  


  1.3 %


(0.0-3.0)


 


Basophils (%) (Auto)


  1.4 %


(0.0-2.0) 


  


  1.1 %


(0.0-2.0)


 


Sodium Level


  140 MMOL/L


(136-145) 


  


  


 


 


Potassium Level


  4.6 MMOL/L


(3.5-5.1) 


  


  


 


 


Chloride Level


  105 MMOL/L


() 


  


  


 


 


Carbon Dioxide Level


  23 MMOL/L


(21-32) 


  


  


 


 


Anion Gap


  12 mmol/L


(5-15) 


  


  


 


 


Blood Urea Nitrogen


  12 mg/dL


(7-18) 


  


  


 


 


Creatinine


  1.7 MG/DL


(0.55-1.30)  H 


  


  


 


 


Estimat Glomerular Filtration


Rate 51.5 mL/min


(>60) 


  


  


 


 


Glucose Level


  101 MG/DL


() 


  


  


 


 


Calcium Level


  9.8 MG/DL


(8.5-10.1) 


  


  


 


 


Total Bilirubin


  0.9 MG/DL


(0.2-1.0) 


  


  


 


 


Aspartate Amino Transf


(AST/SGOT) 24 U/L (15-37)


  


  


  


 


 


Alanine Aminotransferase


(ALT/SGPT) 20 U/L (12-78)


  


  


  


 


 


Alkaline Phosphatase


  46 U/L


() 


  


  


 


 


Total Creatine Kinase


  422 U/L


()  H 


  


  


 


 


Creatine Kinase MB


  1.8 NG/ML


(0.0-3.6) 


  


  


 


 


Creatine Kinase MB Relative


Index 0.4  


  


  


  


 


 


Troponin I


  0.000 ng/mL


(0.000-0.056) 2.560 ng/mL


(0.000-0.056) 4.438 ng/mL


(0.000-0.056) 


 


 


Pro-B-Type Natriuretic Peptide


  213 pg/mL


(0-125)  H 


  


  


 


 


Total Protein


  7.9 G/DL


(6.4-8.2) 


  


  


 


 


Albumin


  4.5 G/DL


(3.4-5.0) 


  


  


 


 


Globulin 3.4 g/dL     


 


Albumin/Globulin Ratio 1.3 (1.0-2.7)     


 


Prothrombin Time


  


  


  


  11.0 SEC


(9.30-11.50)


 


Prothromb Time International


Ratio 


  


  


  1.0 (0.9-1.1)  


 


 


Activated Partial


Thromboplast Time 


  


  


  27 SEC (23-33)


 


 


Test


  10/20/19


11:45 


  


  


 


 


Troponin I


  4.900 ng/mL


(0.000-0.056) 


  


  


 

















Anirudh Crouch MD Oct 20, 2019 14:23

## 2019-10-20 NOTE — NUR
NURSE NOTES:

Called and spoke with Savannah , , told her about the conversation with Nursing 
Supervisor at [facility] and she said she will take care of it in the morning.

## 2019-10-20 NOTE — NUR
NURSE NOTES:

Patient seen by Dr. Lam. Patient made aware of transfer to [facility] 
for cardiac cath by PMD. Patient agreed with transfer.

## 2019-10-20 NOTE — NUR
NURSE NOTES:

Received the patient from SIMIN Willis. Patient is awake, alert and orientedx4. On 2L O2 via 
NC. No acute distress noted. SR w/ ST depression noted on cardiac monitor. Patient denies 
any chest pain or SOB. Skin intact. Right hand 22G intact, asymptomatic, running heparin gtt 
at 12unit/kg/min. No active bleeding noted. Bed in lowest position, locked, side rails upx3. 
Call light within reach. Will continue to monitor.

-------------------------------------------------------------------------------

Addendum: 10/20/19 at 1716 by SOYEON HUH RN

-------------------------------------------------------------------------------

Correct time: received the patient from Lazaro at 1530.

## 2019-10-20 NOTE — NUR
NURSE NOTES:

Patient in bed sleeping comfortably, patient HR SB 58 and BP 77/64 when patient asleep. wake 
up patient /68 HR 72. patient denies any pain or discomfort. informed patient that BP 
and HR went down while he's sleeping. no s/s of cardiac distress noted. frequent visual 
checks continued. will continue plan of care.

## 2019-10-20 NOTE — NUR
NURSE NOTES:

called nursing supervisor at [facility] in regards to transfer. 
She stated that insurance clearance is need first and  needs to get insurance 
approval first

## 2019-10-20 NOTE — NUR
NURSE NOTES:

0200 Patient complained of chest pain, radiating to the neck, with a pain scale of 10/10. 
Offered O2 via NC @ 2LPM. EKG done which revealed NSR. Per Tech, patient is experiencing 
multiple episode of T wave inversion on the monitor. Charge nurse made aware and advised to 
wait for the result of the Troponin. Comfort care provided. Will continue to monitor. 

0620 Called Dr. Riley and reported Troponin I result of 2.560 for further orders. Awaiting 
for callback. Will continue to monitor. 

0708 Per tech, patient is experiencing 8 beats V tach. Paged Dr. Anthony of ECG result on 
the monitor. Awaiting for callback. 

0740 Hand off given to SIMIN Goldstein. Plan of care endorsed. 

0815 Received call from Dr. Riley with new orders. Endorsed to RN AM shift.

## 2019-10-20 NOTE — NUR
NURSE NOTES:

Patient in bed sleeping comfortably. no s/s of acute distress noted. Skin warm and dry to 
touch. Ambien ordered PRN insomnia, patient sleeping comfortably. Ambien not given. will 
continue to monitor patient.

## 2019-10-20 NOTE — NUR
NURSE NOTES:

Called Dr Lam again regrading patient requesting to have sleeping pills Dr Lam ordered 
Ambien PRN noted nad carried out. Patient made aware

## 2019-10-20 NOTE — NUR
NURSE NOTES:

PT given lunch, called MD Genaro in regards to obtaining orders for sleep aid for PT, PT 
states "I hasn't been able to sleep since Saturday night". Left message with MD Genaro at 
[phone] with answering service [facility] for call back with medication orders.

## 2019-10-20 NOTE — CONSULTATION
DATE OF CONSULTATION:  10/20/2019

CARDIOLOGY CONSULTATION



CONSULTING PHYSICIAN:  Anirudh Crouch M.D.



REFERRING PHYSICIAN:  Tank Lam M.D.



REASON FOR CONSULTATION:  Non-ST-elevation myocardial infarction.



HISTORY OF PRESENT ILLNESS:  The patient is a 54-year-old 

gentleman with history of hypertension and history of CVA with left

hemiplegia as well as history of craniotomy at [facility] for intracranial

hemorrhage, who also had cardiac catheterization in 2017 at [facility] that showed no critical disease.  The patient

presented to the emergency room complaining of chest pain and shortness of

breath of one-day duration.  The pain was nonradiating.  The patient still

has been compliant with his medication.  Blood pressure in the ER was also

183/115 with a pulse of 93.  The patient did not have any ST elevation.

Initial troponin was negative; however, three followup troponins were

rising at 2, 4.3, and 4.7.  Cardiology consultation was obtained for

further evaluation and management.



REVIEW OF SYSTEMS:  Negative other than what was mentioned in the history

of present illness.



PAST MEDICAL HISTORY:  As mentioned above.



FAMILY HISTORY:  Noncontributory.



SOCIAL HISTORY:  He lives at home.  Does not smoke or drink

alcohol.



PHYSICAL EXAMINATION:

VITAL SIGNS:  Blood pressure of 126/87, pulse is 101, respirations 18, and

temperature 98.6.

HEAD AND NECK:  Showed no JVD or carotid bruits.

LUNGS:  Clear.

CARDIOVASCULAR:  Shows regular S1 and S2 with no gallop or murmur.

 

ABDOMEN:  Soft.

EXTREMITIES:  No pitting edema.



LABORATORY AND DIAGNOSTIC DATA:  His labs show white count of 8.4,

hematocrit of 14.2, hematocrit of 43, and platelet count is 240,000.

Sodium 140, potassium 4.6, BUN of 12, creatinine 1.7, and glucose of 101.

Troponin initially was negative and then 2.5, 4.4, and 4.9.  His EKG shows

sinus rhythm with nonspecific ST-T wave abnormalities with no ST

elevation.



ASSESSMENT AND PLAN:

1. Non-ST-elevation myocardial infarction with rising troponin from

negative to 4.9.  He however does not have any chest pain at this time.

The patient currently is on heparin drip in intensive care unit and

aspirin, Isordil, and metoprolol.  The patient was started also on a

statin as well.  The patient would need cardiac catheterization for

further evaluation of his coronaries.  We will try to get the patient

transferred to [facility] where he had previous cardiac

catheterization at.

2. Hypertension.  Blood pressure is better now on metoprolol 25 mg

b.i.d. as well as lisinopril 5 mg daily.  The patient is also on Isordil 5

mg daily and Norvasc 2.5 mg daily.  I will discontinue Norvasc and _____

beta-blocker.

3. History of CVA and left hemiplegia.

4. History of cardiac catheterization in May 2017 that showed no

critical coronary artery disease.

5. Chronic kidney disease with creatinine of 1.7.



The case was discussed with the ICU nurse as well.



Thank you very much for allowing me to participate in the care of this

patient.  Please do not hesitate to contact me for any questions regarding

my evaluation.









  ______________________________________________

  Anirudh Crouch M.D.





DR:  BANDAR

D:  10/20/2019 14:25

T:  10/20/2019 20:49

JOB#:  [id number]

CC:

## 2019-10-20 NOTE — NUR
NURSE NOTES:



Patient transferred to ICU to room 246-E after troponin was 2.560, 

he is awake and calm, no facial grimacing or guarding over chest, 

HR is 86, /97, saturating at 100% with 2L/min with RR of 16-17, 

patient is using cellular phone and demonstrates 5/5 dexterity,

## 2019-10-20 NOTE — NUR
NURSE NOTES:

 Savannah called and instructions were given on transfer process. Will call 
nursing supervisor at [facility] for instructions.

## 2019-10-20 NOTE — NUR
NURSE NOTES:

Received the patient from SoYeon, RN. Patient is awake, alert and orientedx4. Watching TV. 
On 2L O2 via NC satting 96%No acute distress noted. Patient SR HR 81 on cardiac monitor.  
Patient denies any chest pain or SOB. Skin intact. Right hand 22G intact, asymptomatic, 
running heparin gtt at 8unit/kg/min. No active bleeding noted. Instructed patient to use 
call light for assistance. Ice water given per patient request. Bed in lowest position, 
locked, side rails upx3. Call light within reach. Will continue to monitor plan of care.

## 2019-10-20 NOTE — NUR
NURSE NOTES:

Patient's blood pressure elevated and troponin trended upward.  Night RN spoke with Dr. Crouch about the patient's conditions and he ordered the patient to be transferred to ICU.  
A stat EKG was performed and stat troponin ordered.

## 2019-10-21 VITALS — DIASTOLIC BLOOD PRESSURE: 86 MMHG | SYSTOLIC BLOOD PRESSURE: 120 MMHG

## 2019-10-21 VITALS — DIASTOLIC BLOOD PRESSURE: 73 MMHG | SYSTOLIC BLOOD PRESSURE: 114 MMHG

## 2019-10-21 VITALS — SYSTOLIC BLOOD PRESSURE: 125 MMHG | DIASTOLIC BLOOD PRESSURE: 75 MMHG

## 2019-10-21 VITALS — SYSTOLIC BLOOD PRESSURE: 131 MMHG | DIASTOLIC BLOOD PRESSURE: 89 MMHG

## 2019-10-21 VITALS — SYSTOLIC BLOOD PRESSURE: 105 MMHG | DIASTOLIC BLOOD PRESSURE: 76 MMHG

## 2019-10-21 VITALS — DIASTOLIC BLOOD PRESSURE: 77 MMHG | SYSTOLIC BLOOD PRESSURE: 124 MMHG

## 2019-10-21 VITALS — DIASTOLIC BLOOD PRESSURE: 79 MMHG | SYSTOLIC BLOOD PRESSURE: 120 MMHG

## 2019-10-21 VITALS — DIASTOLIC BLOOD PRESSURE: 71 MMHG | SYSTOLIC BLOOD PRESSURE: 119 MMHG

## 2019-10-21 VITALS — SYSTOLIC BLOOD PRESSURE: 120 MMHG | DIASTOLIC BLOOD PRESSURE: 78 MMHG

## 2019-10-21 VITALS — DIASTOLIC BLOOD PRESSURE: 65 MMHG | SYSTOLIC BLOOD PRESSURE: 109 MMHG

## 2019-10-21 VITALS — SYSTOLIC BLOOD PRESSURE: 119 MMHG | DIASTOLIC BLOOD PRESSURE: 85 MMHG

## 2019-10-21 VITALS — DIASTOLIC BLOOD PRESSURE: 72 MMHG | SYSTOLIC BLOOD PRESSURE: 97 MMHG

## 2019-10-21 VITALS — SYSTOLIC BLOOD PRESSURE: 96 MMHG | DIASTOLIC BLOOD PRESSURE: 72 MMHG

## 2019-10-21 VITALS — SYSTOLIC BLOOD PRESSURE: 103 MMHG | DIASTOLIC BLOOD PRESSURE: 64 MMHG

## 2019-10-21 VITALS — DIASTOLIC BLOOD PRESSURE: 67 MMHG | SYSTOLIC BLOOD PRESSURE: 109 MMHG

## 2019-10-21 VITALS — DIASTOLIC BLOOD PRESSURE: 62 MMHG | SYSTOLIC BLOOD PRESSURE: 100 MMHG

## 2019-10-21 VITALS — SYSTOLIC BLOOD PRESSURE: 111 MMHG | DIASTOLIC BLOOD PRESSURE: 88 MMHG

## 2019-10-21 VITALS — DIASTOLIC BLOOD PRESSURE: 82 MMHG | SYSTOLIC BLOOD PRESSURE: 107 MMHG

## 2019-10-21 VITALS — DIASTOLIC BLOOD PRESSURE: 80 MMHG | SYSTOLIC BLOOD PRESSURE: 136 MMHG

## 2019-10-21 VITALS — SYSTOLIC BLOOD PRESSURE: 114 MMHG | DIASTOLIC BLOOD PRESSURE: 79 MMHG

## 2019-10-21 VITALS — DIASTOLIC BLOOD PRESSURE: 87 MMHG | SYSTOLIC BLOOD PRESSURE: 121 MMHG

## 2019-10-21 LAB
ADD MANUAL DIFF: NO
ALBUMIN SERPL-MCNC: 3.9 G/DL (ref 3.4–5)
ALBUMIN/GLOB SERPL: 1.3 {RATIO} (ref 1–2.7)
ALP SERPL-CCNC: 40 U/L (ref 46–116)
ALT SERPL-CCNC: 20 U/L (ref 12–78)
ANION GAP SERPL CALC-SCNC: 10 MMOL/L (ref 5–15)
AST SERPL-CCNC: 24 U/L (ref 15–37)
BASOPHILS NFR BLD AUTO: 1.8 % (ref 0–2)
BILIRUB SERPL-MCNC: 0.6 MG/DL (ref 0.2–1)
BUN SERPL-MCNC: 20 MG/DL (ref 7–18)
CALCIUM SERPL-MCNC: 9 MG/DL (ref 8.5–10.1)
CHLORIDE SERPL-SCNC: 107 MMOL/L (ref 98–107)
CO2 SERPL-SCNC: 27 MMOL/L (ref 21–32)
CREAT SERPL-MCNC: 2.1 MG/DL (ref 0.55–1.3)
EOSINOPHIL NFR BLD AUTO: 5.9 % (ref 0–3)
ERYTHROCYTE [DISTWIDTH] IN BLOOD BY AUTOMATED COUNT: 12.9 % (ref 11.6–14.8)
GLOBULIN SER-MCNC: 3.1 G/DL
HCT VFR BLD CALC: 43.4 % (ref 42–52)
HGB BLD-MCNC: 14.2 G/DL (ref 14.2–18)
LYMPHOCYTES NFR BLD AUTO: 23.4 % (ref 20–45)
MCV RBC AUTO: 93 FL (ref 80–99)
MONOCYTES NFR BLD AUTO: 8 % (ref 1–10)
NEUTROPHILS NFR BLD AUTO: 61 % (ref 45–75)
PLATELET # BLD: 212 K/UL (ref 150–450)
POTASSIUM SERPL-SCNC: 4.1 MMOL/L (ref 3.5–5.1)
RBC # BLD AUTO: 4.66 M/UL (ref 4.7–6.1)
SODIUM SERPL-SCNC: 143 MMOL/L (ref 136–145)
WBC # BLD AUTO: 7 K/UL (ref 4.8–10.8)

## 2019-10-21 RX ADMIN — CYCLOBENZAPRINE HYDROCHLORIDE SCH MG: 10 TABLET, FILM COATED ORAL at 17:45

## 2019-10-21 RX ADMIN — METOPROLOL TARTRATE SCH MG: 25 TABLET, FILM COATED ORAL at 09:10

## 2019-10-21 RX ADMIN — METOPROLOL TARTRATE SCH MG: 25 TABLET, FILM COATED ORAL at 20:09

## 2019-10-21 RX ADMIN — CYCLOBENZAPRINE HYDROCHLORIDE SCH MG: 10 TABLET, FILM COATED ORAL at 09:09

## 2019-10-21 NOTE — CARDIOLOGY REPORT
--------------- APPROVED REPORT --------------





EKG Measurement

Heart [id number]

HI [id number]

[id number]

[id number]

[id number]





Normal sinus rhythm

Nonspecific T wave abnormality

Abnormal ECG

## 2019-10-21 NOTE — NUR
NURSE NOTES:

Patient in bed ate crackers. patient teaching provided regarding checking blood pressure and 
HR before taking blood pressure medication at home. patient with good understanding. Patient 
on Heparin 4units/kg/hr. Call light within easy reach.

## 2019-10-21 NOTE — NUR
NURSE NOTES:

Pt transfered to the unit, Ox4 calm and cooperative and is aware of what is going on with 
his transfer, Medicare part B not valid?

## 2019-10-21 NOTE — NUR
55 YO MALE FROM HOME TO ER



CC     MIDSTERNAL PAIN RADIATING TO LEFT SHOULDER AND BACK CAUSING SOB



SI:   ACS

T. 98.2 HR 93 RR 18 B/P 183/115

CR 1.7  TROP 4.438

CXR= NEGATIVE 

2D ECHO=EF 60-65%







IS:    MORPHINE IV

ASA PO

HYDRALAZINE IV

******ADMITTED TO ICU*******



IS:   HEPARIN GTT

********ICU STATUS***********





DCP    PENDING HOSPITAL STAY

## 2019-10-21 NOTE — CARDIOLOGY REPORT
--------------- APPROVED REPORT --------------





EXAM: Two-dimensional and M-mode echocardiogram with Doppler and color Doppler.



INDICATION

LV FUNCTION



M-Mode DIMENSIONS 

IVSd1.4 (0.7-1.1cm)Left Atrium (MM)3.1 (1.6-4.0cm)

LVDd4.6 (3.5-5.6cm)Aortic Root3.5 (2.0-3.7cm)

PWd1.3 (0.7-1.1cm)Aortic Cusp Exc.1.8 (1.5-2.0cm)

IVSs1.4 cm

LVDs3.0 (2.5-4.0cm)

PWs1.2 cm





Normal left ventricular chamber size, systolic function and wall motion.

Left ventricular ejection fraction estimated to be  60-65 %.

Mild left ventricular hypertrophy .

No evidence of pericardial effusion. 

All other cardiac chamber sizes are within normal limits. 

Focal aortic valve sclerosis with adequate cusp excursion.

Thickened mitral valve leaflets with normal excursion.

Mitral annulus and aortic root calcification.

Normal pulmonic valve structure. 

Normal tricuspid valve structure. 

IVC at normal size with physiologic collapse.



A  color flow and spectral Doppler study was performed and revealed:

No aortic regurgitation..

Trace mitral regurgitation.

Mitral inflow indicates normal left ventricular diastolic function. 

Trace  tricuspid regurgitation.

Tricuspid  systolic velocities suggests peak right ventricular systolic pressure of  8 

mmHg.

## 2019-10-21 NOTE — NUR
NURSE NOTES:

Received report form Gabriel DUVAL, pt. in bed awake, A/O x's4- able to make needs known, no signs 
or symptoms of acute cardiac or respiratory distress noted, bed alarm on, side rails up x's 
3 and safety brakes engaged- pt. aware to ask for assist when ambulating, pt. appears to be 
resting comfortably in bed watching television- no distress noted,  Pt. appears to be clean 
and dry, RT. wrist 22G IV intact and patent, safety measures continued, will continue with 
plan of care.

## 2019-10-21 NOTE — PROGRESS NOTE
DATE:  10/21/2019

SUBJECTIVE:  The patient is a 54-year-old male who came with possible acute

MI and is currently doing better.  No chest pain.  No palpitation.  He is

in bed.



OBJECTIVE:

VITAL SIGNS:  Blood pressure is 130/70, pulse 60, respirations 18, no

fever.

HEENT:  NAD.

CHEST:  Bilaterally clear.

CARDIOVASCULAR:  Regular rhythm.

ABDOMEN:  Soft.

EXTREMITIES:  ______ CCE.

NEUROLOGICAL:  No focal deficit.



ASSESSMENT:

1. Acute coronary syndrome.

2. Renal failure.

3. Hypertension.

4. CVA.



PLAN:

1.  Continue current treatment.

2. Discussed with  ______, we will transfer him to telemetry.

3. Maximize medical treatment and discharge plan.









  ______________________________________________

  Gunner Lam M.D.





DR:  Isabella

D:  10/21/2019 14:58

T:  10/21/2019 19:57

JOB#:  [id number]

CC:

## 2019-10-21 NOTE — NUR
Social Service Note





EMA spoke with Itzel at [facility] regarding patient transfer for heart 
cath [phone].  Patient is pending financial clearance.  Currently insurance indicates 
Medicare Part A only.  Itzel will contact EMA once chart is reviewed. Additional 
information faxed to [phone]. 

-------------------------------------------------------------------------------

Addendum: 10/21/19 at 1001 by MOIZ CALDWELL

-------------------------------------------------------------------------------

Follow up call placed to Itzel regarding unable to send fax.  Per Itzel patient 
information can be sent with patient.  At this time there is no available time to add 
patient to the Cath schedule until Wednesday.  Itzel will continue to look at schedule 
for possible times.  Will continue to monitor. 

-------------------------------------------------------------------------------

Addendum: 10/21/19 at 1536 by MOIZ CALDWELL

-------------------------------------------------------------------------------

Transfer to [facility] placed on hold.  EMA informed Itzel to release bed 
assignment [facility].  Will follow up as needed.

## 2019-10-21 NOTE — NUR
NURSE NOTES:

Received the patient from SIMIN Ghotra. Patient is awake, alert and oriented x4. currently 
sleeping, opens eyes spontaneously, follows commands. On room air, SpO2 96%. No respiratory 
distress noted. B/P:100/62. Patient SR HR 78 on cardiac monitor. Patient denies any chest 
pain or SOB. Skin intact. Right hand 22G intact, patent and asymptomatic. Instructed patient 
to use call light for assistance. Bed in lowest position, locked, side rails up x2. Call 
light within reach. Will resume plan of care.

## 2019-10-21 NOTE — NUR
NURSE NOTES:

Dr. Crouch called and asked why the patient not transfer to [facility].made 
aware of patient BP and HR low when sleep only, also the troponin level per Dr. Crouch D/C 
Heparin drip. Charge nurse aware.

## 2019-10-21 NOTE — NUR
NURSE NOTES:

Patient in bed sleeping comfortably. denies any pain or discomfort. call light within easy 
reach. No bleeding.

## 2019-10-21 NOTE — CARDIOLOGY REPORT
--------------- APPROVED REPORT --------------





EKG Measurement

Heart [id number]

[id number]

[id number]

[id number]

[id number]





*** Suspect arm lead reversal, interpretation assumes no reversal

Unusual P axis, possible ectopic atrial rhythm

Right ventricular hypertrophy

Anterolateral infarct, age undetermined

Abnormal ECG

## 2019-10-21 NOTE — CARDIAC ELECTROPHYSIOLOGY PN
Assessment/Plan


Assessment/Plan


1. Non-ST-elevation myocardial infarction with peak troponin of 4.9. 


Levels are coming down and patient does not have any chest pain and echo Nl EF


DC heparin drip and continue aspirin, Isordil, Lipitor and metoprolol.  


Awaiting insurance authorization for cardiac catheterization.


Cardiac catheterization at [facility] in May 2017 showed no critical coronary artery 

disease.


Troponin elevation at least partially due to renal failure





2. Hypertension. On metoprolol 25 bid, Lisinopril 5 daily and Isordil 5 mg 

daily 


3. History of CVA and left hemiplegia.


4. Chronic kidney disease with creatinine of 2.0





Transfer out of [facility].


LANG RN and Dr Lam and 





Subjective


Subjective


No CP or SOB.Awaiting transfer to higher level of care for cardiac cath but has 

insurance issues.





Objective





Last 24 Hour Vital Signs








  Date Time  Temp Pulse Resp B/P (MAP) Pulse Ox O2 Delivery O2 Flow Rate FiO2


 


10/21/19 13:00  69 16 119/85 (96) 98   


 


10/21/19 12:19  66      


 


10/21/19 12:00 98.3 63 13 103/64 (77) 98   


 


10/21/19 11:00  68 10 114/73 (87) 97   


 


10/21/19 10:00  72 14 114/79 (91) 99   


 


10/21/19 09:11    131/89    


 


10/21/19 09:10  72  131/89    


 


10/21/19 09:09    131/89    


 


10/21/19 09:00  74 17 131/89 (103) 99   


 


10/21/19 08:00 98.1 65 14 120/86 (97) 99   


 


10/21/19 08:00      Room Air  


 


10/21/19 07:33  72      


 


10/21/19 07:00  74 16 100/62 (75)    


 


10/21/19 06:00  65 14 109/67 (81)    


 


10/21/19 05:00  70 16 96/72 (80)    


 


10/21/19 04:30  59 13 124/77 (93) 96   


 


10/21/19 04:00      Nasal Cannula 2.0 


 


10/21/19 04:00 98.5 70 19 107/82 (90) 98   


 


10/21/19 04:00  80      


 


10/21/19 03:00  67 19 111/88 (96) 98   


 


10/21/19 02:00  66 18 121/87 (98) 99   


 


10/21/19 01:00  71 18 109/65 (80) 99   


 


10/21/19 00:00      Nasal Cannula 2.0 


 


10/21/19 00:00  64      


 


10/21/19 00:00  68 18 105/76 (86) 100   


 


10/20/19 23:30  73 21 113/68 (83) 99   


 


10/20/19 23:15  75 19 112/74 (87) 96   


 


10/20/19 22:30  72 15 89/50 (63) 97   


 


10/20/19 22:15  74 9 99/50 (66) 97   


 


10/20/19 22:00  80 15 84/55 (65) 96   


 


10/20/19 21:49  77  106/56    


 


10/20/19 21:45  82 15 91/46 (61) 97   


 


10/20/19 21:15  77 16 106/56 (73) 99   


 


10/20/19 21:00  74 14 119/66 (83) 100   


 


10/20/19 20:45  77 20 109/68 (82) 98   


 


10/20/19 20:30 98.7 73 16 99/85 (90) 99   


 


10/20/19 20:00  73      


 


10/20/19 20:00      Nasal Cannula 2.0 


 


10/20/19 19:00  80 12 125/76 (92) 100   


 


10/20/19 18:00  73 12 110/65 (80) 100   


 


10/20/19 17:41    127/78    


 


10/20/19 17:00  76 15 113/86 (95) 99   


 


10/20/19 16:00  79      


 


10/20/19 16:00 98.6 75 15 121/89 (100) 100   


 


10/20/19 16:00      Nasal Cannula 2.0 


 


10/20/19 15:00  70 16 130/89 (103) 100   

















Intake and Output  


 


 10/20/19 10/21/19





 19:00 07:00


 


Intake Total 255.14 ml 475.362 ml


 


Output Total 110 ml 


 


Balance 145.14 ml 475.362 ml


 


  


 


Intake Oral 150 ml 350 ml


 


IV Total 105.14 ml 125.362 ml


 


Output Urine Total 110 ml 


 


# Voids 2 1


 


# Bowel Movements 1 











Laboratory Tests








Test


  10/20/19


16:05 10/20/19


20:00 10/21/19


00:10 10/21/19


08:00


 


Activated Partial


Thromboplast Time > 150 SEC


(23-33)  *H 


  > 150 SEC


(23-33)  *H 


 


 


Sodium Level


  139 MMOL/L


(136-145) 


  


  143 MMOL/L


(136-145)


 


Potassium Level


  3.9 MMOL/L


(3.5-5.1) 


  


  4.1 MMOL/L


(3.5-5.1)


 


Chloride Level


  105 MMOL/L


() 


  


  107 MMOL/L


()


 


Carbon Dioxide Level


  28 MMOL/L


(21-32) 


  


  27 MMOL/L


(21-32)


 


Anion Gap


  6 mmol/L


(5-15) 


  


  10 mmol/L


(5-15)


 


Blood Urea Nitrogen


  17 mg/dL


(7-18) 


  


  20 mg/dL


(7-18)  H


 


Creatinine


  2.1 MG/DL


(0.55-1.30)  H 


  


  2.1 MG/DL


(0.55-1.30)  H


 


Estimat Glomerular Filtration


Rate 40.4 mL/min


(>60) 


  


  40.4 mL/min


(>60)


 


Glucose Level


  121 MG/DL


()  H 


  


  99 MG/DL


()


 


Calcium Level


  9.1 MG/DL


(8.5-10.1) 


  


  9.0 MG/DL


(8.5-10.1)


 


Total Bilirubin


  0.8 MG/DL


(0.2-1.0) 


  


  0.6 MG/DL


(0.2-1.0)


 


Aspartate Amino Transf


(AST/SGOT) 33 U/L (15-37)


  


  


  24 U/L (15-37)


 


 


Alanine Aminotransferase


(ALT/SGPT) 19 U/L (12-78)


  


  


  20 U/L (12-78)


 


 


Alkaline Phosphatase


  44 U/L


()  L 


  


  40 U/L


()  L


 


Total Protein


  7.6 G/DL


(6.4-8.2) 


  


  7.0 G/DL


(6.4-8.2)


 


Albumin


  4.0 G/DL


(3.4-5.0) 


  


  3.9 G/DL


(3.4-5.0)


 


Globulin 3.6 g/dL     3.1 g/dL  


 


Albumin/Globulin Ratio 1.1 (1.0-2.7)     1.3 (1.0-2.7)  


 


Troponin I


  


  4.078 ng/mL


(0.000-0.056) 


  


 


 


White Blood Count


  


  


  


  7.0 K/UL


(4.8-10.8)


 


Red Blood Count


  


  


  


  4.66 M/UL


(4.70-6.10)  L


 


Hemoglobin


  


  


  


  14.2 G/DL


(14.2-18.0)


 


Hematocrit


  


  


  


  43.4 %


(42.0-52.0)


 


Mean Corpuscular Volume    93 FL (80-99)  


 


Mean Corpuscular Hemoglobin


  


  


  


  30.5 PG


(27.0-31.0)


 


Mean Corpuscular Hemoglobin


Concent 


  


  


  32.8 G/DL


(32.0-36.0)


 


Red Cell Distribution Width


  


  


  


  12.9 %


(11.6-14.8)


 


Platelet Count


  


  


  


  212 K/UL


(150-450)


 


Mean Platelet Volume


  


  


  


  8.1 FL


(6.5-10.1)


 


Neutrophils (%) (Auto)


  


  


  


  61.0 %


(45.0-75.0)


 


Lymphocytes (%) (Auto)


  


  


  


  23.4 %


(20.0-45.0)


 


Monocytes (%) (Auto)


  


  


  


  8.0 %


(1.0-10.0)


 


Eosinophils (%) (Auto)


  


  


  


  5.9 %


(0.0-3.0)  H


 


Basophils (%) (Auto)


  


  


  


  1.8 %


(0.0-2.0)


 


Pro-B-Type Natriuretic Peptide


  


  


  


  180 pg/mL


(0-125)  H








Objective


HEAD AND NECK:  Showed no JVD or carotid bruits.


LUNGS:  Clear.


CARDIOVASCULAR:  Shows regular S1 and S2 with no gallop or murmur.


 


ABDOMEN:  Soft.


EXTREMITIES:  No pitting edema.











Anirudh Crouch MD Oct 21, 2019 14:47

## 2019-10-21 NOTE — NUR
NURSE NOTES:



Dr. Bynum at bedside assessing pt. updated him on pt's current condition. Pt received bed 
at [facility] ([facility]) [phone], however, transfer is on hold at 
this time, per Dr. Bynum. EMA Dowd aware. Pt resting comfortably, denies pain or SOB.

## 2019-10-21 NOTE — NUR
NURSE NOTES:



Pt observed to be asleep in bed. Still awaiting update from EMA about transfer to [facility]. Will continue to monitor.

## 2019-10-21 NOTE — NUR
TRANSFER TO FLOOR:

Patient transferred to Tele 220-2, per Dr. Crouch.  Report given to SIMIN Rios.  Belongings 
kept with patient, at bedside. Patient reported that he will notify family of transfer 
himself.

## 2019-10-22 VITALS — SYSTOLIC BLOOD PRESSURE: 112 MMHG | DIASTOLIC BLOOD PRESSURE: 72 MMHG

## 2019-10-22 VITALS — DIASTOLIC BLOOD PRESSURE: 85 MMHG | SYSTOLIC BLOOD PRESSURE: 138 MMHG

## 2019-10-22 VITALS — DIASTOLIC BLOOD PRESSURE: 82 MMHG | SYSTOLIC BLOOD PRESSURE: 126 MMHG

## 2019-10-22 VITALS — DIASTOLIC BLOOD PRESSURE: 79 MMHG | SYSTOLIC BLOOD PRESSURE: 117 MMHG

## 2019-10-22 RX ADMIN — METOPROLOL TARTRATE SCH MG: 25 TABLET, FILM COATED ORAL at 09:00

## 2019-10-22 NOTE — NUR
NURSE NOTES: Patient oob with steady gait and denies chest pain / denies any pain. Up to 
toilet and had formed BM and voided with no discomfort.  NSR 73 on tele.

## 2019-10-22 NOTE — CARDIAC ELECTROPHYSIOLOGY PN
Assessment/Plan


Assessment/Plan


1. Non-ST-elevation myocardial infarction with peak troponin of 4.9. 


Levels are coming down and patient does not have any chest pain and Echo Nl EF


Continue aspirin, Isordil, Lipitor and metoprolol.  


Cardiac catheterization at [facility] in May 2017 showed no critical coronary artery 

disease.


Troponin elevation at least partially due to renal failure





2. Hypertension. On metoprolol 25 bid, Lisinopril 5 daily and Isordil 5 mg 

daily 


3. History of CVA and left hemiplegia.


4. Chronic kidney disease with creatinine of 2.0





DW RN and Dr Lam and 





Subjective


Subjective


No CP or SOB. Awaiting insurance issues. Transferred to tele





Objective





Last 24 Hour Vital Signs








  Date Time  Temp Pulse Resp B/P (MAP) Pulse Ox O2 Delivery O2 Flow Rate FiO2


 


10/22/19 09:00  84  130/79    


 


10/22/19 09:00    130/79    


 


10/22/19 09:00    130/79    


 


10/22/19 03:56 97.5 66 16 117/79 (92) 98   


 


10/22/19 03:29  74      


 


10/22/19 00:00 97.5 78 16 126/82 (97) 99   


 


10/21/19 23:29  76      


 


10/21/19 21:00      Room Air  


 


10/21/19 20:09  78  125/75    


 


10/21/19 20:00  86      


 


10/21/19 20:00 98.2 78 16 125/75 (92) 98   


 


10/21/19 18:00  79 13 136/80 (98) 99   





  79      


 


10/21/19 17:45    120/79    


 


10/21/19 17:00  69 14 120/79 (93) 100   





  69      


 


10/21/19 16:00 98.4 72 15 120/78 (92) 97   


 


10/21/19 16:00      Room Air  


 


10/21/19 15:35  67      


 


10/21/19 15:00  67 12 119/71 (87) 97   


 


10/21/19 14:00  71 15 97/72 (80) 98   


 


10/21/19 13:00  69 16 119/85 (96) 98   


 


10/21/19 12:19  66      


 


10/21/19 12:00      Room Air  


 


10/21/19 12:00 98.3 63 13 103/64 (77) 98   

















Intake and Output  


 


 10/21/19 10/22/19





 19:00 07:00


 


Intake Total 900 ml 


 


Output Total 300 ml 


 


Balance 600 ml 


 


  


 


Intake Oral 900 ml 


 


Output Urine Total 300 ml 


 


# Voids 3 1








Objective


HEAD AND NECK:  Showed no JVD or carotid bruits.


LUNGS:  Clear.


CARDIOVASCULAR:  Shows regular S1 and S2 with no gallop or murmur.


 


ABDOMEN:  Soft.


EXTREMITIES:  No pitting edema.











Anirudh Crouch MD Oct 22, 2019 11:17

## 2019-10-22 NOTE — NUR
NURSE NOTES:Received report from SIMIN Headley.  Ptient vss with no sign of cardiac or 
respiratory distress.  AOX4 with calm, cooperative affect.  Bed in lowest, locked position 
and patient agreed to call for help oob to toilet.  No anticoag orders with recent heparin 
drip in ICU. Message left for Dr Lam and Dr Crouch at 850am regarding need for anticoag 
orders.

## 2019-10-22 NOTE — NUR
NURSE NOTES: patient returned from IR PICC placement to Right arm


-------------------------------------------------------------------------------

Addendum: 10/22/19 at 1708 by Andrea Kessler RN

-------------------------------------------------------------------------------

Above note entered in error--disregard. patient is dced

## 2019-10-22 NOTE — NUR
NURSE NOTES: patient discharged per Dr Lam per telephone order.  Reviewed dc forms with 
patient who had no further questions at time of dc.  patient signed belongings 
chart--verified all belongings with patient at time of dc.  Patient ride arrived at 410pm.  
Removed IV and telemetry.  Reviewd meds and f/u appointment with patient. Patient going home 
without services.

## 2019-10-22 NOTE — NUR
NURSE NOTE: Spoke with Dr Crouch about anticoag therapy--ordered SCD , placed on patient.  
Spoke with Anastacia from  about possible transfer to a cath lab facility--no transfer orders 
as of now.  Still no co chest pain.   patient aox4 with calm, cooperative affect. No sign of 
respiratory or cardiac distress.  Call bell and urinal in reach.

## 2019-10-23 NOTE — DISCHARGE SUMMARY
Discharge Summary


Discharge Summary


_


DATE OF ADMISSION: 10/19/2019





DATE OF DISCHARGE: 10/22/2019








DISCHARGED BY: Dr. Lam





REASON FOR ADMISSION: 


54 years old male with past medical history of hypertension, CVA/TIA, spinal 

fusion, presented with  complaint of chest pain with associated shortness of 

breath for 1 day.


Chest pain described as left-sided, pressure-like, nonradiating, 7 out of 10 on 

a scale 1-10.


Patient denied leg swelling.


Patient denied alcohol or drug use.   


Patient reported compliance with his medications.


Upon evaluation blood pressure elevated 183/115.


Laboratory work-up revealed no leukocytosis, stable hemoglobin and hematocrit.


Stable electrolytes.


Glucose 101. 


BUN 12, creatinine 1.7.


Stable LFT.  


EKG revealed normal sinus rhythm, no acute ischemic changes.  Troponin 

negative.  pro .  


Chest x-ray revealed no acute cardiopulmonary pathology.  


Patient subsequently admitted to telemetry floor for further management.


 


CONSULTANTS:


cardiologist Dr. Leon





 


[facility] [place] COU[facility]SE: 


Patient admitted to telemetry floor.  


Second troponin elevated 2.56 , third troponin 4.44.  


Echocardiogram revealed preserved ejection fraction 60 to 65% with no evidence 

of wall motion abnormality.  Mild left ventricular hypertrophy.  


No evidence of pericardial effusion.  


Right ventricular systolic pressure of 8.


Per cardiologist patient had NSTEMI with rising troponin from negative to 4.9.  


Patient started on heparin drip along with antiplatelet therapy with aspirin,  

Isordil and metoprolol.  


Patient  also started on statin.  


Patient will require cardiac catheterization for further evaluation of the 

coronaries, cardiologist.  


Blood pressure was managed with  beta-blocker , lisinopril. and  Isordil .


Patient had a prior catheterization in May 2017 that showed no critical 

coronary artery disease.  





Chest pain was addressed, and pain was controlled.


Patient subsequent denied any  chest pain.  


Troponin level started to come down.


Patient wanted to go home and follow-up with   cardiologist as outpatient.  

Last troponin 4.07.  


Per cardiologist elevated troponin   at least partially was due to renal 

failure.  


Patient was advised to follow-up with cardiology as an outpatient for 

outpatient ischemia evaluation.





Renal parameters and electrolytes were closely monitored,  electrolytes 

corrected as needed and nephrotoxins were avoided. 


Creatinine trended up from  1.7 up to 2.1.  








Patient remained chest pain-free, no shortness of breath.  


Pulse oximetry stable on room air prior to the


Patient was discharged home.





FINAL DIAGNOSES: 


NSTEMI


Hypertension


History of CVA with   left hemiplegia


Chronic kidney disease





DISCHARGE MEDICATIONS:


See Medication Reconciliation list.





DISCHARGE INSTRUCTIONS:


Patient was discharged home .


Follow up with primary care provider in one week.





I have been assigned to dictate discharge summary for this account. 


I was not involved in the patient's management.











Leticia Romero NP Oct 23, 2019 11:43

## 2020-01-29 ENCOUNTER — HOSPITAL ENCOUNTER (EMERGENCY)
Dept: HOSPITAL 72 - EMR | Age: 55
Discharge: HOME | End: 2020-01-29
Payer: SELF-PAY

## 2020-01-29 VITALS — HEIGHT: 66 IN | BODY MASS INDEX: 29.73 KG/M2 | WEIGHT: 185 LBS

## 2020-01-29 VITALS — DIASTOLIC BLOOD PRESSURE: 68 MMHG | SYSTOLIC BLOOD PRESSURE: 131 MMHG

## 2020-01-29 VITALS — SYSTOLIC BLOOD PRESSURE: 109 MMHG | DIASTOLIC BLOOD PRESSURE: 76 MMHG

## 2020-01-29 DIAGNOSIS — G62.9: ICD-10-CM

## 2020-01-29 DIAGNOSIS — G81.94: ICD-10-CM

## 2020-01-29 DIAGNOSIS — Z98.1: ICD-10-CM

## 2020-01-29 DIAGNOSIS — I10: ICD-10-CM

## 2020-01-29 DIAGNOSIS — R07.9: Primary | ICD-10-CM

## 2020-01-29 DIAGNOSIS — Z88.8: ICD-10-CM

## 2020-01-29 LAB
ADD MANUAL DIFF: NO
ALBUMIN SERPL-MCNC: 3.9 G/DL (ref 3.4–5)
ALBUMIN/GLOB SERPL: 1.1 {RATIO} (ref 1–2.7)
ALP SERPL-CCNC: 50 U/L (ref 46–116)
ALT SERPL-CCNC: 16 U/L (ref 12–78)
ANION GAP SERPL CALC-SCNC: 13 MMOL/L (ref 5–15)
AST SERPL-CCNC: 25 U/L (ref 15–37)
BASOPHILS NFR BLD AUTO: 1.3 % (ref 0–2)
BILIRUB SERPL-MCNC: 0.3 MG/DL (ref 0.2–1)
BUN SERPL-MCNC: 16 MG/DL (ref 7–18)
CALCIUM SERPL-MCNC: 8.3 MG/DL (ref 8.5–10.1)
CHLORIDE SERPL-SCNC: 107 MMOL/L (ref 98–107)
CO2 SERPL-SCNC: 23 MMOL/L (ref 21–32)
CREAT SERPL-MCNC: 2.1 MG/DL (ref 0.55–1.3)
EOSINOPHIL NFR BLD AUTO: 10 % (ref 0–3)
ERYTHROCYTE [DISTWIDTH] IN BLOOD BY AUTOMATED COUNT: 13.3 % (ref 11.6–14.8)
GLOBULIN SER-MCNC: 3.5 G/DL
HCT VFR BLD CALC: 40.9 % (ref 42–52)
HGB BLD-MCNC: 13.6 G/DL (ref 14.2–18)
LYMPHOCYTES NFR BLD AUTO: 25.3 % (ref 20–45)
MCV RBC AUTO: 94 FL (ref 80–99)
MONOCYTES NFR BLD AUTO: 6.3 % (ref 1–10)
NEUTROPHILS NFR BLD AUTO: 57.1 % (ref 45–75)
PLATELET # BLD: 178 K/UL (ref 150–450)
POTASSIUM SERPL-SCNC: 4.6 MMOL/L (ref 3.5–5.1)
RBC # BLD AUTO: 4.37 M/UL (ref 4.7–6.1)
SODIUM SERPL-SCNC: 143 MMOL/L (ref 136–145)
WBC # BLD AUTO: 7.8 K/UL (ref 4.8–10.8)

## 2020-01-29 PROCEDURE — 96374 THER/PROPH/DIAG INJ IV PUSH: CPT

## 2020-01-29 PROCEDURE — 99284 EMERGENCY DEPT VISIT MOD MDM: CPT

## 2020-01-29 PROCEDURE — 80307 DRUG TEST PRSMV CHEM ANLYZR: CPT

## 2020-01-29 PROCEDURE — 84484 ASSAY OF TROPONIN QUANT: CPT

## 2020-01-29 PROCEDURE — 85025 COMPLETE CBC W/AUTO DIFF WBC: CPT

## 2020-01-29 PROCEDURE — 80053 COMPREHEN METABOLIC PANEL: CPT

## 2020-01-29 PROCEDURE — 96375 TX/PRO/DX INJ NEW DRUG ADDON: CPT

## 2020-01-29 PROCEDURE — 83690 ASSAY OF LIPASE: CPT

## 2020-01-29 PROCEDURE — 71045 X-RAY EXAM CHEST 1 VIEW: CPT

## 2020-01-29 PROCEDURE — 83880 ASSAY OF NATRIURETIC PEPTIDE: CPT

## 2020-01-29 PROCEDURE — 36415 COLL VENOUS BLD VENIPUNCTURE: CPT

## 2020-01-29 NOTE — NUR
ED Nurse Note:

Patient walked into ED from home c/o substernal chest pain that started all of 
a sudden 3 hours ago, radiating to his entire back, and left chest. patient is 
alert awake x4 ambulatory, breathing unlabored and even, speaking in full 
sentences. patient on a hospital gown, on a cardiac monitor.

## 2020-01-29 NOTE — EMERGENCY ROOM REPORT
History of Present Illness


General


Chief Complaint:  Chest Pain


Source:  Patient





Present Illness


HPI


Patient presents with complaints of right upper lateral chest pain


Patient reports that earlier this afternoon while he was reaching across his 

body with his right arm felt a sharp pain in the right upper chest area there 

is some radiation towards the back and presents to the ER





Denies any vomiting or diarrhea denies any fall or trauma denies any heaviness 

denies any dyspnea


Allergies:  


Coded Allergies:  


     KETOROLAC (Unverified  Adverse Reaction, Intermediate, n/v, 4/7/19)


Uncoded Allergies:  


     TORODOL (Allergy, Unknown, 10/21/16)





Patient History


Past Medical History:  see triage record


Reviewed Nursing Documentation:  PMH: Agreed; PSxH: Agreed





Nursing Documentation-PMH


Past Medical History:  No History, Except For


Hx Hypertension:  Yes


Hx Cancer:  No


Hx Gastrointestinal Problems:  Yes


Hx Neurological Problems:  Yes - Spinal Fusion and stroke 2011(Lt. side deficit)

, Neuralgia & neuritis


Hx Cerebrovascular Accident:  Yes - CVA w/ Lt side weaknedss 2011,2017


Hx Transient Ischemic Attacks:  Yes


Hx Peripheral Neuropathy:  Yes


Hx Neurologic Surgery:  Yes - Craniotomy at [facility]/ Intracranial hemorrage





Review of Systems


All Other Systems:  negative except mentioned in HPI





Physical Exam





Vital Signs








  Date Time  Temp Pulse Resp B/P (MAP) Pulse Ox O2 Delivery O2 Flow Rate FiO2


 


1/29/20 18:17 98.2 102 16 130/72 (91) 95 Room Air  








Sp02 EP Interpretation:  reviewed, normal


General Appearance:  well appearing, no apparent distress


Head:  normocephalic, atraumatic


Eyes:  bilateral eye PERRL, bilateral eye EOMI


ENT:  hearing grossly normal, normal pharynx, TMs + canals normal, uvula midline


Neck:  full range of motion, supple, no meningismus, no bony tend


Respiratory:  lungs clear, normal breath sounds, no rhonchi, no respiratory 

distress, no retraction, no accessory muscle use


Cardiovascular #1:  normal peripheral pulses, regular rate, rhythm, no edema, 

no gallop, no JVD, no murmur


Gastrointestinal:  normal bowel sounds, non tender, soft, no mass, no 

organomegaly, non-distended, no guarding, no hernia, no pulsatile mass, no 

rebound


Genitourinary:  no CVA tenderness


Musculoskeletal:  normal inspection


Neurologic:  motor strength/tone normal, CNs III-XII nml as tested, oriented x3

, sensory intact, responsive


Psychiatric:  mood/affect normal


Skin:  no rash


Lymphatic:  normal inspection, no adenopathy





Medical Decision Making


Diagnostic Impression:  


 Primary Impression:  


 Chest pain


ER Course


Patient is a fairly complex patient with multiple differential to consideration 

including but not limited to cardiac cardiopulmonary and vascular emergencies





Patient's blood work including troponin are negative contact was made to the 

patient's primary physician who reports patient has had several


Visits to other hospitals and emergency rooms with negative cardiac work-up and 

at this time patient is stable for close outpatient follow-up





Labs








Test


  1/29/20


18:47 1/29/20


19:10


 


White Blood Count


  7.8 K/UL


(4.8-10.8) 


 


 


Red Blood Count


  4.37 M/UL


(4.70-6.10) 


 


 


Hemoglobin


  13.6 G/DL


(14.2-18.0) 


 


 


Hematocrit


  40.9 %


(42.0-52.0) 


 


 


Mean Corpuscular Volume 94 FL (80-99)  


 


Mean Corpuscular Hemoglobin


  31.2 PG


(27.0-31.0) 


 


 


Mean Corpuscular Hemoglobin


Concent 33.4 G/DL


(32.0-36.0) 


 


 


Red Cell Distribution Width


  13.3 %


(11.6-14.8) 


 


 


Platelet Count


  178 K/UL


(150-450) 


 


 


Mean Platelet Volume


  9.4 FL


(6.5-10.1) 


 


 


Neutrophils (%) (Auto)


  57.1 %


(45.0-75.0) 


 


 


Lymphocytes (%) (Auto)


  25.3 %


(20.0-45.0) 


 


 


Monocytes (%) (Auto)


  6.3 %


(1.0-10.0) 


 


 


Eosinophils (%) (Auto)


  10.0 %


(0.0-3.0) 


 


 


Basophils (%) (Auto)


  1.3 %


(0.0-2.0) 


 


 


Sodium Level


  143 MMOL/L


(136-145) 


 


 


Potassium Level


  4.6 MMOL/L


(3.5-5.1) 


 


 


Chloride Level


  107 MMOL/L


() 


 


 


Carbon Dioxide Level


  23 MMOL/L


(21-32) 


 


 


Anion Gap


  13 mmol/L


(5-15) 


 


 


Blood Urea Nitrogen


  16 mg/dL


(7-18) 


 


 


Creatinine


  2.1 MG/DL


(0.55-1.30) 


 


 


Estimat Glomerular Filtration


Rate 40.4 mL/min


(>60) 


 


 


Glucose Level


  135 MG/DL


() 


 


 


Calcium Level


  8.3 MG/DL


(8.5-10.1) 


 


 


Total Bilirubin


  0.3 MG/DL


(0.2-1.0) 


 


 


Aspartate Amino Transf


(AST/SGOT) 25 U/L (15-37) 


  


 


 


Alanine Aminotransferase


(ALT/SGPT) 16 U/L (12-78) 


  


 


 


Alkaline Phosphatase


  50 U/L


() 


 


 


Troponin I


  0.000 ng/mL


(0.000-0.056) 


 


 


Pro-B-Type Natriuretic Peptide


  116 pg/mL


(0-125) 


 


 


Total Protein


  7.4 G/DL


(6.4-8.2) 


 


 


Albumin


  3.9 G/DL


(3.4-5.0) 


 


 


Globulin 3.5 g/dL  


 


Albumin/Globulin Ratio 1.1 (1.0-2.7)  


 


Lipase


  150 U/L


() 


 


 


Urine Opiates Screen


  


  Negative


(NEGATIVE)


 


Urine Barbiturates Screen


  


  Negative


(NEGATIVE)


 


Phencyclidine (PCP) Screen


  


  Negative


(NEGATIVE)


 


Urine Amphetamines Screen


  


  Negative


(NEGATIVE)


 


Urine Benzodiazepines Screen


  


  Negative


(NEGATIVE)


 


Urine Cocaine Screen


  


  Negative


(NEGATIVE)


 


Urine Marijuana (THC) Screen


  


  Positive


(NEGATIVE)








EKG Diagnostic Results


Rate:  normal


Rhythm:  NSR


ST Segments:  other - Specific ST and T wave changes





Rhythm Strip Diag. Results


EP Interpretation:  yes


Rate:  78


Rhythm:  NSR, no PVC's, no ectopy





Chest X-Ray Diagnostic Results


Chest X-Ray Diagnostic Results :  


   Chest X-Ray Ordered:  Yes


   # of Views/Limited/Complete:  1 View


   Indication:  Chest Pain


   EP Interpretation:  Yes


   Interpretation:  no consolidation, no effusion, no pneumothorax


   Impression:  No acute disease


   Electronically Signed by:  Viral Yates DO





Last Vital Signs








  Date Time  Temp Pulse Resp B/P (MAP) Pulse Ox O2 Delivery O2 Flow Rate FiO2


 


1/29/20 18:53  98 19   Room Air  


 


1/29/20 18:17 98.2   130/72 (91) 95   








Status:  improved


Disposition:  HOME, SELF-CARE


Condition:  Improved


Scripts


Acetaminophen (Tylenol) 325 Mg Tablet


650 MG ORAL Q12HR PRN for Prn Pain/Headache/Temp > 101, #20 TAB 0 Refills


   Prov: Viral Yates DO         1/29/20





Additional Instructions:  


Patient is provided with the discharge instructions notified to follow up with 

primary doctor in the next 2-3 days otherwise return to the er with any 

worsening symptoms.


Please note that this report is being documented using DRAGON technology.  This 

can lead to erroneous entry secondary to incorrect interpretation by the 

dictating instrument.











Viral Yates DO Jan 29, 2020 19:04

## 2020-01-29 NOTE — NUR
ED Nurse Note:



received report from Yoli DUVAL. Pt roland, SUE. will continue to monitor patient.

## 2020-01-30 NOTE — DIAGNOSTIC IMAGING REPORT
Indication: Chest pain

 

Technique: One view of the chest

 

Comparison: 10/19/2019

 

Findings: Low lung volumes. Lungs pleural spaces are clear. Heart size is normal.

There is cervical spine fusion hardware incidentally noted No significant interim

change

 

Impression: No acute process

## 2020-03-02 ENCOUNTER — HOSPITAL ENCOUNTER (INPATIENT)
Dept: HOSPITAL 72 - EMR | Age: 55
LOS: 3 days | Discharge: HOME | DRG: 313 | End: 2020-03-05
Payer: MEDICARE

## 2020-03-02 VITALS — DIASTOLIC BLOOD PRESSURE: 92 MMHG | SYSTOLIC BLOOD PRESSURE: 160 MMHG

## 2020-03-02 VITALS — WEIGHT: 191.25 LBS | BODY MASS INDEX: 30.74 KG/M2 | HEIGHT: 66 IN

## 2020-03-02 VITALS — SYSTOLIC BLOOD PRESSURE: 155 MMHG | DIASTOLIC BLOOD PRESSURE: 96 MMHG

## 2020-03-02 VITALS — SYSTOLIC BLOOD PRESSURE: 163 MMHG | DIASTOLIC BLOOD PRESSURE: 106 MMHG

## 2020-03-02 DIAGNOSIS — I69.359: ICD-10-CM

## 2020-03-02 DIAGNOSIS — N18.3: ICD-10-CM

## 2020-03-02 DIAGNOSIS — I12.9: ICD-10-CM

## 2020-03-02 DIAGNOSIS — Z79.82: ICD-10-CM

## 2020-03-02 DIAGNOSIS — Z98.1: ICD-10-CM

## 2020-03-02 DIAGNOSIS — I16.0: ICD-10-CM

## 2020-03-02 DIAGNOSIS — N17.9: ICD-10-CM

## 2020-03-02 DIAGNOSIS — N28.1: ICD-10-CM

## 2020-03-02 DIAGNOSIS — R07.9: Primary | ICD-10-CM

## 2020-03-02 DIAGNOSIS — Z88.8: ICD-10-CM

## 2020-03-02 DIAGNOSIS — M10.9: ICD-10-CM

## 2020-03-02 LAB
ADD MANUAL DIFF: NO
ALBUMIN SERPL-MCNC: 4.4 G/DL (ref 3.4–5)
ALBUMIN/GLOB SERPL: 1.4 {RATIO} (ref 1–2.7)
ALP SERPL-CCNC: 48 U/L (ref 46–116)
ALT SERPL-CCNC: 20 U/L (ref 12–78)
ANION GAP SERPL CALC-SCNC: 17 MMOL/L (ref 5–15)
APTT BLD: 28 SEC (ref 23–33)
AST SERPL-CCNC: 15 U/L (ref 15–37)
BASOPHILS NFR BLD AUTO: 2.2 % (ref 0–2)
BILIRUB SERPL-MCNC: 0.5 MG/DL (ref 0.2–1)
BUN SERPL-MCNC: 12 MG/DL (ref 7–18)
CALCIUM SERPL-MCNC: 9.4 MG/DL (ref 8.5–10.1)
CHLORIDE SERPL-SCNC: 106 MMOL/L (ref 98–107)
CO2 SERPL-SCNC: 21 MMOL/L (ref 21–32)
CREAT SERPL-MCNC: 1.7 MG/DL (ref 0.55–1.3)
EOSINOPHIL NFR BLD AUTO: 9.5 % (ref 0–3)
ERYTHROCYTE [DISTWIDTH] IN BLOOD BY AUTOMATED COUNT: 14.6 % (ref 11.6–14.8)
GLOBULIN SER-MCNC: 3.2 G/DL
HCT VFR BLD CALC: 45.9 % (ref 42–52)
HGB BLD-MCNC: 14.4 G/DL (ref 14.2–18)
INR PPP: 1 (ref 0.9–1.1)
LYMPHOCYTES NFR BLD AUTO: 25.3 % (ref 20–45)
MCV RBC AUTO: 95 FL (ref 80–99)
MONOCYTES NFR BLD AUTO: 7 % (ref 1–10)
NEUTROPHILS NFR BLD AUTO: 56 % (ref 45–75)
PLATELET # BLD: 200 K/UL (ref 150–450)
POTASSIUM SERPL-SCNC: 4 MMOL/L (ref 3.5–5.1)
RBC # BLD AUTO: 4.84 M/UL (ref 4.7–6.1)
SODIUM SERPL-SCNC: 144 MMOL/L (ref 136–145)
WBC # BLD AUTO: 8 K/UL (ref 4.8–10.8)

## 2020-03-02 PROCEDURE — 81001 URINALYSIS AUTO W/SCOPE: CPT

## 2020-03-02 PROCEDURE — 93306 TTE W/DOPPLER COMPLETE: CPT

## 2020-03-02 PROCEDURE — 76770 US EXAM ABDO BACK WALL COMP: CPT

## 2020-03-02 PROCEDURE — 83880 ASSAY OF NATRIURETIC PEPTIDE: CPT

## 2020-03-02 PROCEDURE — 96374 THER/PROPH/DIAG INJ IV PUSH: CPT

## 2020-03-02 PROCEDURE — 80307 DRUG TEST PRSMV CHEM ANLYZR: CPT

## 2020-03-02 PROCEDURE — 96361 HYDRATE IV INFUSION ADD-ON: CPT

## 2020-03-02 PROCEDURE — 83690 ASSAY OF LIPASE: CPT

## 2020-03-02 PROCEDURE — 99285 EMERGENCY DEPT VISIT HI MDM: CPT

## 2020-03-02 PROCEDURE — 85730 THROMBOPLASTIN TIME PARTIAL: CPT

## 2020-03-02 PROCEDURE — 36415 COLL VENOUS BLD VENIPUNCTURE: CPT

## 2020-03-02 PROCEDURE — 82570 ASSAY OF URINE CREATININE: CPT

## 2020-03-02 PROCEDURE — 84550 ASSAY OF BLOOD/URIC ACID: CPT

## 2020-03-02 PROCEDURE — 82044 UR ALBUMIN SEMIQUANTITATIVE: CPT

## 2020-03-02 PROCEDURE — 85610 PROTHROMBIN TIME: CPT

## 2020-03-02 PROCEDURE — 80048 BASIC METABOLIC PNL TOTAL CA: CPT

## 2020-03-02 PROCEDURE — 93017 CV STRESS TEST TRACING ONLY: CPT

## 2020-03-02 PROCEDURE — 93350 STRESS TTE ONLY: CPT

## 2020-03-02 PROCEDURE — 71045 X-RAY EXAM CHEST 1 VIEW: CPT

## 2020-03-02 PROCEDURE — 80053 COMPREHEN METABOLIC PANEL: CPT

## 2020-03-02 PROCEDURE — 84484 ASSAY OF TROPONIN QUANT: CPT

## 2020-03-02 PROCEDURE — 93005 ELECTROCARDIOGRAM TRACING: CPT

## 2020-03-02 PROCEDURE — 85025 COMPLETE CBC W/AUTO DIFF WBC: CPT

## 2020-03-02 NOTE — EMERGENCY ROOM REPORT
History of Present Illness


General


Chief Complaint:  Chest pain


Source:  Patient





Present Illness


HPI


54-year-old male history of stroke history of spinal fusion history of 

hypertension history of craniotomy history of tracheostomy presents with chest 

pain that started at 5 PM aggravated with exertion alleviated with rest 

severity is moderate, constant, feels a sharp pain patient also endorses some 

shortness of breath patient presents for evaluation


Allergies:  


Coded Allergies:  


     KETOROLAC (Unverified  Adverse Reaction, Intermediate, n/v, 4/7/19)


Uncoded Allergies:  


     TORODOL (Allergy, Unknown, 10/21/16)





Patient History


Past Medical History:  see triage record


Reviewed Nursing Documentation:  PMH: Agreed; PSxH: Agreed





Nursing Documentation-PMH


Hx Hypertension:  Yes


Hx Cancer:  No


Hx Gastrointestinal Problems:  Yes


Hx Neurological Problems:  Yes - Spinal Fusion and stroke 2011(Lt. side deficit)

, Neuralgia & neuritis


Hx Cerebrovascular Accident:  Yes - CVA w/ Lt side weaknedss 2011,2017


Hx Transient Ischemic Attacks:  Yes


Hx Peripheral Neuropathy:  Yes


Hx Neurologic Surgery:  Yes - Craniotomy at [facility]/ Intracranial hemorrage





Review of Systems


All Other Systems:  negative except mentioned in HPI





Physical Exam


Sp02 EP Interpretation:  reviewed, normal


General Appearance:  well appearing, no apparent distress, alert


Head:  normocephalic, atraumatic


Eyes:  bilateral eye PERRL, bilateral eye EOMI


ENT:  uvula midline, moist mucus membranes


Neck:  supple, thyroid normal, supple/symm/no masses


Respiratory:  lungs clear, no respiratory distress, no retraction, no accessory 

muscle use


Cardiovascular #1:  normal peripheral pulses, regular rate, rhythm, no edema, 

no gallop, no murmur


Gastrointestinal:  non tender, soft, no guarding, no rebound


Musculoskeletal:  normal inspection


Neurologic:  alert, oriented x3


Psychiatric:  mood/affect normal


Skin:  no rash, warm/dry





Medical Decision Making


Diagnostic Impression:  


 Primary Impression:  


 Chest pain


 Qualified Codes:  R07.9 - Chest pain, unspecified


ER Course


54-year-old male presents with chest pain concerning for possible ACS 

differential diagnosis includes pneumonia, dissection


Patient most likely with ACS will provide patient with aspirin, nitro 


Patient admitted to [facility].





Laboratory Tests








Test


  3/2/20


21:25


 


White Blood Count


  8.0 K/UL


(4.8-10.8)


 


Red Blood Count


  4.84 M/UL


(4.70-6.10)


 


Hemoglobin


  14.4 G/DL


(14.2-18.0)


 


Hematocrit


  45.9 %


(42.0-52.0)


 


Mean Corpuscular Volume 95 FL (80-99)  


 


Mean Corpuscular Hemoglobin


  29.8 PG


(27.0-31.0)


 


Mean Corpuscular Hemoglobin


Concent 31.4 G/DL


(32.0-36.0)  L


 


Red Cell Distribution Width


  14.6 %


(11.6-14.8)


 


Platelet Count


  200 K/UL


(150-450)


 


Mean Platelet Volume


  9.6 FL


(6.5-10.1)


 


Neutrophils (%) (Auto)


  56.0 %


(45.0-75.0)


 


Lymphocytes (%) (Auto)


  25.3 %


(20.0-45.0)


 


Monocytes (%) (Auto)


  7.0 %


(1.0-10.0)


 


Eosinophils (%) (Auto)


  9.5 %


(0.0-3.0)  H


 


Basophils (%) (Auto)


  2.2 %


(0.0-2.0)  H


 


Prothrombin Time


  10.7 SEC


(9.30-11.50)


 


Prothrombin Time INR 1.0 (0.9-1.1)  


 


Activated Partial


Thromboplast Time 28 SEC (23-33)


 


 


Sodium Level


  144 MMOL/L


(136-145)


 


Potassium Level


  4.0 MMOL/L


(3.5-5.1)


 


Chloride Level


  106 MMOL/L


()


 


Carbon Dioxide Level


  21 MMOL/L


(21-32)


 


Anion Gap


  17 mmol/L


(5-15)  H


 


Blood Urea Nitrogen


  12 mg/dL


(7-18)


 


Creatinine


  1.7 MG/DL


(0.55-1.30)  H


 


Estimate Glomerular


Filtration Rate 51.5 mL/min


(>60)


 


Glucose Level


  89 MG/DL


()


 


Calcium Level


  9.4 MG/DL


(8.5-10.1)


 


Total Bilirubin


  0.5 MG/DL


(0.2-1.0)


 


Aspartate Amino Transferase


(AST) 15 U/L (15-37)


 


 


Alanine Aminotransferase (ALT)


  20 U/L (12-78)


 


 


Alkaline Phosphatase


  48 U/L


()


 


Troponin I


  0.012 ng/mL


(0.000-0.056)


 


Pro-B-Type Natriuretic Peptide


  143 pg/mL


(0-125)  H


 


Total Protein


  7.6 G/DL


(6.4-8.2)


 


Albumin


  4.4 G/DL


(3.4-5.0)


 


Globulin 3.2 g/dL  


 


Albumin/Globulin Ratio 1.4 (1.0-2.7)  


 


Lipase


  223 U/L


()








EKG Diagnostic Results


EKG Time:  21:16


EP Interpretation:  NSR, rate 66, QTc 438, no acute ST elevations, left axis 

deviation





Rhythm Strip Diag. Results


Rhythm Strip Time:  21:19


EP Interpretation:  yes


Rate:  85


Rhythm:  NSR, no PVC's, no ectopy





Chest X-Ray Diagnostic Results


Chest X-Ray Diagnostic Results :  


   Chest X-Ray Ordered:  Yes


   # of Views/Limited/Complete:  1 View


   Indication:  Chest Pain


   EP Interpretation:  Yes


   Interpretation:  no consolidation, no effusion, no pneumothorax, no acute 

cardiopulmonary disease


   Impression:  No acute disease


   Electronically Signed by:  Robert Rodney MD


Disposition:  ADMITTED AS INPATIENT


Condition:  Stable











Robert Rodney MD Mar 2, 2020 21:20

## 2020-03-03 VITALS — DIASTOLIC BLOOD PRESSURE: 93 MMHG | SYSTOLIC BLOOD PRESSURE: 141 MMHG

## 2020-03-03 VITALS — DIASTOLIC BLOOD PRESSURE: 70 MMHG | SYSTOLIC BLOOD PRESSURE: 138 MMHG

## 2020-03-03 VITALS — DIASTOLIC BLOOD PRESSURE: 86 MMHG | SYSTOLIC BLOOD PRESSURE: 140 MMHG

## 2020-03-03 VITALS — DIASTOLIC BLOOD PRESSURE: 74 MMHG | SYSTOLIC BLOOD PRESSURE: 122 MMHG

## 2020-03-03 VITALS — SYSTOLIC BLOOD PRESSURE: 147 MMHG | DIASTOLIC BLOOD PRESSURE: 84 MMHG

## 2020-03-03 VITALS — SYSTOLIC BLOOD PRESSURE: 144 MMHG | DIASTOLIC BLOOD PRESSURE: 91 MMHG

## 2020-03-03 LAB
ADD MANUAL DIFF: NO
ANION GAP SERPL CALC-SCNC: 14 MMOL/L (ref 5–15)
APPEARANCE UR: CLEAR
APTT PPP: YELLOW S
BASOPHILS NFR BLD AUTO: 1.1 % (ref 0–2)
BUN SERPL-MCNC: 9 MG/DL (ref 7–18)
CALCIUM SERPL-MCNC: 8.6 MG/DL (ref 8.5–10.1)
CHLORIDE SERPL-SCNC: 107 MMOL/L (ref 98–107)
CO2 SERPL-SCNC: 22 MMOL/L (ref 21–32)
CREAT SERPL-MCNC: 1.6 MG/DL (ref 0.55–1.3)
EOSINOPHIL NFR BLD AUTO: 11.1 % (ref 0–3)
ERYTHROCYTE [DISTWIDTH] IN BLOOD BY AUTOMATED COUNT: 13.8 % (ref 11.6–14.8)
GLUCOSE UR STRIP-MCNC: NEGATIVE MG/DL
HCT VFR BLD CALC: 42.1 % (ref 42–52)
HGB BLD-MCNC: 14.1 G/DL (ref 14.2–18)
KETONES UR QL STRIP: NEGATIVE
LEUKOCYTE ESTERASE UR QL STRIP: NEGATIVE
LYMPHOCYTES NFR BLD AUTO: 32.1 % (ref 20–45)
MCV RBC AUTO: 93 FL (ref 80–99)
MONOCYTES NFR BLD AUTO: 7.1 % (ref 1–10)
NEUTROPHILS NFR BLD AUTO: 48.6 % (ref 45–75)
NITRITE UR QL STRIP: NEGATIVE
PH UR STRIP: 6 [PH] (ref 4.5–8)
PLATELET # BLD: 176 K/UL (ref 150–450)
POTASSIUM SERPL-SCNC: 3.4 MMOL/L (ref 3.5–5.1)
PROT UR QL STRIP: NEGATIVE
RBC # BLD AUTO: 4.51 M/UL (ref 4.7–6.1)
SODIUM SERPL-SCNC: 142 MMOL/L (ref 136–145)
SP GR UR STRIP: 1.02 (ref 1–1.03)
UROBILINOGEN UR-MCNC: NORMAL MG/DL (ref 0–1)
WBC # BLD AUTO: 6.7 K/UL (ref 4.8–10.8)

## 2020-03-03 RX ADMIN — DOCUSATE SODIUM SCH MG: 100 CAPSULE, LIQUID FILLED ORAL at 20:21

## 2020-03-03 RX ADMIN — ASPIRIN 81 MG SCH MG: 81 TABLET ORAL at 08:50

## 2020-03-03 RX ADMIN — DIPHENHYDRAMINE HYDROCHLORIDE PRN MG: 50 INJECTION INTRAMUSCULAR; INTRAVENOUS at 20:21

## 2020-03-03 RX ADMIN — DIPHENHYDRAMINE HYDROCHLORIDE PRN MG: 50 INJECTION INTRAMUSCULAR; INTRAVENOUS at 06:11

## 2020-03-03 RX ADMIN — DIPHENHYDRAMINE HYDROCHLORIDE PRN MG: 50 INJECTION INTRAMUSCULAR; INTRAVENOUS at 01:00

## 2020-03-03 RX ADMIN — ALLOPURINOL SCH MG: 100 TABLET ORAL at 08:49

## 2020-03-03 RX ADMIN — DIPHENHYDRAMINE HYDROCHLORIDE PRN MG: 50 INJECTION INTRAMUSCULAR; INTRAVENOUS at 10:15

## 2020-03-03 RX ADMIN — DIPHENHYDRAMINE HYDROCHLORIDE PRN MG: 50 INJECTION INTRAMUSCULAR; INTRAVENOUS at 14:25

## 2020-03-03 RX ADMIN — HEPARIN SODIUM SCH UNITS: 5000 INJECTION INTRAVENOUS; SUBCUTANEOUS at 20:23

## 2020-03-03 RX ADMIN — HEPARIN SODIUM SCH UNITS: 5000 INJECTION INTRAVENOUS; SUBCUTANEOUS at 08:51

## 2020-03-03 RX ADMIN — DOCUSATE SODIUM SCH MG: 100 CAPSULE, LIQUID FILLED ORAL at 08:49

## 2020-03-03 RX ADMIN — ISOSORBIDE MONONITRATE SCH MG: 30 TABLET, EXTENDED RELEASE ORAL at 08:49

## 2020-03-03 NOTE — CARDIAC ELECTROPHYSIOLOGY PN
Subjective


Subjective


CP. No MI. Cardiac cath at [facility] no CAD in 2017


Will get stat Echo and stress test


[id number]





Objective





Last 24 Hour Vital Signs








  Date Time  Temp Pulse Resp B/P (MAP) Pulse Ox O2 Delivery O2 Flow Rate FiO2


 


3/3/20 09:00      Room Air  


 


3/3/20 08:50  64  147/84    


 


3/3/20 08:49    147/84    


 


3/3/20 08:00  75      


 


3/3/20 08:00 97.5 64 18 147/84 (105) 98   


 


3/3/20 06:41 97.2       


 


3/3/20 04:00  70      


 


3/3/20 04:00 97.2 66 18 144/91 (108) 97   


 


3/3/20 01:54      Room Air  


 


3/3/20 01:14      Room Air  


 


3/3/20 00:00  80      


 


3/3/20 00:00 97.0 75 16 141/93 (109) 98   


 


3/2/20 23:45 97.2 82 17 160/92 100 Room Air  


 


3/2/20 23:45 97.2 82 17 160/92 100 Room Air  


 


3/2/20 23:43    180/100    


 


3/2/20 22:31 97.2       


 


3/2/20 22:10 97.2 78 16 155/96 100 Room Air  


 


3/2/20 21:30    160/95    


 


3/2/20 21:25 97.2 80 17 163/106 100 Room Air  


 


3/2/20 21:25  66 17   Room Air  


 


3/2/20 21:15 97.2 66 17 163/106 (125) 100 Room Air  

















Intake and Output  


 


 3/2/20 3/3/20





 19:00 07:00


 


Intake Total  1000 ml


 


Balance  1000 ml


 


  


 


Intake IV Total  1000 ml


 


# Voids  1


 


# Bowel Movements  2











Laboratory Tests








Test


  3/2/20


21:25 3/2/20


23:30 3/3/20


04:00


 


White Blood Count


  8.0 K/UL


(4.8-10.8) 


  6.7 K/UL


(4.8-10.8)


 


Red Blood Count


  4.84 M/UL


(4.70-6.10) 


  4.51 M/UL


(4.70-6.10)  L


 


Hemoglobin


  14.4 G/DL


(14.2-18.0) 


  14.1 G/DL


(14.2-18.0)  L


 


Hematocrit


  45.9 %


(42.0-52.0) 


  42.1 %


(42.0-52.0)


 


Mean Corpuscular Volume 95 FL (80-99)    93 FL (80-99)  


 


Mean Corpuscular Hemoglobin


  29.8 PG


(27.0-31.0) 


  31.2 PG


(27.0-31.0)  H


 


Mean Corpuscular Hemoglobin


Concent 31.4 G/DL


(32.0-36.0)  L 


  33.4 G/DL


(32.0-36.0)


 


Red Cell Distribution Width


  14.6 %


(11.6-14.8) 


  13.8 %


(11.6-14.8)


 


Platelet Count


  200 K/UL


(150-450) 


  176 K/UL


(150-450)


 


Mean Platelet Volume


  9.6 FL


(6.5-10.1) 


  8.6 FL


(6.5-10.1)


 


Neutrophils (%) (Auto)


  56.0 %


(45.0-75.0) 


  48.6 %


(45.0-75.0)


 


Lymphocytes (%) (Auto)


  25.3 %


(20.0-45.0) 


  32.1 %


(20.0-45.0)


 


Monocytes (%) (Auto)


  7.0 %


(1.0-10.0) 


  7.1 %


(1.0-10.0)


 


Eosinophils (%) (Auto)


  9.5 %


(0.0-3.0)  H 


  11.1 %


(0.0-3.0)  H


 


Basophils (%) (Auto)


  2.2 %


(0.0-2.0)  H 


  1.1 %


(0.0-2.0)


 


Prothrombin Time


  10.7 SEC


(9.30-11.50) 


  


 


 


Prothromb Time International


Ratio 1.0 (0.9-1.1)  


  


  


 


 


Activated Partial


Thromboplast Time 28 SEC (23-33)


  


  


 


 


Sodium Level


  144 MMOL/L


(136-145) 


  142 MMOL/L


(136-145)


 


Potassium Level


  4.0 MMOL/L


(3.5-5.1) 


  3.4 MMOL/L


(3.5-5.1)  L


 


Chloride Level


  106 MMOL/L


() 


  107 MMOL/L


()


 


Carbon Dioxide Level


  21 MMOL/L


(21-32) 


  22 MMOL/L


(21-32)


 


Anion Gap


  17 mmol/L


(5-15)  H 


  14 mmol/L


(5-15)


 


Blood Urea Nitrogen


  12 mg/dL


(7-18) 


  9 mg/dL (7-18)


 


 


Creatinine


  1.7 MG/DL


(0.55-1.30)  H 


  1.6 MG/DL


(0.55-1.30)  H


 


Estimat Glomerular Filtration


Rate 51.5 mL/min


(>60) 


  55.3 mL/min


(>60)


 


Glucose Level


  89 MG/DL


() 


  115 MG/DL


()  H


 


Calcium Level


  9.4 MG/DL


(8.5-10.1) 


  8.6 MG/DL


(8.5-10.1)


 


Total Bilirubin


  0.5 MG/DL


(0.2-1.0) 


  


 


 


Aspartate Amino Transf


(AST/SGOT) 15 U/L (15-37)


  


  


 


 


Alanine Aminotransferase


(ALT/SGPT) 20 U/L (12-78)


  


  


 


 


Alkaline Phosphatase


  48 U/L


() 


  


 


 


Troponin I


  0.012 ng/mL


(0.000-0.056) 


  0.012 ng/mL


(0.000-0.056)


 


Pro-B-Type Natriuretic Peptide


  143 pg/mL


(0-125)  H 


  


 


 


Total Protein


  7.6 G/DL


(6.4-8.2) 


  


 


 


Albumin


  4.4 G/DL


(3.4-5.0) 


  


 


 


Globulin 3.2 g/dL    


 


Albumin/Globulin Ratio 1.4 (1.0-2.7)    


 


Lipase


  223 U/L


() 


  


 


 


Urine Opiates Screen


  


  Negative


(NEGATIVE) 


 


 


Urine Barbiturates Screen


  


  Negative


(NEGATIVE) 


 


 


Phencyclidine (PCP) Screen


  


  Negative


(NEGATIVE) 


 


 


Urine Amphetamines Screen


  


  Negative


(NEGATIVE) 


 


 


Urine Benzodiazepines Screen


  


  Negative


(NEGATIVE) 


 


 


Urine Cocaine Screen


  


  Negative


(NEGATIVE) 


 


 


Urine Marijuana (THC) Screen


  


  Positive


(NEGATIVE)  Anirudh Hamilton MD Mar 3, 2020 10:32

## 2020-03-03 NOTE — HISTORY AND PHYSICAL
History of Present Illness


General


Date patient seen:  Mar 3, 2020


Time patient seen:  08:30


Reason for Hospitalization:  Chest Pain





Present Illness


HPI


Mr. Feliciano is a 54 year old male with hx of recurrent chest pain (no CAD on cath 

in 2017), HTN, gout, presenting with chest pain that started yesterday. He 

reports resting at home around 5:30PM when he suddenly felt chest pain that 

self resolved after 2 minutes. radiated to the back and left arm. Reports 

similar pain previously, but this time "it was much worse." denies any fever, 

chills, SOB, n/v. He says he tried nitroglycerin previously but did not help 

with his "chest pain" episodes. Currently resting in bed, no issues. Had a 

chest pain around 3AM, that also lasted ~2 minutes


Allergies:  


Coded Allergies:  


     KETOROLAC (Unverified  Adverse Reaction, Intermediate, n/v, 4/7/19)


Uncoded Allergies:  


     TORODOL (Allergy, Unknown, 10/21/16)





Medication History


Scheduled


Allopurinol* (Allopurinol*), 100 MG ORAL DAILY, (Reported)


Amlodipine Besylate* (Amlodipine Besylate*), 2.5 MG ORAL DAILY, (Reported)


Aspirin* (Aspirin*), 325 MG ORAL DAILY, (Reported)


Gabapentin* (Gabapentin*), 800 MG ORAL THREE TIMES A DAY, (Reported)


Isosorbide Dinitrate (Isosorbide Dinitrate*), 5 MG ORAL DAILY


Lisinopril (Lisinopril*), Unknown Dose ORAL DAILY, (Reported)


Omeprazole (Omeprazole), 20 MG PO DAILY, (Reported)





Scheduled PRN


Acetaminophen (Tylenol), 650 MG ORAL Q12HR PRN for Prn Pain/Headache/Temp > 101


Clonidine Hcl* (Catapres*), 0.1 MG ORAL DAILY PRN for For High Blood Pressure, (

Reported)





Patient History


Healthcare decision maker





Resuscitation status





Advanced Directive on File








Review of Systems


Constitutional:  Denies: no symptoms, see HPI, chills, sweats, fever, malaise, 

weakness, other


Eye:  Denies: no symptoms, see HPI, eye pain, blurred vision, tearing, double 

vision, nose pain, nose congestion, acuity changes, discharge, other


ENT:  Denies: no symptoms, see HPI, ear pain, ear discharge, nose pain, nose 

congestion, throat pain, throat swelling, mouth pain, hearing loss, nasal 

discharge, other


Respiratory:  Denies: no symptoms, see HPI, cough, orthopnea, shortness of 

breath, stridor, wheezing, LANDRY, sputum, other


Cardiovascular:  Reports: chest pain


Gastrointestinal:  Denies: no symptoms, see HPI, abdominal pain, constipation, 

diarrhea, nausea, vomiting, melena, hematemesis, other


Genitourinary:  Denies: no symptoms, see HPI, discharge, dysuria, frequency, 

hematuria, pain, retention, incontinence, urgency, vag bleed/dc, other


Musculoskeletal:  Denies: no symptoms, see HPI, back pain, gout, joint pain, 

joint swelling, muscle pain, muscle stiffness, other


Skin:  Denies: no symptoms, see HPI, rash, change in color, change in hair/nails

, dryness, lesions, other


Psychiatric:  Denies: no symptoms, see HPI, prior hx, anxiety, depressed 

feelings, emotional problems, SI, HI, hallucinations, other


Neurological:  Denies: no symptoms, see HPI, headache, numbness, paresthesia, 

seizure, tingling, tremors, focal weakness, syncope, dizziness, other


Endocrine:  Denies: no symptoms, see HPI, excessive sweating, flushing, 

intolerance to temperature, increased thirst, increased urine, unexplained 

weight loss, other


Hematologic/Lymphatic:  Denies: no symptoms, see HPI, anemia, blood clots, easy 

bleeding, easy bruising, swollen glands, diathesis, other


All Other Systems:  negative except mentioned in HPI





Physical Exam


General Appearance:  no apparent distress, alert


HEENT:  normocephalic, atraumatic


Neck:  supple


Respiratory/Chest:  lungs clear, normal breath sounds, no respiratory distress


Cardiovascular/Chest:  normal rate, regular rhythm


Abdomen:  non tender, soft


Neurologic:  alert, oriented x 3





Last 24 Hour Vital Signs








  Date Time  Temp Pulse Resp B/P (MAP) Pulse Ox O2 Delivery O2 Flow Rate FiO2


 


3/3/20 09:00      Room Air  


 


3/3/20 08:50  64  147/84    


 


3/3/20 08:49    147/84    


 


3/3/20 08:00  75      


 


3/3/20 08:00 97.5 64 18 147/84 (105) 98   


 


3/3/20 06:41 97.2       


 


3/3/20 04:00  70      


 


3/3/20 04:00 97.2 66 18 144/91 (108) 97   


 


3/3/20 01:54      Room Air  


 


3/3/20 01:14      Room Air  


 


3/3/20 00:00  80      


 


3/3/20 00:00 97.0 75 16 141/93 (109) 98   


 


3/2/20 23:45 97.2 82 17 160/92 100 Room Air  


 


3/2/20 23:45 97.2 82 17 160/92 100 Room Air  


 


3/2/20 23:43    180/100    


 


3/2/20 22:31 97.2       


 


3/2/20 22:10 97.2 78 16 155/96 100 Room Air  


 


3/2/20 21:30    160/95    


 


3/2/20 21:25 97.2 80 17 163/106 100 Room Air  


 


3/2/20 21:25  66 17   Room Air  


 


3/2/20 21:15 97.2 66 17 163/106 (125) 100 Room Air  

















Intake and Output  


 


 3/2/20 3/3/20





 19:00 07:00


 


Intake Total  1000 ml


 


Balance  1000 ml


 


  


 


Intake IV Total  1000 ml


 


# Voids  1


 


# Bowel Movements  2











Laboratory Tests








Test


  3/2/20


21:25 3/2/20


23:30 3/3/20


04:00


 


White Blood Count


  8.0 K/UL


(4.8-10.8) 


  6.7 K/UL


(4.8-10.8)


 


Red Blood Count


  4.84 M/UL


(4.70-6.10) 


  4.51 M/UL


(4.70-6.10)  L


 


Hemoglobin


  14.4 G/DL


(14.2-18.0) 


  14.1 G/DL


(14.2-18.0)  L


 


Hematocrit


  45.9 %


(42.0-52.0) 


  42.1 %


(42.0-52.0)


 


Mean Corpuscular Volume 95 FL (80-99)    93 FL (80-99)  


 


Mean Corpuscular Hemoglobin


  29.8 PG


(27.0-31.0) 


  31.2 PG


(27.0-31.0)  H


 


Mean Corpuscular Hemoglobin


Concent 31.4 G/DL


(32.0-36.0)  L 


  33.4 G/DL


(32.0-36.0)


 


Red Cell Distribution Width


  14.6 %


(11.6-14.8) 


  13.8 %


(11.6-14.8)


 


Platelet Count


  200 K/UL


(150-450) 


  176 K/UL


(150-450)


 


Mean Platelet Volume


  9.6 FL


(6.5-10.1) 


  8.6 FL


(6.5-10.1)


 


Neutrophils (%) (Auto)


  56.0 %


(45.0-75.0) 


  48.6 %


(45.0-75.0)


 


Lymphocytes (%) (Auto)


  25.3 %


(20.0-45.0) 


  32.1 %


(20.0-45.0)


 


Monocytes (%) (Auto)


  7.0 %


(1.0-10.0) 


  7.1 %


(1.0-10.0)


 


Eosinophils (%) (Auto)


  9.5 %


(0.0-3.0)  H 


  11.1 %


(0.0-3.0)  H


 


Basophils (%) (Auto)


  2.2 %


(0.0-2.0)  H 


  1.1 %


(0.0-2.0)


 


Prothrombin Time


  10.7 SEC


(9.30-11.50) 


  


 


 


Prothromb Time International


Ratio 1.0 (0.9-1.1)  


  


  


 


 


Activated Partial


Thromboplast Time 28 SEC (23-33)


  


  


 


 


Sodium Level


  144 MMOL/L


(136-145) 


  142 MMOL/L


(136-145)


 


Potassium Level


  4.0 MMOL/L


(3.5-5.1) 


  3.4 MMOL/L


(3.5-5.1)  L


 


Chloride Level


  106 MMOL/L


() 


  107 MMOL/L


()


 


Carbon Dioxide Level


  21 MMOL/L


(21-32) 


  22 MMOL/L


(21-32)


 


Anion Gap


  17 mmol/L


(5-15)  H 


  14 mmol/L


(5-15)


 


Blood Urea Nitrogen


  12 mg/dL


(7-18) 


  9 mg/dL (7-18)


 


 


Creatinine


  1.7 MG/DL


(0.55-1.30)  H 


  1.6 MG/DL


(0.55-1.30)  H


 


Estimat Glomerular Filtration


Rate 51.5 mL/min


(>60) 


  55.3 mL/min


(>60)


 


Glucose Level


  89 MG/DL


() 


  115 MG/DL


()  H


 


Calcium Level


  9.4 MG/DL


(8.5-10.1) 


  8.6 MG/DL


(8.5-10.1)


 


Total Bilirubin


  0.5 MG/DL


(0.2-1.0) 


  


 


 


Aspartate Amino Transf


(AST/SGOT) 15 U/L (15-37)


  


  


 


 


Alanine Aminotransferase


(ALT/SGPT) 20 U/L (12-78)


  


  


 


 


Alkaline Phosphatase


  48 U/L


() 


  


 


 


Troponin I


  0.012 ng/mL


(0.000-0.056) 


  0.012 ng/mL


(0.000-0.056)


 


Pro-B-Type Natriuretic Peptide


  143 pg/mL


(0-125)  H 


  


 


 


Total Protein


  7.6 G/DL


(6.4-8.2) 


  


 


 


Albumin


  4.4 G/DL


(3.4-5.0) 


  


 


 


Globulin 3.2 g/dL    


 


Albumin/Globulin Ratio 1.4 (1.0-2.7)    


 


Lipase


  223 U/L


() 


  


 


 


Urine Opiates Screen


  


  Negative


(NEGATIVE) 


 


 


Urine Barbiturates Screen


  


  Negative


(NEGATIVE) 


 


 


Phencyclidine (PCP) Screen


  


  Negative


(NEGATIVE) 


 


 


Urine Amphetamines Screen


  


  Negative


(NEGATIVE) 


 


 


Urine Benzodiazepines Screen


  


  Negative


(NEGATIVE) 


 


 


Urine Cocaine Screen


  


  Negative


(NEGATIVE) 


 


 


Urine Marijuana (THC) Screen


  


  Positive


(NEGATIVE)  H 


 








Height (Feet):  5


Height (Inches):  6.00


Weight (Pounds):  187


Medications





Current Medications








 Medications


  (Trade)  Dose


 Ordered  Sig/Rodney


 Route


 PRN Reason  Start Time


 Stop Time Status Last Admin


Dose Admin


 


 Acetaminophen


  (Tylenol)  650 mg  Q4H  PRN


 ORAL


 Mild Pain (Pain Scale 1-3)  3/2/20 22:30


 4/1/20 22:29   


 


 


 Al Hydroxide/Mg


 Hydroxide


  (Mylanta II)  30 ml  Q6H  PRN


 ORAL


 dyspepsia  3/2/20 22:30


 4/1/20 22:29   


 


 


 Allopurinol


  (Zyloprim)  100 mg  DAILY


 ORAL


   3/3/20 09:00


 4/2/20 08:59  3/3/20 08:49


 


 


 Amlodipine


 Besylate


  (Norvasc)  2.5 mg  DAILY


 ORAL


   3/3/20 09:00


 4/2/20 08:59  3/3/20 08:50


 


 


 Aspirin


  (ASA)  81 mg  DAILY


 ORAL


   3/3/20 09:00


 4/2/20 08:59  3/3/20 08:50


 


 


 Dextrose


  (Dextrose 50%)  25 ml  Q30M  PRN


 IV


 Hypoglycemia  3/2/20 22:30


 4/1/20 22:29   


 


 


 Dextrose


  (Dextrose 50%)  50 ml  Q30M  PRN


 IV


 Hypoglycemia  3/2/20 22:30


 4/1/20 22:29   


 


 


 Diphenhydramine


 HCl


  (Benadryl)  25 mg  Q6H  PRN


 ORAL


 Itching/Pruritis  3/2/20 22:30


 4/1/20 22:29   


 


 


 Docusate Sodium


  (Colace)  100 mg  EVERY 12  HOURS


 ORAL


   3/3/20 09:00


 4/2/20 08:59  3/3/20 08:49


 


 


 Gabapentin


  (Neurontin)  800 mg  TID


 ORAL


   3/3/20 09:00


 4/2/20 08:59  3/3/20 08:49


 


 


 Heparin Sodium


  (Porcine)


  (Heparin 5000


 units/ml)  5,000 units  EVERY 12  HOURS


 SUBQ


   3/3/20 09:00


 4/2/20 08:59  3/3/20 08:51


 


 


 Hydromorphone HCl


  (Dilaudid)  0.5 mg  Q4H  PRN


 IVP


 Moderate Pain (Pain Scale 4-6)  3/2/20 22:30


 3/9/20 22:29   


 


 


 Hydromorphone HCl


  (Dilaudid)  1 mg  Q4H  PRN


 IVP


 Severe Pain (Pain Scale 7-10)  3/2/20 22:30


 3/9/20 22:29  3/3/20 10:15


 


 


 Iohexol


  (Omnipaque 350


 100ml)  100 ml  NOW  PRN


 INJ


 Radiology Procedure  3/2/20 21:30


 3/4/20 21:20   


 


 


 Iohexol


  (Omnipaque 350


 100ml)  100 ml  NOW  PRN


 INJ


 Radiology Procedure  3/2/20 21:30


 3/4/20 21:20   


 


 


 Isosorbide


 Mononitrate


  (Imdur)  30 mg  DAILY


 ORAL


   3/3/20 09:00


 4/2/20 08:59  3/3/20 08:49


 


 


 Lorazepam


  (Ativan 2mg/ml


 1ml)  0.5 mg  Q4H  PRN


 IV


 For Anxiety  3/2/20 22:30


 3/9/20 22:29   


 


 


 Nitroglycerin


  (Ntg)  0.4 mg  Q5M  PRN


 SL


 Prn Chest Pain  3/2/20 22:30


 4/1/20 22:29   


 


 


 Ondansetron HCl


  (Zofran)  4 mg  Q6H  PRN


 IVP


 Nausea & Vomiting  3/2/20 22:30


 4/1/20 22:29   


 


 


 Pantoprazole


  (Protonix)  40 mg  DAILY


 ORAL


   3/3/20 09:00


 4/2/20 08:59  3/3/20 08:50


 


 


 Regadenoson


  (Lexiscan)  0.4 mg  ONCE  PRN


 IV


 STRESS TEST  3/3/20 10:45


 3/5/20 23:59   


 











Assessment/Plan


Problem List:  


(1) Chest pain


ICD Codes:  R07.9 - Chest pain, unspecified


SNOMED:  48672447


Qualifiers:  


   Qualified Codes:  R07.9 - Chest pain, unspecified


(2) HTN (hypertension)


ICD Codes:  I10 - Essential (primary) hypertension


SNOMED:  07458459


(3) Renal failure (ARF), acute on chronic


ICD Codes:  N17.9 - Acute kidney failure, unspecified; N18.9 - Chronic kidney 

disease, unspecified


SNOMED:  749406561


(4) CKD (chronic kidney disease)


ICD Codes:  N18.9 - Chronic kidney disease, unspecified


SNOMED:  115580420


(5) Chest pain


ICD Codes:  R07.9 - Chest pain, unspecified


SNOMED:  74426337


(6) Chest pain


ICD Codes:  R07.9 - Chest pain, unspecified


SNOMED:  80547405


(7) Hypertensive urgency


ICD Codes:  I16.0 - Hypertensive urgency


SNOMED:  520249077


(8) Chest wall pain


ICD Codes:  R07.89 - Other chest pain


SNOMED:  233457046


Status:  stable


Assessment/Plan:


#Recurrent chest pain


#?chest wall pain


-EKG, troponin so far negative.


-Cardiology consult appreciated.


-f/u TTE, stress test (ordered) 


-Continue imdur 30


-continue asa 81 


-NTG paste, SL prn for chest pain





#JALEEL on CKD3


#CKD3


unclear baseline.


-nephro consult appreciated.


-trend BMP





#HTN


-continue lisinopril





#Hx of gout


-continue allopurinol. 





Extra 36 minutes spent on chart review of pertinent information, including labs

, medications, imaging, prior physiciani documentation. 





Time of note does not reflect the time of encounter.











Evelyn Ang M.D. Mar 3, 2020 11:50

## 2020-03-03 NOTE — CONSULTATION
History of Present Illness


General


Chief Complaint:  Chest Pain


Reason for Consultation:  Ruth Ann





Present Illness


HPI


This is a 54 year old male with hx of recurrent chest pain (no CAD on cath in 

2017), HTN, gout, presenting with chest pain that started yesterday. He reports 

resting at home around 5:30PM when he suddenly felt chest pain that self 

resolved after 2 minutes. radiated to the back and left arm. Reports similar 

pain previously, but this time "it was much worse." denies any fever, chills, 

SOB, n/v. He says he tried nitroglycerin previously but did not help with his 

"chest pain" episodes. Currently resting in bed, no issues. Had a chest pain 

around 3AM, that also lasted ~2 minutes


Allergies:  


Coded Allergies:  


     KETOROLAC (Unverified  Adverse Reaction, Intermediate, n/v, 4/7/19)


Uncoded Allergies:  


     TORODOL (Allergy, Unknown, 10/21/16)





Medication History


Scheduled


Allopurinol* (Allopurinol*), 100 MG ORAL DAILY, (Reported)


Amlodipine Besylate* (Amlodipine Besylate*), 2.5 MG ORAL DAILY, (Reported)


Aspirin* (Aspirin*), 325 MG ORAL DAILY, (Reported)


Gabapentin* (Gabapentin*), 800 MG ORAL THREE TIMES A DAY, (Reported)


Isosorbide Dinitrate (Isosorbide Dinitrate*), 5 MG ORAL DAILY


Lisinopril (Lisinopril*), Unknown Dose ORAL DAILY, (Reported)


Omeprazole (Omeprazole), 20 MG PO DAILY, (Reported)





Scheduled PRN


Acetaminophen (Tylenol), 650 MG ORAL Q12HR PRN for Prn Pain/Headache/Temp > 101


Clonidine Hcl* (Catapres*), 0.1 MG ORAL DAILY PRN for For High Blood Pressure, (

Reported)





Patient History


Healthcare decision maker





Resuscitation status





Advanced Directive on File








Review of Systems


Eye:  Reports: no symptoms


Respiratory:  Reports: shortness of breath


Cardiovascular:  Reports: chest pain


Gastrointestinal:  Reports: no symptoms


Genitourinary:  Reports: no symptoms


Skin:  Reports: no symptoms


Psychiatric:  Reports: no symptoms


Neurological:  Reports: no symptoms


Endocrine:  Reports: no symptoms





Physical Exam





Last 24 Hour Vital Signs








  Date Time  Temp Pulse Resp B/P (MAP) Pulse Ox O2 Delivery O2 Flow Rate FiO2


 


3/3/20 08:00 97.5 64 18 147/84 (105) 98   


 


3/3/20 06:41 97.2       


 


3/3/20 04:00  70      


 


3/3/20 04:00 97.2 66 18 144/91 (108) 97   


 


3/3/20 01:54      Room Air  


 


3/3/20 01:14      Room Air  


 


3/3/20 00:00  80      


 


3/3/20 00:00 97.0 75 16 141/93 (109) 98   


 


3/2/20 23:45 97.2 82 17 160/92 100 Room Air  


 


3/2/20 23:45 97.2 82 17 160/92 100 Room Air  


 


3/2/20 23:43    180/100    


 


3/2/20 22:31 97.2       


 


3/2/20 22:10 97.2 78 16 155/96 100 Room Air  


 


3/2/20 21:30    160/95    


 


3/2/20 21:25 97.2 80 17 163/106 100 Room Air  


 


3/2/20 21:25  66 17   Room Air  


 


3/2/20 21:15 97.2 66 17 163/106 (125) 100 Room Air  

















Intake and Output  


 


 3/2/20 3/3/20





 19:00 07:00


 


Intake Total  1000 ml


 


Balance  1000 ml


 


  


 


Intake IV Total  1000 ml


 


# Voids  1


 


# Bowel Movements  2











Laboratory Tests








Test


  3/2/20


21:25 3/2/20


23:30 3/3/20


04:00


 


White Blood Count


  8.0 K/UL


(4.8-10.8) 


  6.7 K/UL


(4.8-10.8)


 


Red Blood Count


  4.84 M/UL


(4.70-6.10) 


  4.51 M/UL


(4.70-6.10)  L


 


Hemoglobin


  14.4 G/DL


(14.2-18.0) 


  14.1 G/DL


(14.2-18.0)  L


 


Hematocrit


  45.9 %


(42.0-52.0) 


  42.1 %


(42.0-52.0)


 


Mean Corpuscular Volume 95 FL (80-99)    93 FL (80-99)  


 


Mean Corpuscular Hemoglobin


  29.8 PG


(27.0-31.0) 


  31.2 PG


(27.0-31.0)  H


 


Mean Corpuscular Hemoglobin


Concent 31.4 G/DL


(32.0-36.0)  L 


  33.4 G/DL


(32.0-36.0)


 


Red Cell Distribution Width


  14.6 %


(11.6-14.8) 


  13.8 %


(11.6-14.8)


 


Platelet Count


  200 K/UL


(150-450) 


  176 K/UL


(150-450)


 


Mean Platelet Volume


  9.6 FL


(6.5-10.1) 


  8.6 FL


(6.5-10.1)


 


Neutrophils (%) (Auto)


  56.0 %


(45.0-75.0) 


  48.6 %


(45.0-75.0)


 


Lymphocytes (%) (Auto)


  25.3 %


(20.0-45.0) 


  32.1 %


(20.0-45.0)


 


Monocytes (%) (Auto)


  7.0 %


(1.0-10.0) 


  7.1 %


(1.0-10.0)


 


Eosinophils (%) (Auto)


  9.5 %


(0.0-3.0)  H 


  11.1 %


(0.0-3.0)  H


 


Basophils (%) (Auto)


  2.2 %


(0.0-2.0)  H 


  1.1 %


(0.0-2.0)


 


Prothrombin Time


  10.7 SEC


(9.30-11.50) 


  


 


 


Prothromb Time International


Ratio 1.0 (0.9-1.1)  


  


  


 


 


Activated Partial


Thromboplast Time 28 SEC (23-33)


  


  


 


 


Sodium Level


  144 MMOL/L


(136-145) 


  142 MMOL/L


(136-145)


 


Potassium Level


  4.0 MMOL/L


(3.5-5.1) 


  3.4 MMOL/L


(3.5-5.1)  L


 


Chloride Level


  106 MMOL/L


() 


  107 MMOL/L


()


 


Carbon Dioxide Level


  21 MMOL/L


(21-32) 


  22 MMOL/L


(21-32)


 


Anion Gap


  17 mmol/L


(5-15)  H 


  14 mmol/L


(5-15)


 


Blood Urea Nitrogen


  12 mg/dL


(7-18) 


  9 mg/dL (7-18)


 


 


Creatinine


  1.7 MG/DL


(0.55-1.30)  H 


  1.6 MG/DL


(0.55-1.30)  H


 


Estimat Glomerular Filtration


Rate 51.5 mL/min


(>60) 


  55.3 mL/min


(>60)


 


Glucose Level


  89 MG/DL


() 


  115 MG/DL


()  H


 


Calcium Level


  9.4 MG/DL


(8.5-10.1) 


  8.6 MG/DL


(8.5-10.1)


 


Total Bilirubin


  0.5 MG/DL


(0.2-1.0) 


  


 


 


Aspartate Amino Transf


(AST/SGOT) 15 U/L (15-37)


  


  


 


 


Alanine Aminotransferase


(ALT/SGPT) 20 U/L (12-78)


  


  


 


 


Alkaline Phosphatase


  48 U/L


() 


  


 


 


Troponin I


  0.012 ng/mL


(0.000-0.056) 


  0.012 ng/mL


(0.000-0.056)


 


Pro-B-Type Natriuretic Peptide


  143 pg/mL


(0-125)  H 


  


 


 


Total Protein


  7.6 G/DL


(6.4-8.2) 


  


 


 


Albumin


  4.4 G/DL


(3.4-5.0) 


  


 


 


Globulin 3.2 g/dL    


 


Albumin/Globulin Ratio 1.4 (1.0-2.7)    


 


Lipase


  223 U/L


() 


  


 


 


Urine Opiates Screen


  


  Negative


(NEGATIVE) 


 


 


Urine Barbiturates Screen


  


  Negative


(NEGATIVE) 


 


 


Phencyclidine (PCP) Screen


  


  Negative


(NEGATIVE) 


 


 


Urine Amphetamines Screen


  


  Negative


(NEGATIVE) 


 


 


Urine Benzodiazepines Screen


  


  Negative


(NEGATIVE) 


 


 


Urine Cocaine Screen


  


  Negative


(NEGATIVE) 


 


 


Urine Marijuana (THC) Screen


  


  Positive


(NEGATIVE)  H 


 








Height (Feet):  5


Height (Inches):  6.00


Weight (Pounds):  187


Medications





Current Medications








 Medications


  (Trade)  Dose


 Ordered  Sig/Rodney


 Route


 PRN Reason  Start Time


 Stop Time Status Last Admin


Dose Admin


 


 Acetaminophen


  (Tylenol)  650 mg  Q4H  PRN


 ORAL


 Mild Pain (Pain Scale 1-3)  3/2/20 22:30


 4/1/20 22:29   


 


 


 Al Hydroxide/Mg


 Hydroxide


  (Mylanta II)  30 ml  Q6H  PRN


 ORAL


 dyspepsia  3/2/20 22:30


 4/1/20 22:29   


 


 


 Allopurinol


  (Zyloprim)  100 mg  DAILY


 ORAL


   3/3/20 09:00


 4/2/20 08:59   


 


 


 Amlodipine


 Besylate


  (Norvasc)  2.5 mg  DAILY


 ORAL


   3/3/20 09:00


 4/2/20 08:59   


 


 


 Aspirin


  (ASA)  81 mg  DAILY


 ORAL


   3/3/20 09:00


 4/2/20 08:59   


 


 


 Dextrose


  (Dextrose 50%)  25 ml  Q30M  PRN


 IV


 Hypoglycemia  3/2/20 22:30


 4/1/20 22:29   


 


 


 Dextrose


  (Dextrose 50%)  50 ml  Q30M  PRN


 IV


 Hypoglycemia  3/2/20 22:30


 4/1/20 22:29   


 


 


 Diphenhydramine


 HCl


  (Benadryl)  25 mg  Q6H  PRN


 ORAL


 Itching/Pruritis  3/2/20 22:30


 4/1/20 22:29   


 


 


 Docusate Sodium


  (Colace)  100 mg  EVERY 12  HOURS


 ORAL


   3/3/20 09:00


 4/2/20 08:59   


 


 


 Gabapentin


  (Neurontin)  800 mg  TID


 ORAL


   3/3/20 09:00


 4/2/20 08:59   


 


 


 Heparin Sodium


  (Porcine)


  (Heparin 5000


 units/ml)  5,000 units  EVERY 12  HOURS


 SUBQ


   3/3/20 09:00


 4/2/20 08:59   


 


 


 Hydromorphone HCl


  (Dilaudid)  0.5 mg  Q4H  PRN


 IVP


 Moderate Pain (Pain Scale 4-6)  3/2/20 22:30


 3/9/20 22:29   


 


 


 Hydromorphone HCl


  (Dilaudid)  1 mg  Q4H  PRN


 IVP


 Severe Pain (Pain Scale 7-10)  3/2/20 22:30


 3/9/20 22:29  3/3/20 06:11


 


 


 Iohexol


  (Omnipaque 350


 100ml)  100 ml  NOW  PRN


 INJ


 Radiology Procedure  3/2/20 21:30


 3/4/20 21:20   


 


 


 Iohexol


  (Omnipaque 350


 100ml)  100 ml  NOW  PRN


 INJ


 Radiology Procedure  3/2/20 21:30


 3/4/20 21:20   


 


 


 Isosorbide


 Mononitrate


  (Imdur)  30 mg  DAILY


 ORAL


   3/3/20 09:00


 4/2/20 08:59   


 


 


 Lorazepam


  (Ativan 2mg/ml


 1ml)  0.5 mg  Q4H  PRN


 IV


 For Anxiety  3/2/20 22:30


 3/9/20 22:29   


 


 


 Nitroglycerin


  (Ntg)  0.4 mg  Q5M  PRN


 SL


 Prn Chest Pain  3/2/20 22:30


 4/1/20 22:29   


 


 


 Ondansetron HCl


  (Zofran)  4 mg  Q6H  PRN


 IVP


 Nausea & Vomiting  3/2/20 22:30


 4/1/20 22:29   


 


 


 Pantoprazole


  (Protonix)  40 mg  DAILY


 ORAL


   3/3/20 09:00


 4/2/20 08:59   


 








Objective Narrative


General Appearance:  no apparent distress, alert


HEENT:  normocephalic, atraumatic


Neck:  supple


Respiratory/Chest:  lungs clear, normal breath sounds, no respiratory distress


Cardiovascular/Chest:  normal rate, regular rhythm


Abdomen:  non tender, soft


Neurologic:  alert, oriented x 3





Assessment/Plan


Diagnosis


Axis I:


#RUTH ANN on CKD


#hypokalemia


#Chest pain r/o ACS


#HTN


# gout


#h/o spinal fusion


# history of craniotomy 


#history of tracheostomy








-s/p 1L NS on presentation 


- replete k


- Check uA 


-  UTp/cr


- hold lisinopril


- renal US


- check uric acid level in am


- monitor BP 


- monitor BMP


- 2d echo


- stress test per cardiology











Magda Weller M.D. Mar 3, 2020 08:38

## 2020-03-03 NOTE — DIAGNOSTIC IMAGING REPORT
Indication: Dyspnea

 

Comparison:  1/29/2020

 

A single view chest radiograph was obtained.

 

Findings:

 

Cardiomediastinal appearance is within normal limits for age. The lungs are clear.

Pulmonary vascularity is appropriate. The diaphragmatic contour is smooth and

costophrenic angles are sharp. No pleural effusions are identified. The bones are

unremarkable.

 

Impression: No acute findings

## 2020-03-04 VITALS — SYSTOLIC BLOOD PRESSURE: 137 MMHG | DIASTOLIC BLOOD PRESSURE: 76 MMHG

## 2020-03-04 VITALS — SYSTOLIC BLOOD PRESSURE: 150 MMHG | DIASTOLIC BLOOD PRESSURE: 90 MMHG

## 2020-03-04 VITALS — SYSTOLIC BLOOD PRESSURE: 140 MMHG | DIASTOLIC BLOOD PRESSURE: 92 MMHG

## 2020-03-04 VITALS — DIASTOLIC BLOOD PRESSURE: 97 MMHG | SYSTOLIC BLOOD PRESSURE: 159 MMHG

## 2020-03-04 VITALS — SYSTOLIC BLOOD PRESSURE: 141 MMHG | DIASTOLIC BLOOD PRESSURE: 88 MMHG

## 2020-03-04 VITALS — DIASTOLIC BLOOD PRESSURE: 84 MMHG | SYSTOLIC BLOOD PRESSURE: 131 MMHG

## 2020-03-04 LAB
ANION GAP SERPL CALC-SCNC: 12 MMOL/L (ref 5–15)
BUN SERPL-MCNC: 10 MG/DL (ref 7–18)
CALCIUM SERPL-MCNC: 9.1 MG/DL (ref 8.5–10.1)
CHLORIDE SERPL-SCNC: 104 MMOL/L (ref 98–107)
CO2 SERPL-SCNC: 26 MMOL/L (ref 21–32)
CREAT SERPL-MCNC: 1.9 MG/DL (ref 0.55–1.3)
POTASSIUM SERPL-SCNC: 4.3 MMOL/L (ref 3.5–5.1)
SODIUM SERPL-SCNC: 142 MMOL/L (ref 136–145)

## 2020-03-04 RX ADMIN — DIPHENHYDRAMINE HYDROCHLORIDE PRN MG: 50 INJECTION INTRAMUSCULAR; INTRAVENOUS at 08:55

## 2020-03-04 RX ADMIN — DOCUSATE SODIUM SCH MG: 100 CAPSULE, LIQUID FILLED ORAL at 08:53

## 2020-03-04 RX ADMIN — DIPHENHYDRAMINE HYDROCHLORIDE PRN MG: 50 INJECTION INTRAMUSCULAR; INTRAVENOUS at 14:03

## 2020-03-04 RX ADMIN — ASPIRIN 81 MG SCH MG: 81 TABLET ORAL at 08:54

## 2020-03-04 RX ADMIN — DIPHENHYDRAMINE HYDROCHLORIDE PRN MG: 50 INJECTION INTRAMUSCULAR; INTRAVENOUS at 18:40

## 2020-03-04 RX ADMIN — DOCUSATE SODIUM SCH MG: 100 CAPSULE, LIQUID FILLED ORAL at 21:54

## 2020-03-04 RX ADMIN — ALLOPURINOL SCH MG: 100 TABLET ORAL at 08:53

## 2020-03-04 RX ADMIN — DIPHENHYDRAMINE HYDROCHLORIDE PRN MG: 50 INJECTION INTRAMUSCULAR; INTRAVENOUS at 23:12

## 2020-03-04 RX ADMIN — DIPHENHYDRAMINE HYDROCHLORIDE PRN MG: 50 INJECTION INTRAMUSCULAR; INTRAVENOUS at 04:06

## 2020-03-04 RX ADMIN — ISOSORBIDE MONONITRATE SCH MG: 30 TABLET, EXTENDED RELEASE ORAL at 08:52

## 2020-03-04 RX ADMIN — HEPARIN SODIUM SCH UNITS: 5000 INJECTION INTRAVENOUS; SUBCUTANEOUS at 21:55

## 2020-03-04 RX ADMIN — HEPARIN SODIUM SCH UNITS: 5000 INJECTION INTRAVENOUS; SUBCUTANEOUS at 08:56

## 2020-03-04 NOTE — CDS PHYSICIAN QUERY
Clarification is required for compliance, coding accuracy, and to reflect 
severity of illness for this patient



Dear Dr. Anirudh Crouch                      Date 3/4/2020

/CDS' Name Naomi Oconnor        



Clinical Documentation states: HNP: 54 year old male with hx of recurrent chest 
pain (no CAD on cath in 2017), HTN, gout, presenting with chest pain that 
started yesterday. He reports resting at home around 5:30PM when he suddenly 
felt chest pain that self resolved after 2 minutes



3/4 Cardio note: hest pain, ruled out for myocardial infarction.  Cardiac 
catheterization in 2017 showed no evidence of coronary artery disease.   His 
EKG also does not show any acute ischemic changes. Dobutamine Echo yesterday 
was nonischemic



Please document the suspected etiology of Chest Pain:



[] Aortic dissection                   

[] Acute myocardial infarction              

[] Acute Coronary Syndrome                   

[] Pericarditis   

[] Anxiety                         

[] Cancer                         

[] Pneumonia

[] Costochondritis             

[] Pneumothorax      

[] GERD/Esophagitis          

[] Pulmonary embolism   

[] Other:___________________

[] Unable to determine



Present on Admission:  []  Yes          []  No         []  Clinically 
Undetermined



_________________                                    _____________

Physician signature                                       Date



Please also document in your Progress Notes and/or Discharge Summary and 
indicate if the condition was present on admission.
MTDD

## 2020-03-04 NOTE — CONSULTATION
DATE OF CONSULTATION:  03/03/2020

CARDIOLOGY CONSULTATION



CONSULTING PHYSICIAN:  Anirudh Crouch M.D.



REFERRING PHYSICIAN:  Luis Alfredo Calderon M.D.



REASON FOR CONSULTATION:  Chest pain.



HISTORY OF PRESENT ILLNESS:  The patient is a 54-year-old gentleman with

history of hypertension, chronic kidney disease, and history of CVA with

right hemiparesis as well as history of craniotomy at [facility] for

intracranial bleed.  His stroke was in 2011 and 2017.  The patient also

has history of spinal fusion.  The patient has history of recurrent chest

pain and underwent cardiac catheterization in 2017 at [facility] that showed no evidence of coronary artery disease.  The

patient's previous admission was at [facility] in October 2019 when he had

troponin elevation _____00:48 to renal failure.  The patient presented to

the emergency room with chest pain that started at 5 o'clock in the

afternoon, was aggravated with exertion and alleviated with rest.  The

patient had some shortness of breath and came to the emergency room for

further evaluation.  His troponins have been negative, however.



REVIEW OF SYSTEMS:  Negative other than what was mentioned in history of

present illness.



PAST MEDICAL HISTORY:  As mentioned above.



FAMILY HISTORY:  Noncontributory.



SOCIAL HISTORY:  Denies smoking or drinking alcohol.



PHYSICAL EXAMINATION:

VITAL SIGNS:  Blood pressure 147/84, pulse 64, respirations 18, and he is

afebrile.

HEAD AND NECK:  Showed no JVD, has scar of prior tracheostomy that is

closed.

LUNGS:  Clear.

CARDIOVASCULAR:  Shows regular S1 and S2 with no gallop or murmur.

ABDOMEN:  Soft.

EXTREMITIES:  No pitting edema.



LABORATORY DATA:  Sodium 142, potassium 3.4, BUN 9, creatinine 1.6, and

glucose 115.  Troponin negative x2.  BNP is 143.  White count is 6.7,

hemoglobin 14.5, hematocrit 42.1, and platelet count 176,000.



ASSESSMENT AND PLAN:

1. Chest pain, rule out for myocardial infarction.  Cardiac

catheterization in 2017 showed no evidence of coronary artery disease.  We

will proceed with nuclear stress as well as echocardiogram for further

evaluation.  His EKG also does not show any acute ischemic changes.

2. Hypertension.  Continue amlodipine 2.5 mg daily and Imdur 30 mg

daily.  I will add p.r.n. hydralazine to his medical regimen.

3. Chronic kidney disease _____ creatinine _____02:48 1.6 to 1.7.  Further

evaluation by Nephrology.

4. Urine tox screen positive for only marijuana.

5. History of craniotomy, on Neurontin.

6. History of CVA with hemiparesis.



Thank you very much for allowing me to participate in the care of this

patient.  Please do not hesitate to contact me for any questions regarding

my evaluation.









  ______________________________________________

  Anirudh Crouch M.D.





DR:  AMENA

D:  03/03/2020 10:34

T:  03/03/2020 19:41

JOB#:  [id number]

CC:

## 2020-03-04 NOTE — CARDIAC ELECTROPHYSIOLOGY PN
Assessment/Plan


Assessment/Plan


1. Chest pain, ruled out for myocardial infarction.  Cardiac


catheterization in 2017 showed no evidence of coronary artery disease.  


His EKG also does not show any acute ischemic changes.


Dobutamine Echo yesterday was nonischemic





2. Hypertension.  Continue amlodipine 2.5 mg daily, Imdur 30 mg daily and 

p.r.n. hydralazine  





3. Chronic kidney disease   1.6 to 1.7.   


4. Urine tox screen positive for only marijuana.


5. History of craniotomy, on Neurontin.


6. History of CVA with hemiparesis.





Subjective


Subjective


CP. No MI. Cardiac cath at [facility] no CAD in 2017


Had  Echo and stress test yesterday


Persistently asking for Dilaudid





Objective





Last 24 Hour Vital Signs








  Date Time  Temp Pulse Resp B/P (MAP) Pulse Ox O2 Delivery O2 Flow Rate FiO2


 


3/4/20 12:00  75      


 


3/4/20 12:00 97.9 84 18 159/97 (117) 97   


 


3/4/20 09:00      Room Air  


 


3/4/20 08:54  66  140/92    


 


3/4/20 08:52    140/92    


 


3/4/20 08:00 97.7 74 18 140/92 (108) 99   


 


3/4/20 08:00  74      


 


3/4/20 04:00  74      


 


3/4/20 04:00 97.2 69 16 137/76 (96) 98   


 


3/4/20 00:00 97.9 82 16 131/84 (100) 98   


 


3/4/20 00:00  86      


 


3/3/20 21:00      Room Air  


 


3/3/20 20:00  60      


 


3/3/20 20:00 99.1 72 16 140/86 (104) 98   


 


3/3/20 16:00  89      


 


3/3/20 16:00 98.2 86 18 138/70 (92) 96   

















Intake and Output  


 


 3/3/20 3/4/20





 19:00 07:00


 


Intake Total  240 ml


 


Output Total 350 ml 500 ml


 


Balance -350 ml -260 ml


 


  


 


Intake Oral  240 ml


 


Output Urine Total 350 ml 500 ml


 


# Voids  4


 


# Bowel Movements 1 











Laboratory Tests








Test


  3/3/20


22:00 3/4/20


05:45


 


Urine Color Yellow   


 


Urine Appearance Clear   


 


Urine pH 6 (4.5-8.0)   


 


Urine Specific Gravity


  1.020


(1.005-1.035) 


 


 


Urine Protein


  Negative


(NEGATIVE) 


 


 


Urine Glucose (UA)


  Negative


(NEGATIVE) 


 


 


Urine Ketones


  Negative


(NEGATIVE) 


 


 


Urine Blood


  Negative


(NEGATIVE) 


 


 


Urine Nitrite


  Negative


(NEGATIVE) 


 


 


Urine Bilirubin


  Negative


(NEGATIVE) 


 


 


Urine Urobilinogen


  Normal MG/DL


(0.0-1.0) 


 


 


Urine Leukocyte Esterase


  Negative


(NEGATIVE) 


 


 


Urine RBC


  0-2 /HPF (0 -


0)  H 


 


 


Urine WBC


  0-2 /HPF (0 -


0) 


 


 


Urine Squamous Epithelial


Cells Occasional


/LPF 


 


 


Urine Bacteria


  Occasional


/HPF (NONE) 


 


 


Urine Mucus


  Few /LPF


(NONE/OCC)  H 


 


 


Urine Random Total Protein


  17 MG/DL (<


11.9)  H 


 


 


Urine Creatinine


  266.1 MG/DL


(30.0-125.0)  H 


 


 


Sodium Level


  


  142 MMOL/L


(136-145)


 


Potassium Level


  


  4.3 MMOL/L


(3.5-5.1)


 


Chloride Level


  


  104 MMOL/L


()


 


Carbon Dioxide Level


  


  26 MMOL/L


(21-32)


 


Anion Gap


  


  12 mmol/L


(5-15)


 


Blood Urea Nitrogen


  


  10 mg/dL


(7-18)


 


Creatinine


  


  1.9 MG/DL


(0.55-1.30)  H


 


Estimat Glomerular Filtration


Rate 


  45.3 mL/min


(>60)


 


Glucose Level


  


  103 MG/DL


()


 


Uric Acid


  


  7.3 MG/DL


(2.6-7.2)  H


 


Calcium Level


  


  9.1 MG/DL


(8.5-10.1)








Objective





HEAD AND NECK:  Showed no JVD, has scar of prior tracheostomy that is


closed.


LUNGS:  Clear.


CARDIOVASCULAR:  Shows regular S1 and S2 with no gallop or murmur.


ABDOMEN:  Soft.


EXTREMITIES:  No pitting edema.











Anirudh Crouch MD Mar 4, 2020 14:06

## 2020-03-04 NOTE — GENERAL PROGRESS NOTE
Assessment/Plan


Problem List:  


(1) Chest pain


ICD Codes:  R07.9 - Chest pain, unspecified


SNOMED:  36492554


Qualifiers:  


   Qualified Codes:  R07.9 - Chest pain, unspecified


(2) HTN (hypertension)


ICD Codes:  I10 - Essential (primary) hypertension


SNOMED:  69367553


(3) Renal failure (ARF), acute on chronic


ICD Codes:  N17.9 - Acute kidney failure, unspecified; N18.9 - Chronic kidney 

disease, unspecified


SNOMED:  050185998


(4) CKD (chronic kidney disease)


ICD Codes:  N18.9 - Chronic kidney disease, unspecified


SNOMED:  666516575


(5) Chest pain


ICD Codes:  R07.9 - Chest pain, unspecified


SNOMED:  76634550


(6) Chest pain


ICD Codes:  R07.9 - Chest pain, unspecified


SNOMED:  66454844


(7) Hypertensive urgency


ICD Codes:  I16.0 - Hypertensive urgency


SNOMED:  333837083


(8) Chest wall pain


ICD Codes:  R07.89 - Other chest pain


SNOMED:  250500054


Status:  stable, unchanged


Assessment/Plan:


#Recurrent chest pain


#?chest wall pain


-EKG, troponin so far negative.


-Cardiology consult appreciated.


-stress test negative. 


-Continue imdur 30


-continue asa 81 


-NTG paste, SL prn for chest pain





#JALEEL on CKD3


#CKD3


unclear baseline.


-nephro consult appreciated.


-trend BMP





#HTN


-continue lisinopril





#Hx of gout


-continue allopurinol. 





Time spent on encounter: 35 mins, >50% on counseling, coordination of care. 





Time of note does not reflect the time of encounter.





Subjective


Date patient seen:  Mar 4, 2020


Time patient seen:  11:30


Constitutional:  Denies: no symptoms, chills, diaphoresis, fever, malaise, 

weakness, other


HEENT:  Denies: no symptoms, eye pain, blurred vision, tearing, double vision, 

ear pain, ear discharge, nose pain, nose congestion, throat pain, throat 

swelling, mouth pain, mouth swelling, other


Cardiovascular:  Reports: no symptoms, chest pain, edema, irregular heart rate, 

lightheadedness, palpitations, syncope, other


Respiratory:  Denies: no symptoms, cough, orthopnea, shortness of breath, SOB 

with excertion, SOB at rest, sputum, stridor, wheezing, other


Gastrointestinal/Abdominal:  Denies: no symptoms, abdomen distended, abdominal 

pain, black stools, tarry stools, blood in stool, constipated, diarrhea, 

difficulty swallowing, nausea, poor appetite, poor fluid intake, rectal bleeding

, vomiting, other


Genitourinary:  Denies: no symptoms, burning, discharge, frequency, flank pain, 

hematuria, incontinence, pain, urgency, other


Neurologic/Psychiatric:  Denies: no symptoms, anxiety, depressed, emotional 

problems, headache, numbness, paresthesia, pre-existing deficit, seizure, 

tingling, tremors, weakness, other


Endocrine:  Denies: no symptoms, excessive sweating, flushing, intolerance to 

cold, intolerance to heat, increased hunger, increased thirst, increased urine, 

unexplained weight gain, unexplained weight loss, other


Hematologic/Lymphatic:  Denies: no symptoms, anemia, easy bleeding, easy 

bruising, other


Allergies:  


Coded Allergies:  


     KETOROLAC (Unverified  Adverse Reaction, Intermediate, n/v, 4/7/19)


Uncoded Allergies:  


     TORODOL (Allergy, Unknown, 10/21/16)


Subjective


still having intermittent chest pain, lasting 2-3 minutes, last one this AM.





Objective





Last 24 Hour Vital Signs








  Date Time  Temp Pulse Resp B/P (MAP) Pulse Ox O2 Delivery O2 Flow Rate FiO2


 


3/4/20 12:00  75      


 


3/4/20 12:00 97.9 84 18 159/97 (117) 97   


 


3/4/20 09:00      Room Air  


 


3/4/20 08:54  66  140/92    


 


3/4/20 08:52    140/92    


 


3/4/20 08:00 97.7 74 18 140/92 (108) 99   


 


3/4/20 08:00  74      


 


3/4/20 04:00  74      


 


3/4/20 04:00 97.2 69 16 137/76 (96) 98   


 


3/4/20 00:00 97.9 82 16 131/84 (100) 98   


 


3/4/20 00:00  86      


 


3/3/20 21:00      Room Air  


 


3/3/20 20:00  60      


 


3/3/20 20:00 99.1 72 16 140/86 (104) 98   

















Intake and Output  


 


 3/3/20 3/4/20





 19:00 07:00


 


Intake Total  240 ml


 


Output Total 350 ml 500 ml


 


Balance -350 ml -260 ml


 


  


 


Intake Oral  240 ml


 


Output Urine Total 350 ml 500 ml


 


# Voids  4


 


# Bowel Movements 1 








Laboratory Tests


3/3/20 22:00: 


Urine Color Yellow, Urine Appearance Clear, Urine pH 6, Urine Specific Gravity 

1.020, Urine Protein Negative, Urine Glucose (UA) Negative, Urine Ketones 

Negative, Urine Blood Negative, Urine Nitrite Negative, Urine Bilirubin Negative

, Urine Urobilinogen Normal, Urine Leukocyte Esterase Negative, Urine RBC 0-2H, 

Urine WBC 0-2, Urine Squamous Epithelial Cells Occasional, Urine Bacteria 

Occasional, Urine Mucus FewH, Urine Random Total Protein 17H, Urine Creatinine 

266.1H


3/4/20 05:45: 


Sodium Level 142, Potassium Level 4.3, Chloride Level 104, Carbon Dioxide Level 

26, Anion Gap 12, Blood Urea Nitrogen 10, Creatinine 1.9H, Estimat Glomerular 

Filtration Rate 45.3, Glucose Level 103, Uric Acid 7.3H, Calcium Level 9.1


Height (Feet):  5


Height (Inches):  6.00


Weight (Pounds):  191


General Appearance:  no apparent distress, alert


Neck:  supple


Cardiovascular:  normal rate, regular rhythm


Respiratory/Chest:  lungs clear, normal breath sounds


Abdomen:  non tender, soft


Neurologic:  alert, oriented x 3











Evelyn Ang M.D. Mar 4, 2020 16:03

## 2020-03-04 NOTE — NEPHROLOGY PROGRESS NOTE
Assessment/Plan


Plan


#JALEEL on CKD? chronic compenent likely due to hypertensive nephropathy 


#hypokalemia


#Chest pain r/o ACS


#HTN


# gout


#h/o spinal fusion


# history of craniotomy 


#history of tracheostomy








- BMP pending today


-  UTp/cr


- hold lisinopril


- continue amlodipine 2.5mg daily 


- continue imdur 30mg daily 


- hydralazine prn 


- renal US pending 





- continue allopurinol 100mg daily 





- monitor BP 


- monitor BMP





- 2d echo--> pending 


- stress test per cardiology- > pending





Subjective


ROS Limited/Unobtainable:  No


Constitutional:  Denies: no symptoms, chills, diaphoresis, fever, malaise, 

weakness, other


HEENT:  Denies: no symptoms, eye pain, blurred vision, tearing, double vision, 

ear pain, ear discharge, nose pain, nose congestion, throat pain, throat 

swelling, mouth pain, mouth swelling, other


Genitourinary:  Denies: no symptoms, burning, discharge, frequency, flank pain, 

hematuria, incontinence, pain, urgency, other


Neurologic/Psychiatric:  Denies: no symptoms, anxiety, depressed, emotional 

problems, headache, numbness, paresthesia, pre-existing deficit, seizure, 

tingling, tremors, weakness, other


Subjective


No further chest pain


BMP pending 


BP mostly stable


renal US pending





Objective


Objective





Last 24 Hour Vital Signs








  Date Time  Temp Pulse Resp B/P (MAP) Pulse Ox O2 Delivery O2 Flow Rate FiO2


 


3/4/20 12:00 97.9 84 18 159/97 (117) 97   


 


3/4/20 09:00      Room Air  


 


3/4/20 08:54  66  140/92    


 


3/4/20 08:52    140/92    


 


3/4/20 08:00 97.7 74 18 140/92 (108) 99   


 


3/4/20 08:00  74      


 


3/4/20 04:00  74      


 


3/4/20 04:00 97.2 69 16 137/76 (96) 98   


 


3/4/20 00:00 97.9 82 16 131/84 (100) 98   


 


3/4/20 00:00  86      


 


3/3/20 21:00      Room Air  


 


3/3/20 20:00  60      


 


3/3/20 20:00 99.1 72 16 140/86 (104) 98   


 


3/3/20 16:00  89      


 


3/3/20 16:00 98.2 86 18 138/70 (92) 96   


 


3/3/20 13:59  124  143/94    

















Intake and Output  


 


 3/3/20 3/4/20





 19:00 07:00


 


Intake Total  240 ml


 


Output Total 350 ml 500 ml


 


Balance -350 ml -260 ml


 


  


 


Intake Oral  240 ml


 


Output Urine Total 350 ml 500 ml


 


# Voids  4


 


# Bowel Movements 1 








Laboratory Tests


3/3/20 22:00: 


Urine Color Yellow, Urine Appearance Clear, Urine pH 6, Urine Specific Gravity 

1.020, Urine Protein Negative, Urine Glucose (UA) Negative, Urine Ketones 

Negative, Urine Blood Negative, Urine Nitrite Negative, Urine Bilirubin Negative

, Urine Urobilinogen Normal, Urine Leukocyte Esterase Negative, Urine RBC 0-2H, 

Urine WBC 0-2, Urine Squamous Epithelial Cells Occasional, Urine Bacteria 

Occasional, Urine Mucus FewH, Urine Random Total Protein 17H, Urine Creatinine 

266.1H


3/4/20 05:45: Uric Acid 7.3H


Height (Feet):  5


Height (Inches):  6.00


Weight (Pounds):  191


General Appearance:  WD/WN, no apparent distress


EENT:  PERRL/EOMI, normal ENT inspection


Neck:  non-tender


Cardiovascular:  normal peripheral pulses, normal rate, regular rhythm


Respiratory/Chest:  lungs clear


Abdomen:  normal bowel sounds, soft


Extremities:  normal range of motion, non-tender, normal inspection


Neurologic:  alert, oriented x 3, responsive, normal mood/affect











Magda Weller M.D. Mar 4, 2020 12:33

## 2020-03-04 NOTE — DIAGNOSTIC IMAGING REPORT
Indication:Elevated Bun and Creatinine.

 

Technique: Grayscale and duplex Doppler imaging of the kidneys performed.

 

Comparison: None

 

Findings:

 

The size, contour, and echogenicity of both kidneys are within normal limits. There

is no hydronephrosis.. There is a 1 cm hypodensity in the right kidney probably cyst.

This is not optimally imaged. The right kidney measures 9.5 cm. in length. The left

kidney measures 9.3 cm. in length.

 

The IVC is patent. Urinary bladder is unremarkable. Bilateral ureteral jets noted.

 

IMPRESSION:

 

Negative ultrasound the kidneys. Probable small right renal cyst

## 2020-03-05 VITALS — SYSTOLIC BLOOD PRESSURE: 159 MMHG | DIASTOLIC BLOOD PRESSURE: 100 MMHG

## 2020-03-05 VITALS — DIASTOLIC BLOOD PRESSURE: 101 MMHG | SYSTOLIC BLOOD PRESSURE: 136 MMHG

## 2020-03-05 VITALS — SYSTOLIC BLOOD PRESSURE: 170 MMHG | DIASTOLIC BLOOD PRESSURE: 111 MMHG

## 2020-03-05 VITALS — DIASTOLIC BLOOD PRESSURE: 91 MMHG | SYSTOLIC BLOOD PRESSURE: 133 MMHG

## 2020-03-05 VITALS — SYSTOLIC BLOOD PRESSURE: 140 MMHG | DIASTOLIC BLOOD PRESSURE: 89 MMHG

## 2020-03-05 VITALS — SYSTOLIC BLOOD PRESSURE: 162 MMHG | DIASTOLIC BLOOD PRESSURE: 102 MMHG

## 2020-03-05 LAB
ANION GAP SERPL CALC-SCNC: 9 MMOL/L (ref 5–15)
BUN SERPL-MCNC: 10 MG/DL (ref 7–18)
CALCIUM SERPL-MCNC: 9.2 MG/DL (ref 8.5–10.1)
CHLORIDE SERPL-SCNC: 106 MMOL/L (ref 98–107)
CO2 SERPL-SCNC: 26 MMOL/L (ref 21–32)
CREAT SERPL-MCNC: 1.6 MG/DL (ref 0.55–1.3)
POTASSIUM SERPL-SCNC: 3.8 MMOL/L (ref 3.5–5.1)
SODIUM SERPL-SCNC: 141 MMOL/L (ref 136–145)

## 2020-03-05 RX ADMIN — HEPARIN SODIUM SCH UNITS: 5000 INJECTION INTRAVENOUS; SUBCUTANEOUS at 08:07

## 2020-03-05 RX ADMIN — ALLOPURINOL SCH MG: 100 TABLET ORAL at 08:08

## 2020-03-05 RX ADMIN — DIPHENHYDRAMINE HYDROCHLORIDE PRN MG: 50 INJECTION INTRAMUSCULAR; INTRAVENOUS at 14:13

## 2020-03-05 RX ADMIN — DIPHENHYDRAMINE HYDROCHLORIDE PRN MG: 50 INJECTION INTRAMUSCULAR; INTRAVENOUS at 10:08

## 2020-03-05 RX ADMIN — ASPIRIN 81 MG SCH MG: 81 TABLET ORAL at 08:07

## 2020-03-05 RX ADMIN — DIPHENHYDRAMINE HYDROCHLORIDE PRN MG: 50 INJECTION INTRAMUSCULAR; INTRAVENOUS at 05:23

## 2020-03-05 RX ADMIN — DOCUSATE SODIUM SCH MG: 100 CAPSULE, LIQUID FILLED ORAL at 08:08

## 2020-03-05 RX ADMIN — ISOSORBIDE MONONITRATE SCH MG: 30 TABLET, EXTENDED RELEASE ORAL at 08:08

## 2020-03-05 NOTE — CARDIAC ELECTROPHYSIOLOGY PN
Assessment/Plan


Assessment/Plan


1. Chest pain, ruled out for myocardial infarction.  Cardiac


catheterization in 2017 showed no evidence of coronary artery disease.  


His EKG also does not show any acute ischemic changes.


Dobutamine Echo  was nonischemic





2. Hypertension.  Continue amlodipine 2.5 mg daily, Imdur 30 mg daily and 

p.r.n. hydralazine  





3. Chronic kidney disease   1.6 to 1.7.   


4. Urine tox screen positive for only marijuana.


5. History of craniotomy, on Neurontin.


6. History of CVA with hemiparesis.





OK to DC





Subjective


Subjective


CP. No MI. Cardiac cath at [facility] no CAD in 2017


Had  Echo and stress test. DC planning in progress





Objective





Last 24 Hour Vital Signs








  Date Time  Temp Pulse Resp B/P (MAP) Pulse Ox O2 Delivery O2 Flow Rate FiO2


 


3/5/20 09:00      Room Air  


 


3/5/20 08:08    159/100    


 


3/5/20 08:07  84  159/100    


 


3/5/20 08:00 97.5 84 18 159/100 (119) 98   


 


3/5/20 08:00  79      


 


3/5/20 05:53 97.0       


 


3/5/20 04:00 97.0 80 18 140/89 (106) 96   


 


3/5/20 04:00  79      


 


3/5/20 00:00 97.2 66 18 133/91 (105) 97   


 


3/5/20 00:00  89      


 


3/4/20 21:00      Room Air  


 


3/4/20 20:00  76      


 


3/4/20 20:00 97.7 79 18 150/90 (110) 95   


 


3/4/20 16:00  89      


 


3/4/20 16:00 98.2 77 19 141/88 (105) 96   


 


3/4/20 12:00  75      


 


3/4/20 12:00 97.9 84 18 159/97 (117) 97   

















Intake and Output  


 


 3/4/20 3/5/20





 19:00 07:00


 


Intake Total 1200 ml 


 


Output Total 1300 ml 


 


Balance -100 ml 


 


  


 


Intake Oral 1200 ml 


 


Output Urine Total 1300 ml 


 


# Voids  3








Objective





HEAD AND NECK:  Showed no JVD, has scar of prior tracheostomy that is


closed.


LUNGS:  Clear.


CARDIOVASCULAR:  Shows regular S1 and S2 with no gallop or murmur.


ABDOMEN:  Soft.


EXTREMITIES:  No pitting edema.











Anirudh Crouch MD Mar 5, 2020 10:18

## 2020-03-05 NOTE — NEPHROLOGY PROGRESS NOTE
Assessment/Plan


Plan


#JALEEL on CKD? chronic compenent likely due to hypertensive nephropathy - likely 

with superimposed pre-renal component 


#hypokalemia


#Chest pain r/o ACS


#HTN


# gout


#h/o spinal fusion


# history of craniotomy 


#history of tracheostomy








- start 1/2 ns at 75cc/hr0- for 24 hours


- hold lisinopril


- increase amlodipine to 5mg daily 


- continue imdur 30mg daily 





- hydralazine prn 


- renal US->  


Negative ultrasound the kidneys. Probable small right renal cyst





- continue allopurinol 100mg daily 





- monitor BP 


- monitor BMP





- 2d echo--> pending 


- stress test per cardiology- > pending





Subjective


ROS Limited/Unobtainable:  No


Subjective


No further chest pain


Cr up to 1.9





BP elevated


will increase amlodipine 5mg daily 


renal US:


 


Negative ultrasound the kidneys. Probable small right renal cyst





Objective


Objective





Last 24 Hour Vital Signs








  Date Time  Temp Pulse Resp B/P (MAP) Pulse Ox O2 Delivery O2 Flow Rate FiO2


 


3/5/20 08:08    159/100    


 


3/5/20 08:07  84  159/100    


 


3/5/20 08:00 97.5 84 18 159/100 (119) 98   


 


3/5/20 05:53 97.0       


 


3/5/20 04:00 97.0 80 18 140/89 (106) 96   


 


3/5/20 04:00  79      


 


3/5/20 00:00 97.2 66 18 133/91 (105) 97   


 


3/5/20 00:00  89      


 


3/4/20 21:00      Room Air  


 


3/4/20 20:00  76      


 


3/4/20 20:00 97.7 79 18 150/90 (110) 95   


 


3/4/20 16:00  89      


 


3/4/20 16:00 98.2 77 19 141/88 (105) 96   


 


3/4/20 12:00  75      


 


3/4/20 12:00 97.9 84 18 159/97 (117) 97   

















Intake and Output  


 


 3/4/20 3/5/20





 19:00 07:00


 


Intake Total 1200 ml 


 


Output Total 1300 ml 


 


Balance -100 ml 


 


  


 


Intake Oral 1200 ml 


 


Output Urine Total 1300 ml 


 


# Voids  3








Height (Feet):  5


Height (Inches):  6.00


Weight (Pounds):  191











Magda Weller M.D. Mar 5, 2020 09:39

## 2020-03-07 ENCOUNTER — HOSPITAL ENCOUNTER (EMERGENCY)
Dept: HOSPITAL 72 - EMR | Age: 55
Discharge: HOME | End: 2020-03-07
Payer: SELF-PAY

## 2020-03-07 VITALS — HEIGHT: 66 IN | BODY MASS INDEX: 30.05 KG/M2 | WEIGHT: 187 LBS

## 2020-03-07 VITALS — SYSTOLIC BLOOD PRESSURE: 139 MMHG | DIASTOLIC BLOOD PRESSURE: 90 MMHG

## 2020-03-07 VITALS — SYSTOLIC BLOOD PRESSURE: 143 MMHG | DIASTOLIC BLOOD PRESSURE: 92 MMHG

## 2020-03-07 DIAGNOSIS — N18.9: ICD-10-CM

## 2020-03-07 DIAGNOSIS — Z88.8: ICD-10-CM

## 2020-03-07 DIAGNOSIS — E11.42: ICD-10-CM

## 2020-03-07 DIAGNOSIS — G81.94: ICD-10-CM

## 2020-03-07 DIAGNOSIS — Z98.1: ICD-10-CM

## 2020-03-07 DIAGNOSIS — G89.29: Primary | ICD-10-CM

## 2020-03-07 DIAGNOSIS — M54.12: ICD-10-CM

## 2020-03-07 DIAGNOSIS — I12.9: ICD-10-CM

## 2020-03-07 LAB
ADD MANUAL DIFF: NO
ALBUMIN SERPL-MCNC: 4.1 G/DL (ref 3.4–5)
ALBUMIN/GLOB SERPL: 1.1 {RATIO} (ref 1–2.7)
ALP SERPL-CCNC: 44 U/L (ref 46–116)
ALT SERPL-CCNC: 15 U/L (ref 12–78)
ANION GAP SERPL CALC-SCNC: 12 MMOL/L (ref 5–15)
AST SERPL-CCNC: 21 U/L (ref 15–37)
BASOPHILS NFR BLD AUTO: 1.1 % (ref 0–2)
BILIRUB SERPL-MCNC: 0.4 MG/DL (ref 0.2–1)
BUN SERPL-MCNC: 18 MG/DL (ref 7–18)
CALCIUM SERPL-MCNC: 9.4 MG/DL (ref 8.5–10.1)
CHLORIDE SERPL-SCNC: 107 MMOL/L (ref 98–107)
CO2 SERPL-SCNC: 25 MMOL/L (ref 21–32)
CREAT SERPL-MCNC: 1.8 MG/DL (ref 0.55–1.3)
EOSINOPHIL NFR BLD AUTO: 10.2 % (ref 0–3)
ERYTHROCYTE [DISTWIDTH] IN BLOOD BY AUTOMATED COUNT: 13.1 % (ref 11.6–14.8)
GLOBULIN SER-MCNC: 3.7 G/DL
HCT VFR BLD CALC: 43.4 % (ref 42–52)
HGB BLD-MCNC: 14.4 G/DL (ref 14.2–18)
LYMPHOCYTES NFR BLD AUTO: 16.1 % (ref 20–45)
MCV RBC AUTO: 93 FL (ref 80–99)
MONOCYTES NFR BLD AUTO: 6.6 % (ref 1–10)
NEUTROPHILS NFR BLD AUTO: 66 % (ref 45–75)
PLATELET # BLD: 198 K/UL (ref 150–450)
POTASSIUM SERPL-SCNC: 3.9 MMOL/L (ref 3.5–5.1)
RBC # BLD AUTO: 4.68 M/UL (ref 4.7–6.1)
SODIUM SERPL-SCNC: 143 MMOL/L (ref 136–145)
WBC # BLD AUTO: 6.9 K/UL (ref 4.8–10.8)

## 2020-03-07 PROCEDURE — 93005 ELECTROCARDIOGRAM TRACING: CPT

## 2020-03-07 PROCEDURE — 96374 THER/PROPH/DIAG INJ IV PUSH: CPT

## 2020-03-07 PROCEDURE — 85025 COMPLETE CBC W/AUTO DIFF WBC: CPT

## 2020-03-07 PROCEDURE — 36415 COLL VENOUS BLD VENIPUNCTURE: CPT

## 2020-03-07 PROCEDURE — 72052 X-RAY EXAM NECK SPINE 6/>VWS: CPT

## 2020-03-07 PROCEDURE — 80053 COMPREHEN METABOLIC PANEL: CPT

## 2020-03-07 PROCEDURE — 71045 X-RAY EXAM CHEST 1 VIEW: CPT

## 2020-03-07 PROCEDURE — 99284 EMERGENCY DEPT VISIT MOD MDM: CPT

## 2020-03-07 PROCEDURE — 84484 ASSAY OF TROPONIN QUANT: CPT

## 2020-03-07 NOTE — EMERGENCY ROOM REPORT
History of Present Illness


General


Chief Complaint:  Chest Pain


Source:  Patient





Present Illness


HPI





Disclaimer: Please note that this report is being documented using DRAGON 

technology. This can lead to erroneous entry secondary to incorrect 

interpretation by the dictating instrument.





HPI: 54-year-old male history of CKD, CVA, hemiparesis, presents for evaluation 

of chest pain.  He was discharged from this facility 2 days ago after admission 

with similar complaints.  He has been complaining of a left-sided sharp pain 

that is worse with bending and twisting motion without associated shortness of 

breath for some time.  He was admitted and ruled out for MI.  Review of chart 

show cardiac catheterization 2017 without any evidence of CAD.  Scheduled to 

see his PMD in 2 days to schedule outpatient cardiology and further testing as 

needed.  He was also complaining that he pulled something in his upper back.  

He turned his neck shoulder to the right and felt a "pop" in the left side of 

his neck.  Notes worsening pain that radiates from the top of the neck down the 

left arm that sometimes down the left leg as well.  States he has a history of 

cervical fusion from C3-C4 and C4-C5.  Denies any numbness, tingling, weakness 

in the upper extremities or lower extremities at this time.  Denies any urinary 

retention or fecal incontinence.


 


PMH: Hypertension, CKD, CVA


 


PSH: Cervical fusion, craniectomy


 


Allergies: Toradol


 


Social Hx: Denies


Allergies:  


Coded Allergies:  


     KETOROLAC (Unverified  Adverse Reaction, Intermediate, n/v, 4/7/19)


Uncoded Allergies:  


     TORODOL (Allergy, Unknown, 10/21/16)





Nursing Documentation-PMH


Hx Hypertension:  Yes


Hx Cancer:  No


Hx Gastrointestinal Problems:  Yes


Hx Neurological Problems:  Yes - Spinal Fusion and stroke 2011(Lt. side deficit)

, Neuralgia & neuritis


Hx Cerebrovascular Accident:  Yes - CVA w/ Lt side weaknedss 2011,2017


Hx Transient Ischemic Attacks:  Yes


Hx Peripheral Neuropathy:  Yes


Hx Neurologic Surgery:  Yes - Craniotomy at [facility]/ Intracranial hemorrage





Review of Systems


All Other Systems:  negative except mentioned in HPI





Physical Exam





Vital Signs








  Date Time  Temp Pulse Resp B/P (MAP) Pulse Ox O2 Delivery O2 Flow Rate FiO2


 


3/7/20 12:21 98.1 98 18 153/94 (113) 97 Room Air  





 





General: Awake and alert, no acute distress


HEENT: NC/AT. EOMI. 


Neck: Supple, trachea midline


Chest Wall: Tenderness over the sternum, right upper chest wall, left upper 

chest wall without deformity or crepitus


Cardiovascular: RRR.  S1 and S2 normal.  No murmur appreciated


Resp: Normal work of breathing. No cough, wheezing or crackles appreciated


Abdomen: Abdomen is soft, nondistended.  Nontender


Skin: Intact.  No abrasions, laceration or rash over the exposed skin


MSK: Normal tone and bulk. Moving all extremities.  No obvious deformity.


Neuro: Awake and alert.  Mentating appropriately.  


Back/Spine: No midline tenderness in the cervical, thoracic or lumbosacral 

spine.  Tenderness palpation of the lower cervical spine and upper thoracic 

paraspinal region.  No midline cervical tenderness.





Medical Decision Making


Diagnostic Impression:  


 Primary Impression:  


 Chronic pain


 Additional Impression:  


 Cervical radiculopathy


ER Course


This is a 54-year-old male presenting for evaluation of chest and neck pain.  

Patient recently admitted and discharged 2 days ago for a chest discomfort.  

Last cardiac angiogram was 2017 showed no coronary artery disease.  He is 

currently resting comfortably in his chest pain is reproducible.  Will repeat 

EKG, cardiac enzymes and chest x-ray and will also obtain a x-ray of the 

cervical spine to check his hardware placement.  He appears to have suffered a 

cervical strain though may also have muscle spasm, hardware dysfunction, occult 

fracture, dislocation that needs further work-up.





Laboratory Tests








Test


  3/7/20


12:41


 


White Blood Count


  6.9 K/UL


(4.8-10.8)


 


Red Blood Count


  4.68 M/UL


(4.70-6.10)  L


 


Hemoglobin


  14.4 G/DL


(14.2-18.0)


 


Hematocrit


  43.4 %


(42.0-52.0)


 


Mean Corpuscular Volume 93 FL (80-99)  


 


Mean Corpuscular Hemoglobin


  30.9 PG


(27.0-31.0)


 


Mean Corpuscular Hemoglobin


Concent 33.2 G/DL


(32.0-36.0)


 


Red Cell Distribution Width


  13.1 %


(11.6-14.8)


 


Platelet Count


  198 K/UL


(150-450)


 


Mean Platelet Volume


  8.7 FL


(6.5-10.1)


 


Neutrophils (%) (Auto)


  66.0 %


(45.0-75.0)


 


Lymphocytes (%) (Auto)


  16.1 %


(20.0-45.0)  L


 


Monocytes (%) (Auto)


  6.6 %


(1.0-10.0)


 


Eosinophils (%) (Auto)


  10.2 %


(0.0-3.0)  H


 


Basophils (%) (Auto)


  1.1 %


(0.0-2.0)


 


Sodium Level


  143 MMOL/L


(136-145)


 


Potassium Level


  3.9 MMOL/L


(3.5-5.1)


 


Chloride Level


  107 MMOL/L


()


 


Carbon Dioxide Level


  25 MMOL/L


(21-32)


 


Anion Gap


  12 mmol/L


(5-15)


 


Blood Urea Nitrogen


  18 mg/dL


(7-18)


 


Creatinine


  1.8 MG/DL


(0.55-1.30)  H


 


Estimate Glomerular


Filtration Rate 48.2 mL/min


(>60)


 


Glucose Level


  132 MG/DL


()  H


 


Calcium Level


  9.4 MG/DL


(8.5-10.1)


 


Total Bilirubin


  0.4 MG/DL


(0.2-1.0)


 


Aspartate Amino Transferase


(AST) 21 U/L (15-37)


 


 


Alanine Aminotransferase (ALT)


  15 U/L (12-78)


 


 


Alkaline Phosphatase


  44 U/L


()  L


 


Troponin I


  0.000 ng/mL


(0.000-0.056)


 


Total Protein


  7.8 G/DL


(6.4-8.2)


 


Albumin


  4.1 G/DL


(3.4-5.0)


 


Globulin 3.7 g/dL  


 


Albumin/Globulin Ratio 1.1 (1.0-2.7)  








EKG Diagnostic Results


EKG Time:  12:37


Rate:  normal


Rhythm:  NSR


ST Segments:  no acute changes


Other Impression


Sinus rhythm, mild left axis deviation.  No ST segment changes.





Rhythm Strip Diag. Results


Rhythm Strip Time:  12:37


EP Interpretation:  yes


Rate:  80s


Rhythm:  NSR, no PVC's, no ectopy





Chest X-Ray Diagnostic Results


Chest X-Ray Diagnostic Results :  


   Chest X-Ray Ordered:  Yes


   # of Views/Limited/Complete:  1 View


   Indication:  Chest Pain


   Interpretation:  no consolidation, no effusion, no pneumothorax, no acute 

cardiopulmonary disease


   Impression:  No acute disease


   Electronically Signed by:  Electronically signed by Dr. Jay Knight


Reevaluation Time:  14:51





Last Vital Signs








  Date Time  Temp Pulse Resp B/P (MAP) Pulse Ox O2 Delivery O2 Flow Rate FiO2


 


3/7/20 12:21 98.1 98 18 153/94 (113) 97 Room Air  








Reevaluation Impression


Cardiac enzymes are unremarkable.  Chest x-ray unremarkable.  EKG shows left 

axis deviation and mild criteria for LVH consistent with his last EKG on 3/2/

2020.  This is unchanged from his previous EKG.  Patient is treated with Robaxin

, lidocaine patch and pain medication with mild improvement in his symptoms.  

He also has a history of cervical radiculopathy I believe this is the cause of 

his neck strain today.  He will be treated with a Medrol Dosepak, continued 

Robaxin, NSAIDs and lidocaine patch.  Discussed with his PMD, Dr. Lam, who 

recommends with outpatient follow-up for this patient given his recent 

admission.  He will follow-up in his office later this week.  Discussed reasons 

to return to the emergency department.  He understands and agrees with the 

treatment plan.


Disposition:  HOME, SELF-CARE


Condition:  Stable


Scripts


Methylprednisolone (Methylprednisolone*) 4MG Dspk


4 MG ORAL AS DIRECTED for 6 Days, #21 EA 0 Refills


   Day 1: Two tablets before breakfast, one after lunch, one after


   dinner, and two at bedtime. If started late in the day, take all six


   tablets at once or divide into two or three doses, unless otherwise


   directed by prescriber.


   Day 2: One tablet before breakfast, one after lunch, one after dinner,


   and two at bedtime


   Day 3: One tablet before breakfast, one after lunch, one after dinner,


   and one at bedtime


   Day 4: One tablet before breakfast, one after lunch, and one at


   bedtime


   Day 5: One tablet before breakfast and one at bedtime


   Day 6: One tablet before breakfast


   Prov: Jay Knight MD         3/7/20 


Lidocaine Patch* (Lidoderm Patch*) 1 Each Adh..patch


1 PATCH TOPIC DAILY, #10 PATCH 0 Refills


   Patch(es) may remain in place for up to 12 hours in any 24-hour


   period.


   Prov: Jay Knight MD         3/7/20 


Ibuprofen* (MOTRIN*) 600 Mg Tablet


600 MG ORAL Q8H PRN for For Pain, #30 TAB 0 Refills


   Prov: Jay Knight MD         3/7/20 


Methocarbamol* (ROBAXIN-750*) 750 Mg Tablet


750 MG PO QID, #28 TAB 0 Refills


   Prov: Jay Knight MD         3/7/20


Referrals:  


Tank Lam MD (PCP)











Jay Knight MD Mar 7, 2020 13:01

## 2020-03-07 NOTE — DIAGNOSTIC IMAGING REPORT
EXAM:

  XR Cervical Spine, 2 or 3 Views

 

CLINICAL HISTORY:

  PAIN

 

TECHNIQUE:

  Frontal and lateral views of the cervical spine.

 

COMPARISON:

  No relevant prior studies available.

 

FINDINGS:

  Limitations:  Limited due to overlapping of structures.  No apparent 

fracture or malalignment.

  Vertebrae:  See above.

  Disc spaces:  Degenerative changes.  Cervical fusion plate.

  Soft tissues:  No radiodense foreign body.  Earrings.

 

IMPRESSION:     

  Limited due to overlapping of structures.  No apparent fracture or 

malalignment.  CT can further assess as warranted

## 2020-03-07 NOTE — DIAGNOSTIC IMAGING REPORT
EXAM:

  XR Chest, 1 View

 

CLINICAL HISTORY:

  CP

 

TECHNIQUE:

  Frontal view of the chest.

 

COMPARISON:

  No relevant prior studies available.

 

FINDINGS:

  Lungs:  Reduced lung volumes.  No confluent consolidation.

  Pleural space:  Unremarkable.  No pneumothorax.

  Heart: Accentuation of cardiac silhouette.

  Mediastinum:  Unremarkable.

  Bones/joints:  No acute fracture.

 

IMPRESSION:     

  Reduced lung volumes.  No confluent consolidation.

## 2020-03-15 ENCOUNTER — HOSPITAL ENCOUNTER (EMERGENCY)
Dept: HOSPITAL 72 - EMR | Age: 55
Discharge: HOME | End: 2020-03-15
Payer: SELF-PAY

## 2020-03-15 VITALS — HEIGHT: 66 IN | WEIGHT: 180 LBS | BODY MASS INDEX: 28.93 KG/M2

## 2020-03-15 VITALS — SYSTOLIC BLOOD PRESSURE: 170 MMHG | DIASTOLIC BLOOD PRESSURE: 98 MMHG

## 2020-03-15 DIAGNOSIS — Z98.1: ICD-10-CM

## 2020-03-15 DIAGNOSIS — G81.94: ICD-10-CM

## 2020-03-15 DIAGNOSIS — R07.9: Primary | ICD-10-CM

## 2020-03-15 DIAGNOSIS — I10: ICD-10-CM

## 2020-03-15 DIAGNOSIS — G62.9: ICD-10-CM

## 2020-03-15 DIAGNOSIS — Z88.6: ICD-10-CM

## 2020-03-15 PROCEDURE — 99283 EMERGENCY DEPT VISIT LOW MDM: CPT

## 2020-03-15 NOTE — EMERGENCY ROOM REPORT
History of Present Illness


General


Chief Complaint:  Chest Pain


Source:  Patient





Present Illness


HPI


Patient presents with complaints of chest pain


Reports that he had received injection for his neck pain several days ago and 

feels pain down to the chest on the left upper arm


Denies any visual changes denies any shortness of breath denies any pleurisy


Denies any vomiting denies any abdominal pain


COVID-19 risk:Travel to affect:  No


Allergies:  


Coded Allergies:  


     KETOROLAC (Unverified  Adverse Reaction, Intermediate, n/v, 4/7/19)


Uncoded Allergies:  


     TORODOL (Allergy, Unknown, 10/21/16)





Patient History


Past Medical History:  see triage record


Reviewed Nursing Documentation:  PMH: Agreed; PSxH: Agreed





Nursing Documentation-PMH


Hx Hypertension:  Yes


Hx Cancer:  No


Hx Gastrointestinal Problems:  Yes


Hx Neurological Problems:  Yes - Spinal Fusion and stroke 2011(Lt. side deficit)

, Neuralgia & neuritis


Hx Cerebrovascular Accident:  Yes - CVA w/ Lt side weaknedss 2011,2017


Hx Transient Ischemic Attacks:  Yes


Hx Peripheral Neuropathy:  Yes


Hx Neurologic Surgery:  Yes - Craniotomy at [facility]/ Intracranial hemorrage





Review of Systems


All Other Systems:  negative except mentioned in HPI





Physical Exam





Vital Signs








  Date Time  Temp Pulse Resp B/P (MAP) Pulse Ox O2 Delivery O2 Flow Rate FiO2


 


3/15/20 21:16 98.2 90 14 170/98 (122) 96 Room Air  








Sp02 EP Interpretation:  reviewed, normal


General Appearance:  well appearing, no apparent distress


Head:  normocephalic, atraumatic


Eyes:  bilateral eye PERRL, bilateral eye EOMI


ENT:  hearing grossly normal, normal pharynx, TMs + canals normal, uvula midline


Neck:  full range of motion, supple, no meningismus, no bony tend


Respiratory:  lungs clear, normal breath sounds, no rhonchi, no respiratory 

distress, no retraction, no accessory muscle use


Cardiovascular #1:  normal peripheral pulses, regular rate, rhythm, no edema, 

no gallop, no JVD, no murmur


Gastrointestinal:  normal bowel sounds, non tender, soft, no mass, no 

organomegaly, non-distended, no guarding, no hernia, no pulsatile mass, no 

rebound


Musculoskeletal:  normal inspection


Neurologic:  motor strength/tone normal, CNs III-XII nml as tested, oriented x3

, sensory intact, responsive


Psychiatric:  mood/affect normal


Skin:  no rash


Lymphatic:  normal inspection, no adenopathy





Medical Decision Making


Diagnostic Impression:  


 Primary Impression:  


 Chest pain


ER Course


Patient is a fairly complex patient with multiple differential to consideration 

including but not limited to cardiac cardiopulmonary and vascular emergencies





Patient's EKG is performed and is identical to his previous EKG patient has had 

fairly extensive inpatient care recently and repeat visit with further work-up


I do not feel patient met criteria for repeating this work-up


Is encouraged to follow closely with his primary physician


EKG Diagnostic Results


Rate:  normal


Rhythm:  NSR


ST Segments:  no acute changes





Rhythm Strip Diag. Results


EP Interpretation:  yes


Rate:  77


Rhythm:  NSR, no PVC's, no ectopy





Last Vital Signs








  Date Time  Temp Pulse Resp B/P (MAP) Pulse Ox O2 Delivery O2 Flow Rate FiO2


 


3/15/20 21:45 98.2  14 170/98 96 Room Air  


 


3/15/20 21:30  90      








Status:  improved


Disposition:  HOME, SELF-CARE


Condition:  Stable


Patient Instructions:  Nonspecific Chest Pain





Additional Instructions:  


Patient is provided with the discharge instructions notified to follow up with 

primary doctor in the next 2-3 days otherwise return to the er with any 

worsening symptoms.


Please note that this report is being documented using DRAGON technology.  This 

can lead to erroneous entry secondary to incorrect interpretation by the 

dictating instrument.











Viral Yates DO Mar 15, 2020 22:56

## 2020-03-15 NOTE — NUR
ED Nurse Note:



ER DISCHARGE NOTE:

Patient is cleared to be discharged per ERMD, pt is aox4, on room air, with 
stable vital signs. pt was given dc and prescription instructions, pt was able 
to verbalize understanding, pt id band  removed without complications. pt is 
able to ambulate with steady gait. pt took all belongings.

## 2020-05-24 ENCOUNTER — HOSPITAL ENCOUNTER (EMERGENCY)
Dept: HOSPITAL 15 - ER | Age: 55
Discharge: HOME | End: 2020-05-24
Payer: SELF-PAY

## 2020-05-24 VITALS — DIASTOLIC BLOOD PRESSURE: 103 MMHG | SYSTOLIC BLOOD PRESSURE: 122 MMHG

## 2020-05-24 VITALS — HEIGHT: 66 IN | BODY MASS INDEX: 29.25 KG/M2 | WEIGHT: 182 LBS

## 2020-05-24 DIAGNOSIS — N18.3: ICD-10-CM

## 2020-05-24 DIAGNOSIS — I12.9: ICD-10-CM

## 2020-05-24 DIAGNOSIS — I69.354: ICD-10-CM

## 2020-05-24 DIAGNOSIS — M47.816: ICD-10-CM

## 2020-05-24 DIAGNOSIS — F12.10: ICD-10-CM

## 2020-05-24 DIAGNOSIS — R07.89: Primary | ICD-10-CM

## 2020-05-24 DIAGNOSIS — E78.5: ICD-10-CM

## 2020-05-24 LAB
ALBUMIN SERPL-MCNC: 4.6 G/DL (ref 3.4–5)
ALP SERPL-CCNC: 51 U/L (ref 45–117)
ALT SERPL-CCNC: 22 U/L (ref 16–61)
ANION GAP SERPL CALCULATED.3IONS-SCNC: 9 MMOL/L (ref 5–15)
APTT PPP: 28.6 SEC (ref 23.64–32.05)
BILIRUB SERPL-MCNC: 1.4 MG/DL (ref 0.2–1)
BUN SERPL-MCNC: 13 MG/DL (ref 7–18)
BUN/CREAT SERPL: 7.5
CALCIUM SERPL-MCNC: 8.9 MG/DL (ref 8.5–10.1)
CHLORIDE SERPL-SCNC: 108 MMOL/L (ref 98–107)
CO2 SERPL-SCNC: 21 MMOL/L (ref 21–32)
GLUCOSE SERPL-MCNC: 136 MG/DL (ref 74–106)
HCT VFR BLD AUTO: 48.9 % (ref 41–53)
HGB BLD-MCNC: 15.9 G/DL (ref 13.5–17.5)
INR PPP: 1.16 (ref 0.9–1.15)
MCH RBC QN AUTO: 30.2 PG (ref 28–32)
MCV RBC AUTO: 93 FL (ref 80–100)
NRBC BLD QL AUTO: 0 %
POTASSIUM SERPL-SCNC: 4.1 MMOL/L (ref 3.5–5.1)
PROT SERPL-MCNC: 8.1 G/DL (ref 6.4–8.2)
SODIUM SERPL-SCNC: 138 MMOL/L (ref 136–145)
YEAST # UR AUTO: (no result) /HPF

## 2020-05-24 PROCEDURE — 93005 ELECTROCARDIOGRAM TRACING: CPT

## 2020-05-24 PROCEDURE — 96375 TX/PRO/DX INJ NEW DRUG ADDON: CPT

## 2020-05-24 PROCEDURE — 80053 COMPREHEN METABOLIC PANEL: CPT

## 2020-05-24 PROCEDURE — 85025 COMPLETE CBC W/AUTO DIFF WBC: CPT

## 2020-05-24 PROCEDURE — 72100 X-RAY EXAM L-S SPINE 2/3 VWS: CPT

## 2020-05-24 PROCEDURE — 96376 TX/PRO/DX INJ SAME DRUG ADON: CPT

## 2020-05-24 PROCEDURE — 85730 THROMBOPLASTIN TIME PARTIAL: CPT

## 2020-05-24 PROCEDURE — 84484 ASSAY OF TROPONIN QUANT: CPT

## 2020-05-24 PROCEDURE — 36415 COLL VENOUS BLD VENIPUNCTURE: CPT

## 2020-05-24 PROCEDURE — 71046 X-RAY EXAM CHEST 2 VIEWS: CPT

## 2020-05-24 PROCEDURE — 85610 PROTHROMBIN TIME: CPT

## 2020-05-24 PROCEDURE — 81001 URINALYSIS AUTO W/SCOPE: CPT

## 2020-05-24 PROCEDURE — 96374 THER/PROPH/DIAG INJ IV PUSH: CPT

## 2020-05-24 PROCEDURE — 99285 EMERGENCY DEPT VISIT HI MDM: CPT

## 2020-05-30 ENCOUNTER — HOSPITAL ENCOUNTER (INPATIENT)
Dept: HOSPITAL 15 - ER | Age: 55
LOS: 2 days | Discharge: HOME | DRG: 280 | End: 2020-06-01
Attending: INTERNAL MEDICINE | Admitting: INTERNAL MEDICINE
Payer: MEDICARE

## 2020-05-30 VITALS — BODY MASS INDEX: 32.06 KG/M2 | WEIGHT: 199.52 LBS | HEIGHT: 66 IN

## 2020-05-30 DIAGNOSIS — N39.0: ICD-10-CM

## 2020-05-30 DIAGNOSIS — I69.354: ICD-10-CM

## 2020-05-30 DIAGNOSIS — G89.4: ICD-10-CM

## 2020-05-30 DIAGNOSIS — I13.10: ICD-10-CM

## 2020-05-30 DIAGNOSIS — E78.5: ICD-10-CM

## 2020-05-30 DIAGNOSIS — Z79.02: ICD-10-CM

## 2020-05-30 DIAGNOSIS — N18.4: ICD-10-CM

## 2020-05-30 DIAGNOSIS — E79.0: ICD-10-CM

## 2020-05-30 DIAGNOSIS — I73.9: ICD-10-CM

## 2020-05-30 DIAGNOSIS — E83.39: ICD-10-CM

## 2020-05-30 DIAGNOSIS — Z82.49: ICD-10-CM

## 2020-05-30 DIAGNOSIS — N17.0: ICD-10-CM

## 2020-05-30 DIAGNOSIS — I21.4: Primary | ICD-10-CM

## 2020-05-30 DIAGNOSIS — F12.90: ICD-10-CM

## 2020-05-30 DIAGNOSIS — Z79.899: ICD-10-CM

## 2020-05-30 DIAGNOSIS — D63.8: ICD-10-CM

## 2020-05-30 LAB
ALBUMIN SERPL-MCNC: 4.1 G/DL (ref 3.4–5)
ALBUMIN SERPL-MCNC: 4.2 G/DL (ref 3.4–5)
ALCOHOL, URINE: < 3 MG/DL (ref 0–10)
ALP SERPL-CCNC: 44 U/L (ref 45–117)
ALP SERPL-CCNC: 46 U/L (ref 45–117)
ALT SERPL-CCNC: 21 U/L (ref 16–61)
ALT SERPL-CCNC: 23 U/L (ref 16–61)
AMPHETAMINES UR QL SCN: NEGATIVE
ANION GAP SERPL CALCULATED.3IONS-SCNC: 5 MMOL/L (ref 5–15)
ANION GAP SERPL CALCULATED.3IONS-SCNC: 6 MMOL/L (ref 5–15)
APTT PPP: 28.1 SEC (ref 23.64–32.05)
APTT PPP: 28.5 SEC (ref 23.64–32.05)
BARBITURATES UR QL SCN: NEGATIVE
BENZODIAZ UR QL SCN: NEGATIVE
BILIRUB SERPL-MCNC: 0.7 MG/DL (ref 0.2–1)
BILIRUB SERPL-MCNC: 0.9 MG/DL (ref 0.2–1)
BUN SERPL-MCNC: 28 MG/DL (ref 7–18)
BUN SERPL-MCNC: 30 MG/DL (ref 7–18)
BUN/CREAT SERPL: 10.8
BUN/CREAT SERPL: 11.2
BZE UR QL SCN: NEGATIVE
CALCIUM SERPL-MCNC: 8.5 MG/DL (ref 8.5–10.1)
CALCIUM SERPL-MCNC: 8.5 MG/DL (ref 8.5–10.1)
CANNABINOIDS UR QL SCN: POSITIVE
CHLORIDE SERPL-SCNC: 110 MMOL/L (ref 98–107)
CHLORIDE SERPL-SCNC: 110 MMOL/L (ref 98–107)
CO2 SERPL-SCNC: 22 MMOL/L (ref 21–32)
CO2 SERPL-SCNC: 25 MMOL/L (ref 21–32)
GLUCOSE SERPL-MCNC: 102 MG/DL (ref 74–106)
GLUCOSE SERPL-MCNC: 115 MG/DL (ref 74–106)
HCT VFR BLD AUTO: 41.9 % (ref 41–53)
HCT VFR BLD AUTO: 42.4 % (ref 41–53)
HGB BLD-MCNC: 14 G/DL (ref 13.5–17.5)
HGB BLD-MCNC: 14 G/DL (ref 13.5–17.5)
INR PPP: 1.13 (ref 0.9–1.15)
INR PPP: 1.13 (ref 0.9–1.15)
MCH RBC QN AUTO: 30.8 PG (ref 28–32)
MCH RBC QN AUTO: 31 PG (ref 28–32)
MCV RBC AUTO: 92.2 FL (ref 80–100)
MCV RBC AUTO: 93.6 FL (ref 80–100)
NRBC BLD QL AUTO: 0 %
NRBC BLD QL AUTO: 0 %
OPIATES UR QL SCN: NEGATIVE
PCP UR QL SCN: NEGATIVE
POTASSIUM SERPL-SCNC: 3.9 MMOL/L (ref 3.5–5.1)
POTASSIUM SERPL-SCNC: 4.1 MMOL/L (ref 3.5–5.1)
PROT SERPL-MCNC: 7.3 G/DL (ref 6.4–8.2)
PROT SERPL-MCNC: 7.5 G/DL (ref 6.4–8.2)
SODIUM SERPL-SCNC: 138 MMOL/L (ref 136–145)
SODIUM SERPL-SCNC: 140 MMOL/L (ref 136–145)

## 2020-05-30 PROCEDURE — 85610 PROTHROMBIN TIME: CPT

## 2020-05-30 PROCEDURE — 80307 DRUG TEST PRSMV CHEM ANLYZR: CPT

## 2020-05-30 PROCEDURE — 76775 US EXAM ABDO BACK WALL LIM: CPT

## 2020-05-30 PROCEDURE — 85379 FIBRIN DEGRADATION QUANT: CPT

## 2020-05-30 PROCEDURE — 86141 C-REACTIVE PROTEIN HS: CPT

## 2020-05-30 PROCEDURE — 83735 ASSAY OF MAGNESIUM: CPT

## 2020-05-30 PROCEDURE — 84443 ASSAY THYROID STIM HORMONE: CPT

## 2020-05-30 PROCEDURE — 36415 COLL VENOUS BLD VENIPUNCTURE: CPT

## 2020-05-30 PROCEDURE — 82962 GLUCOSE BLOOD TEST: CPT

## 2020-05-30 PROCEDURE — 83880 ASSAY OF NATRIURETIC PEPTIDE: CPT

## 2020-05-30 PROCEDURE — 93925 LOWER EXTREMITY STUDY: CPT

## 2020-05-30 PROCEDURE — 87086 URINE CULTURE/COLONY COUNT: CPT

## 2020-05-30 PROCEDURE — 84300 ASSAY OF URINE SODIUM: CPT

## 2020-05-30 PROCEDURE — 85730 THROMBOPLASTIN TIME PARTIAL: CPT

## 2020-05-30 PROCEDURE — 85652 RBC SED RATE AUTOMATED: CPT

## 2020-05-30 PROCEDURE — 80048 BASIC METABOLIC PNL TOTAL CA: CPT

## 2020-05-30 PROCEDURE — 84100 ASSAY OF PHOSPHORUS: CPT

## 2020-05-30 PROCEDURE — 99291 CRITICAL CARE FIRST HOUR: CPT

## 2020-05-30 PROCEDURE — 84484 ASSAY OF TROPONIN QUANT: CPT

## 2020-05-30 PROCEDURE — 93005 ELECTROCARDIOGRAM TRACING: CPT

## 2020-05-30 PROCEDURE — 80053 COMPREHEN METABOLIC PANEL: CPT

## 2020-05-30 PROCEDURE — 71046 X-RAY EXAM CHEST 2 VIEWS: CPT

## 2020-05-30 PROCEDURE — 93971 EXTREMITY STUDY: CPT

## 2020-05-30 PROCEDURE — 93306 TTE W/DOPPLER COMPLETE: CPT

## 2020-05-30 PROCEDURE — 84156 ASSAY OF PROTEIN URINE: CPT

## 2020-05-30 PROCEDURE — 82570 ASSAY OF URINE CREATININE: CPT

## 2020-05-30 PROCEDURE — 82550 ASSAY OF CK (CPK): CPT

## 2020-05-30 PROCEDURE — 70450 CT HEAD/BRAIN W/O DYE: CPT

## 2020-05-30 PROCEDURE — 87081 CULTURE SCREEN ONLY: CPT

## 2020-05-30 PROCEDURE — 85025 COMPLETE CBC W/AUTO DIFF WBC: CPT

## 2020-05-30 PROCEDURE — 80061 LIPID PANEL: CPT

## 2020-05-30 PROCEDURE — 84550 ASSAY OF BLOOD/URIC ACID: CPT

## 2020-05-30 PROCEDURE — 81001 URINALYSIS AUTO W/SCOPE: CPT

## 2020-05-30 RX ADMIN — SODIUM CHLORIDE SCH MLS/HR: 0.9 INJECTION, SOLUTION INTRAVENOUS at 20:38

## 2020-05-30 RX ADMIN — ATORVASTATIN CALCIUM SCH MG: 20 TABLET, FILM COATED ORAL at 22:42

## 2020-05-30 RX ADMIN — FAMOTIDINE SCH MG: 20 TABLET, FILM COATED ORAL at 22:43

## 2020-05-30 RX ADMIN — METOPROLOL TARTRATE SCH MG: 25 TABLET, FILM COATED ORAL at 22:43

## 2020-05-30 RX ADMIN — HEPARIN SODIUM AND DEXTROSE SCH MLS/HR: 10000; 5 INJECTION INTRAVENOUS at 19:52

## 2020-05-31 VITALS — DIASTOLIC BLOOD PRESSURE: 105 MMHG | SYSTOLIC BLOOD PRESSURE: 164 MMHG

## 2020-05-31 VITALS — SYSTOLIC BLOOD PRESSURE: 164 MMHG | DIASTOLIC BLOOD PRESSURE: 105 MMHG

## 2020-05-31 LAB
APTT PPP: 53.5 SEC (ref 23.64–32.05)
APTT PPP: 82.3 SEC (ref 23.64–32.05)
APTT PPP: > 139 SEC (ref 23.64–32.05)
CHOLEST SERPL-MCNC: 93 MG/DL (ref ?–200)
HCT VFR BLD AUTO: 39.2 % (ref 41–53)
HCT VFR BLD AUTO: 39.6 % (ref 41–53)
HDLC SERPL-MCNC: 32 MG/DL (ref 40–59)
HGB BLD-MCNC: 12.8 G/DL (ref 13.5–17.5)
HGB BLD-MCNC: 12.9 G/DL (ref 13.5–17.5)
INR PPP: 1.13 (ref 0.9–1.15)
INR PPP: 1.14 (ref 0.9–1.15)
INR PPP: 1.21 (ref 0.9–1.15)
MCH RBC QN AUTO: 30.6 PG (ref 28–32)
MCH RBC QN AUTO: 30.7 PG (ref 28–32)
MCV RBC AUTO: 93.6 FL (ref 80–100)
MCV RBC AUTO: 94 FL (ref 80–100)
NRBC BLD QL AUTO: 0 %
NRBC BLD QL AUTO: 0.1 %
PROT UR-MCNC: 12.6 MG/DL (ref 0–11.9)
TRIGL SERPL-MCNC: 75 MG/DL (ref ?–150)

## 2020-05-31 RX ADMIN — OXYCODONE HYDROCHLORIDE AND ACETAMINOPHEN PRN TAB: 5; 325 TABLET ORAL at 18:39

## 2020-05-31 RX ADMIN — ATORVASTATIN CALCIUM SCH MG: 20 TABLET, FILM COATED ORAL at 21:50

## 2020-05-31 RX ADMIN — ATORVASTATIN CALCIUM SCH MG: 20 TABLET, FILM COATED ORAL at 00:27

## 2020-05-31 RX ADMIN — SODIUM CHLORIDE SCH MLS/HR: 0.9 INJECTION, SOLUTION INTRAVENOUS at 20:40

## 2020-05-31 RX ADMIN — METOPROLOL TARTRATE SCH MG: 25 TABLET, FILM COATED ORAL at 21:53

## 2020-05-31 RX ADMIN — OXYCODONE HYDROCHLORIDE AND ACETAMINOPHEN PRN TAB: 5; 325 TABLET ORAL at 00:27

## 2020-05-31 RX ADMIN — RANOLAZINE SCH MG: 500 TABLET, FILM COATED, EXTENDED RELEASE ORAL at 21:54

## 2020-05-31 RX ADMIN — MORPHINE SULFATE PRN MG: 2 INJECTION, SOLUTION INTRAMUSCULAR; INTRAVENOUS at 05:27

## 2020-05-31 RX ADMIN — OXYCODONE HYDROCHLORIDE AND ACETAMINOPHEN PRN TAB: 5; 325 TABLET ORAL at 12:24

## 2020-05-31 RX ADMIN — METOPROLOL TARTRATE SCH MG: 25 TABLET, FILM COATED ORAL at 10:00

## 2020-05-31 RX ADMIN — FAMOTIDINE SCH MG: 20 TABLET, FILM COATED ORAL at 21:54

## 2020-05-31 RX ADMIN — MORPHINE SULFATE PRN MG: 2 INJECTION, SOLUTION INTRAMUSCULAR; INTRAVENOUS at 22:27

## 2020-05-31 RX ADMIN — SODIUM CHLORIDE SCH MLS/HR: 0.9 INJECTION, SOLUTION INTRAVENOUS at 17:58

## 2020-05-31 RX ADMIN — FAMOTIDINE SCH MG: 20 TABLET, FILM COATED ORAL at 00:26

## 2020-05-31 RX ADMIN — PROMETHAZINE HYDROCHLORIDE PRN MG: 25 INJECTION INTRAMUSCULAR; INTRAVENOUS at 05:27

## 2020-05-31 RX ADMIN — PROMETHAZINE HYDROCHLORIDE PRN MG: 25 INJECTION INTRAMUSCULAR; INTRAVENOUS at 22:27

## 2020-05-31 RX ADMIN — HEPARIN SODIUM AND DEXTROSE SCH MLS/HR: 10000; 5 INJECTION INTRAVENOUS at 21:28

## 2020-05-31 RX ADMIN — METOPROLOL TARTRATE SCH MG: 25 TABLET, FILM COATED ORAL at 00:26

## 2020-05-31 RX ADMIN — NITROGLYCERIN SCH PATCH: 0.2 PATCH TRANSDERMAL at 10:07

## 2020-05-31 RX ADMIN — MORPHINE SULFATE PRN MG: 2 INJECTION, SOLUTION INTRAMUSCULAR; INTRAVENOUS at 10:09

## 2020-06-01 VITALS — SYSTOLIC BLOOD PRESSURE: 186 MMHG | DIASTOLIC BLOOD PRESSURE: 94 MMHG

## 2020-06-01 VITALS — DIASTOLIC BLOOD PRESSURE: 105 MMHG | SYSTOLIC BLOOD PRESSURE: 208 MMHG

## 2020-06-01 VITALS — SYSTOLIC BLOOD PRESSURE: 154 MMHG | DIASTOLIC BLOOD PRESSURE: 99 MMHG

## 2020-06-01 VITALS — DIASTOLIC BLOOD PRESSURE: 102 MMHG | SYSTOLIC BLOOD PRESSURE: 156 MMHG

## 2020-06-01 LAB
ANION GAP SERPL CALCULATED.3IONS-SCNC: 6 MMOL/L (ref 5–15)
APTT PPP: 35.9 SEC (ref 23.64–32.05)
APTT PPP: 41.6 SEC (ref 23.64–32.05)
BUN SERPL-MCNC: 18 MG/DL (ref 7–18)
BUN/CREAT SERPL: 9.8
CALCIUM SERPL-MCNC: 8.5 MG/DL (ref 8.5–10.1)
CHLORIDE SERPL-SCNC: 111 MMOL/L (ref 98–107)
CO2 SERPL-SCNC: 22 MMOL/L (ref 21–32)
GLUCOSE SERPL-MCNC: 81 MG/DL (ref 74–106)
HCT VFR BLD AUTO: 42.7 % (ref 41–53)
HGB BLD-MCNC: 14.5 G/DL (ref 13.5–17.5)
INR PPP: 1.06 (ref 0.9–1.15)
INR PPP: 1.08 (ref 0.9–1.15)
MCH RBC QN AUTO: 31.2 PG (ref 28–32)
MCV RBC AUTO: 91.9 FL (ref 80–100)
NRBC BLD QL AUTO: 0.1 %
POTASSIUM SERPL-SCNC: 3.7 MMOL/L (ref 3.5–5.1)
SODIUM SERPL-SCNC: 139 MMOL/L (ref 136–145)
URATE SERPL-MCNC: 8 MG/DL (ref 3.5–7.2)

## 2020-06-01 RX ADMIN — METOPROLOL TARTRATE SCH MG: 25 TABLET, FILM COATED ORAL at 09:18

## 2020-06-01 RX ADMIN — RANOLAZINE SCH MG: 500 TABLET, FILM COATED, EXTENDED RELEASE ORAL at 09:19

## 2020-06-01 RX ADMIN — SODIUM CHLORIDE SCH MLS/HR: 0.9 INJECTION, SOLUTION INTRAVENOUS at 09:27

## 2020-06-01 RX ADMIN — MORPHINE SULFATE PRN MG: 2 INJECTION, SOLUTION INTRAMUSCULAR; INTRAVENOUS at 15:17

## 2020-06-01 RX ADMIN — ISOSORBIDE DINITRATE SCH MG: 10 TABLET ORAL at 12:00

## 2020-06-01 RX ADMIN — NITROGLYCERIN SCH PATCH: 0.2 PATCH TRANSDERMAL at 09:21

## 2020-06-01 RX ADMIN — MORPHINE SULFATE PRN MG: 2 INJECTION, SOLUTION INTRAMUSCULAR; INTRAVENOUS at 03:35

## 2020-06-14 ENCOUNTER — HOSPITAL ENCOUNTER (EMERGENCY)
Dept: HOSPITAL 15 - ER | Age: 55
Discharge: LEFT BEFORE DISCHARGE | End: 2020-06-14
Payer: SELF-PAY

## 2020-06-14 VITALS — WEIGHT: 182 LBS | BODY MASS INDEX: 29.25 KG/M2 | HEIGHT: 66 IN

## 2020-06-14 VITALS — DIASTOLIC BLOOD PRESSURE: 104 MMHG | SYSTOLIC BLOOD PRESSURE: 172 MMHG

## 2020-06-14 DIAGNOSIS — M25.552: ICD-10-CM

## 2020-06-14 DIAGNOSIS — Z79.82: ICD-10-CM

## 2020-06-14 DIAGNOSIS — E78.5: ICD-10-CM

## 2020-06-14 DIAGNOSIS — I10: ICD-10-CM

## 2020-06-14 DIAGNOSIS — R53.83: ICD-10-CM

## 2020-06-14 DIAGNOSIS — Z79.899: ICD-10-CM

## 2020-06-14 DIAGNOSIS — R07.89: Primary | ICD-10-CM

## 2020-06-14 LAB
ALBUMIN SERPL-MCNC: 4.2 G/DL (ref 3.4–5)
ALP SERPL-CCNC: 47 U/L (ref 45–117)
ALT SERPL-CCNC: 23 U/L (ref 16–61)
ANION GAP SERPL CALCULATED.3IONS-SCNC: 5 MMOL/L (ref 5–15)
APTT PPP: 27.7 SEC (ref 23.64–32.05)
BILIRUB SERPL-MCNC: 0.6 MG/DL (ref 0.2–1)
BUN SERPL-MCNC: 20 MG/DL (ref 7–18)
BUN/CREAT SERPL: 10.2
CALCIUM SERPL-MCNC: 8.5 MG/DL (ref 8.5–10.1)
CHLORIDE SERPL-SCNC: 109 MMOL/L (ref 98–107)
CO2 SERPL-SCNC: 25 MMOL/L (ref 21–32)
GLUCOSE SERPL-MCNC: 110 MG/DL (ref 74–106)
HCT VFR BLD AUTO: 39.7 % (ref 41–53)
HGB BLD-MCNC: 12.9 G/DL (ref 13.5–17.5)
INR PPP: 1.01 (ref 0.9–1.15)
MCH RBC QN AUTO: 30.1 PG (ref 28–32)
MCV RBC AUTO: 92.6 FL (ref 80–100)
NRBC BLD QL AUTO: 0 %
POTASSIUM SERPL-SCNC: 3.7 MMOL/L (ref 3.5–5.1)
PROT SERPL-MCNC: 7.6 G/DL (ref 6.4–8.2)
SODIUM SERPL-SCNC: 139 MMOL/L (ref 136–145)

## 2020-06-14 PROCEDURE — 85730 THROMBOPLASTIN TIME PARTIAL: CPT

## 2020-06-14 PROCEDURE — 85025 COMPLETE CBC W/AUTO DIFF WBC: CPT

## 2020-06-14 PROCEDURE — 93005 ELECTROCARDIOGRAM TRACING: CPT

## 2020-06-14 PROCEDURE — 80053 COMPREHEN METABOLIC PANEL: CPT

## 2020-06-14 PROCEDURE — 84484 ASSAY OF TROPONIN QUANT: CPT

## 2020-06-14 PROCEDURE — 36415 COLL VENOUS BLD VENIPUNCTURE: CPT

## 2020-06-14 PROCEDURE — 85379 FIBRIN DEGRADATION QUANT: CPT

## 2020-06-14 PROCEDURE — 83880 ASSAY OF NATRIURETIC PEPTIDE: CPT

## 2020-06-14 PROCEDURE — 85610 PROTHROMBIN TIME: CPT

## 2020-06-14 PROCEDURE — 71045 X-RAY EXAM CHEST 1 VIEW: CPT

## 2020-06-14 PROCEDURE — 73502 X-RAY EXAM HIP UNI 2-3 VIEWS: CPT

## 2020-06-16 ENCOUNTER — HOSPITAL ENCOUNTER (INPATIENT)
Dept: HOSPITAL 15 - ER | Age: 55
LOS: 2 days | Discharge: ANOTHER HOSPITAL | DRG: 287 | End: 2020-06-18
Attending: FAMILY MEDICINE | Admitting: NURSE PRACTITIONER
Payer: MEDICARE

## 2020-06-16 VITALS — WEIGHT: 190.7 LBS | BODY MASS INDEX: 30.65 KG/M2 | HEIGHT: 66 IN

## 2020-06-16 VITALS — DIASTOLIC BLOOD PRESSURE: 100 MMHG | SYSTOLIC BLOOD PRESSURE: 159 MMHG

## 2020-06-16 VITALS — SYSTOLIC BLOOD PRESSURE: 132 MMHG | DIASTOLIC BLOOD PRESSURE: 69 MMHG

## 2020-06-16 VITALS — SYSTOLIC BLOOD PRESSURE: 159 MMHG | DIASTOLIC BLOOD PRESSURE: 100 MMHG

## 2020-06-16 VITALS — SYSTOLIC BLOOD PRESSURE: 171 MMHG | DIASTOLIC BLOOD PRESSURE: 102 MMHG

## 2020-06-16 DIAGNOSIS — I24.9: Primary | ICD-10-CM

## 2020-06-16 DIAGNOSIS — I12.9: ICD-10-CM

## 2020-06-16 DIAGNOSIS — Z98.1: ICD-10-CM

## 2020-06-16 DIAGNOSIS — M19.90: ICD-10-CM
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
## 2020-06-16 DIAGNOSIS — E78.00: ICD-10-CM

## 2020-06-16 DIAGNOSIS — Z79.899: ICD-10-CM

## 2020-06-16 DIAGNOSIS — I25.2: ICD-10-CM

## 2020-06-16 DIAGNOSIS — Z82.3: ICD-10-CM

## 2020-06-16 DIAGNOSIS — M54.5: ICD-10-CM

## 2020-06-16 DIAGNOSIS — G89.29: ICD-10-CM

## 2020-06-16 DIAGNOSIS — I70.0: ICD-10-CM

## 2020-06-16 DIAGNOSIS — Z82.49: ICD-10-CM

## 2020-06-16 DIAGNOSIS — Z86.73: ICD-10-CM

## 2020-06-16 DIAGNOSIS — N18.3: ICD-10-CM

## 2020-06-16 LAB
ALBUMIN SERPL-MCNC: 4.1 G/DL (ref 3.4–5)
ALP SERPL-CCNC: 45 U/L (ref 45–117)
ALT SERPL-CCNC: 21 U/L (ref 16–61)
ANION GAP SERPL CALCULATED.3IONS-SCNC: 6 MMOL/L (ref 5–15)
APTT PPP: 28.8 SEC (ref 23.64–32.05)
BILIRUB SERPL-MCNC: 0.6 MG/DL (ref 0.2–1)
BUN SERPL-MCNC: 17 MG/DL (ref 7–18)
BUN/CREAT SERPL: 9.7
CALCIUM SERPL-MCNC: 8.3 MG/DL (ref 8.5–10.1)
CHLORIDE SERPL-SCNC: 108 MMOL/L (ref 98–107)
CO2 SERPL-SCNC: 25 MMOL/L (ref 21–32)
GLUCOSE SERPL-MCNC: 87 MG/DL (ref 74–106)
HCT VFR BLD AUTO: 39.6 % (ref 41–53)
HGB BLD-MCNC: 13.1 G/DL (ref 13.5–17.5)
INR PPP: 1.08 (ref 0.9–1.15)
MCH RBC QN AUTO: 30.7 PG (ref 28–32)
MCV RBC AUTO: 92.5 FL (ref 80–100)
NRBC BLD QL AUTO: 0.1 %
POTASSIUM SERPL-SCNC: 3.8 MMOL/L (ref 3.5–5.1)
PROT SERPL-MCNC: 7.4 G/DL (ref 6.4–8.2)
SODIUM SERPL-SCNC: 139 MMOL/L (ref 136–145)

## 2020-06-16 PROCEDURE — 85610 PROTHROMBIN TIME: CPT

## 2020-06-16 PROCEDURE — 85379 FIBRIN DEGRADATION QUANT: CPT

## 2020-06-16 PROCEDURE — 83735 ASSAY OF MAGNESIUM: CPT

## 2020-06-16 PROCEDURE — 96361 HYDRATE IV INFUSION ADD-ON: CPT

## 2020-06-16 PROCEDURE — 71045 X-RAY EXAM CHEST 1 VIEW: CPT

## 2020-06-16 PROCEDURE — 72170 X-RAY EXAM OF PELVIS: CPT

## 2020-06-16 PROCEDURE — 93005 ELECTROCARDIOGRAM TRACING: CPT

## 2020-06-16 PROCEDURE — 74176 CT ABD & PELVIS W/O CONTRAST: CPT

## 2020-06-16 PROCEDURE — 99153 MOD SED SAME PHYS/QHP EA: CPT

## 2020-06-16 PROCEDURE — 86141 C-REACTIVE PROTEIN HS: CPT

## 2020-06-16 PROCEDURE — 85025 COMPLETE CBC W/AUTO DIFF WBC: CPT

## 2020-06-16 PROCEDURE — 93458 L HRT ARTERY/VENTRICLE ANGIO: CPT

## 2020-06-16 PROCEDURE — 80053 COMPREHEN METABOLIC PANEL: CPT

## 2020-06-16 PROCEDURE — 96374 THER/PROPH/DIAG INJ IV PUSH: CPT

## 2020-06-16 PROCEDURE — 99152 MOD SED SAME PHYS/QHP 5/>YRS: CPT

## 2020-06-16 PROCEDURE — 92978 ENDOLUMINL IVUS OCT C 1ST: CPT

## 2020-06-16 PROCEDURE — 80048 BASIC METABOLIC PNL TOTAL CA: CPT

## 2020-06-16 PROCEDURE — 85730 THROMBOPLASTIN TIME PARTIAL: CPT

## 2020-06-16 PROCEDURE — 84484 ASSAY OF TROPONIN QUANT: CPT

## 2020-06-16 PROCEDURE — 96375 TX/PRO/DX INJ NEW DRUG ADDON: CPT

## 2020-06-16 PROCEDURE — 36415 COLL VENOUS BLD VENIPUNCTURE: CPT

## 2020-06-16 PROCEDURE — 87081 CULTURE SCREEN ONLY: CPT

## 2020-06-16 RX ADMIN — MORPHINE SULFATE PRN MG: 2 INJECTION, SOLUTION INTRAMUSCULAR; INTRAVENOUS at 21:50

## 2020-06-16 RX ADMIN — MORPHINE SULFATE PRN MG: 2 INJECTION, SOLUTION INTRAMUSCULAR; INTRAVENOUS at 14:46

## 2020-06-16 RX ADMIN — GABAPENTIN SCH MG: 300 CAPSULE ORAL at 14:13

## 2020-06-16 RX ADMIN — ATORVASTATIN CALCIUM SCH MG: 20 TABLET, FILM COATED ORAL at 21:48

## 2020-06-16 RX ADMIN — MORPHINE SULFATE PRN MG: 2 INJECTION, SOLUTION INTRAMUSCULAR; INTRAVENOUS at 18:40

## 2020-06-16 RX ADMIN — GABAPENTIN SCH MG: 300 CAPSULE ORAL at 21:49

## 2020-06-16 RX ADMIN — SODIUM CHLORIDE SCH MLS/HR: 0.9 INJECTION, SOLUTION INTRAVENOUS at 12:15

## 2020-06-16 RX ADMIN — ACETYLCYSTEINE SCH MG: 200 INHALANT RESPIRATORY (INHALATION) at 21:49

## 2020-06-16 RX ADMIN — METOPROLOL TARTRATE SCH MG: 25 TABLET, FILM COATED ORAL at 21:49

## 2020-06-16 RX ADMIN — HYDROCODONE BITARTRATE AND ACETAMINOPHEN PRN TAB: 5; 325 TABLET ORAL at 12:19

## 2020-06-16 RX ADMIN — HYDRALAZINE HYDROCHLORIDE PRN MG: 20 INJECTION INTRAMUSCULAR; INTRAVENOUS at 17:35

## 2020-06-16 NOTE — NUR
PATIENT ARRIVED TO ROOM VIA WHEELCHAIR FROM ER. PATIENT ORIENTED TO ROOM. CALL LIGHT PLACED 
BEDSIDE, BED IN LOCKED AND LOWEST POSITION WITH 2X SIDE RAILS UP. PATIENT /100, 60, 
99, 16, 97.6. NO COMPLAINTS OF DISCOMFORT AT THIS TIME. WILL CONTINUE TO MONITOR Q1H AND 
PRN.

## 2020-06-16 NOTE — NUR
BLOOD PRESSURE



PATIENT BLOOD PRESSURE 159/100. ADMINISTERING PRN APRESOLINE. WILL CONTINUE TO MONITOR

## 2020-06-16 NOTE — NUR
Opening Shift Note

Assumed care of patient, patient is AOx4. No S/S of distress/SOB or pain. Patient 
repositioned for comfort. Instructed patient on his POC and to call for assist, patient 
verbalized understanding and in agreement. Call light within reach and able to use. Will 
continue to monitor for changes. Bed in lowest position, HOB at 30 degrees, side rails up 
x2.

## 2020-06-16 NOTE — NUR
Patient Complains of Pain

Patient complains of pain of 9/10 on whole left side (arm and leg). Patient given pain 
medication as prescribed. Will continue to monitor.

## 2020-06-17 VITALS — DIASTOLIC BLOOD PRESSURE: 82 MMHG | SYSTOLIC BLOOD PRESSURE: 160 MMHG

## 2020-06-17 VITALS — SYSTOLIC BLOOD PRESSURE: 154 MMHG | DIASTOLIC BLOOD PRESSURE: 94 MMHG

## 2020-06-17 VITALS — SYSTOLIC BLOOD PRESSURE: 185 MMHG | DIASTOLIC BLOOD PRESSURE: 123 MMHG

## 2020-06-17 VITALS — DIASTOLIC BLOOD PRESSURE: 87 MMHG | SYSTOLIC BLOOD PRESSURE: 160 MMHG

## 2020-06-17 VITALS — DIASTOLIC BLOOD PRESSURE: 114 MMHG | SYSTOLIC BLOOD PRESSURE: 157 MMHG

## 2020-06-17 LAB
ANION GAP SERPL CALCULATED.3IONS-SCNC: 7 MMOL/L (ref 5–15)
APTT PPP: 29.1 SEC (ref 23.64–32.05)
BUN SERPL-MCNC: 15 MG/DL (ref 7–18)
BUN/CREAT SERPL: 9.4
CALCIUM SERPL-MCNC: 8.4 MG/DL (ref 8.5–10.1)
CHLORIDE SERPL-SCNC: 110 MMOL/L (ref 98–107)
CO2 SERPL-SCNC: 24 MMOL/L (ref 21–32)
GLUCOSE SERPL-MCNC: 80 MG/DL (ref 74–106)
HCT VFR BLD AUTO: 34.3 % (ref 41–53)
HGB BLD-MCNC: 11.5 G/DL (ref 13.5–17.5)
INR PPP: 1.16 (ref 0.9–1.15)
MCH RBC QN AUTO: 31.1 PG (ref 28–32)
MCV RBC AUTO: 92.6 FL (ref 80–100)
NRBC BLD QL AUTO: 0.1 %
POTASSIUM SERPL-SCNC: 3.9 MMOL/L (ref 3.5–5.1)
SODIUM SERPL-SCNC: 141 MMOL/L (ref 136–145)

## 2020-06-17 PROCEDURE — B215YZZ FLUOROSCOPY OF LEFT HEART USING OTHER CONTRAST: ICD-10-PCS | Performed by: INTERNAL MEDICINE

## 2020-06-17 PROCEDURE — B240ZZ3 ULTRASONOGRAPHY OF SINGLE CORONARY ARTERY, INTRAVASCULAR: ICD-10-PCS | Performed by: INTERNAL MEDICINE

## 2020-06-17 PROCEDURE — 4A023N7 MEASUREMENT OF CARDIAC SAMPLING AND PRESSURE, LEFT HEART, PERCUTANEOUS APPROACH: ICD-10-PCS | Performed by: INTERNAL MEDICINE

## 2020-06-17 PROCEDURE — B211YZZ FLUOROSCOPY OF MULTIPLE CORONARY ARTERIES USING OTHER CONTRAST: ICD-10-PCS | Performed by: INTERNAL MEDICINE

## 2020-06-17 RX ADMIN — MORPHINE SULFATE PRN MG: 2 INJECTION, SOLUTION INTRAMUSCULAR; INTRAVENOUS at 11:36

## 2020-06-17 RX ADMIN — HYDRALAZINE HYDROCHLORIDE PRN MG: 20 INJECTION INTRAMUSCULAR; INTRAVENOUS at 17:11

## 2020-06-17 RX ADMIN — GABAPENTIN SCH MG: 300 CAPSULE ORAL at 14:00

## 2020-06-17 RX ADMIN — LISINOPRIL SCH MG: 10 TABLET ORAL at 09:25

## 2020-06-17 RX ADMIN — METOPROLOL TARTRATE SCH MG: 25 TABLET, FILM COATED ORAL at 22:20

## 2020-06-17 RX ADMIN — SODIUM CHLORIDE SCH MLS/HR: 0.9 INJECTION, SOLUTION INTRAVENOUS at 21:20

## 2020-06-17 RX ADMIN — ATORVASTATIN CALCIUM SCH MG: 20 TABLET, FILM COATED ORAL at 22:19

## 2020-06-17 RX ADMIN — HYDRALAZINE HYDROCHLORIDE PRN MG: 20 INJECTION INTRAMUSCULAR; INTRAVENOUS at 11:35

## 2020-06-17 RX ADMIN — GABAPENTIN SCH MG: 300 CAPSULE ORAL at 06:00

## 2020-06-17 RX ADMIN — MORPHINE SULFATE PRN MG: 2 INJECTION, SOLUTION INTRAMUSCULAR; INTRAVENOUS at 17:15

## 2020-06-17 RX ADMIN — METOPROLOL TARTRATE SCH MG: 25 TABLET, FILM COATED ORAL at 09:26

## 2020-06-17 RX ADMIN — ACETYLCYSTEINE SCH MG: 200 INHALANT RESPIRATORY (INHALATION) at 09:40

## 2020-06-17 RX ADMIN — ACETYLCYSTEINE SCH MG: 200 INHALANT RESPIRATORY (INHALATION) at 23:12

## 2020-06-17 RX ADMIN — HYDROCODONE BITARTRATE AND ACETAMINOPHEN PRN TAB: 5; 325 TABLET ORAL at 09:25

## 2020-06-17 RX ADMIN — GABAPENTIN SCH MG: 300 CAPSULE ORAL at 22:19

## 2020-06-17 RX ADMIN — FAMOTIDINE SCH MG: 20 TABLET, FILM COATED ORAL at 09:25

## 2020-06-17 RX ADMIN — HYDROCODONE BITARTRATE AND ACETAMINOPHEN PRN TAB: 5; 325 TABLET ORAL at 00:38

## 2020-06-17 RX ADMIN — MORPHINE SULFATE PRN MG: 2 INJECTION, SOLUTION INTRAMUSCULAR; INTRAVENOUS at 07:01

## 2020-06-17 RX ADMIN — MORPHINE SULFATE PRN MG: 2 INJECTION, SOLUTION INTRAMUSCULAR; INTRAVENOUS at 22:20

## 2020-06-17 RX ADMIN — MORPHINE SULFATE PRN MG: 2 INJECTION, SOLUTION INTRAMUSCULAR; INTRAVENOUS at 02:50

## 2020-06-17 NOTE — NUR
Pain

Patient c/o 8/10 generalized back pain. Administered 1 mg morphine per EMAR. Will continue 
to monitor.

## 2020-06-17 NOTE — NUR
End of Shift Note

Endorsed care to dayshift nurse. At this time patient has no s/s of distress or SOB.

## 2020-06-17 NOTE — NUR
Patient Moved From RM#214-A to RM#216-B 

All belongings moved with patient to room 216-B. Patient tolerated move well via wheelchair.

## 2020-06-17 NOTE — NUR
PROCEDURE



Pt taken to Cath Lab for Left Heart Catheterization procedure. Consents signed and chart 
given to MARIO Lopez. Pt's respirations are even and unlabored, no distress noted at this 
time. Pt has been medicated with Apresoline and Morphine as ordered and Jessica aware of pt's 
increased BP and will assess. All belongings placed in side  pt's room prior to 
transport. Will resume care on arrival.

## 2020-06-17 NOTE — NUR
Spoke to Cardiac Surgeon Dr Salinas. He states he has talked to Dr. Hickman and patient will 
be transferred tomorrow to [facility]. He states to make sure copy of CD gets 
sent with patient. MD states he has notified his case management team and they will be 
contacting [facility]  tomorrow for transfer. Dr. Salinas provided his cellphone 
number [phone] and states he can be contacted for questions. This rn spoke to Dr. Hcikman 
and new orders received for social service consult for higher level care transfer and Dr. Hickman states that he will talk to Dr. Manuel tomorrow and update him on POC. Vijaya  
paged to notify

## 2020-06-17 NOTE — NUR
Patient back from cath lab

report received from Jessica mederos she states patient is a candidate for MVD she states Dr. Hickman is communicating with Dr. Salinas but no new orders for transfer received at this 
time. Per Jessica mederos, start removing 2ml air from vasc band now and q15min until fully 
deflated. Patient's BP noted increased and req pain meds. Will medicate as ordered. Vasc 
band noted to right wrist with old drainage. No s/s of hematoma or bleeding noted at this 
time. Will cont to monitor.

## 2020-06-17 NOTE — NUR
Pain reassessed

Patient resting with eyes closed, respirations even and non-labored with no s/s of distress. 
Will continue to monitor.

## 2020-06-17 NOTE — NUR
Regarding Mucomyst

per Hali rn in cath okay to give mucomyst PO with OJ prior to LHC. Will give as ordered 
and bring pt down to cath lab by [id number].

## 2020-06-17 NOTE — NUR
Pain medication for left leg and left arm pain. Given medication as prescribed. Will 
continue to monitor.

## 2020-06-17 NOTE — NUR
Patient care endorsed 

endorsed care to Miracle rn. Patient laying in bed in no acute distress or sob. Vasc band to 
right wrist complete removed after 2ml air were removed [id number] as ordered. 2X2 sterile 
gauze and tegaderm applied to site. No s/s of hematoma or bleeding noted. Radial pulses 
palpable bilat. Patient instructed to notify primary nursing if symptoms of bleeding occur 
he verbalized understanding. Call light within reach. Copy of CD placed in front of the 
chart and rn informed to endorse to oncoming nurse in the a.m. for transfer to higher level 
care hospital.

## 2020-06-17 NOTE — NUR
Opening shift note

Assumed care of patient who is A&Ox4. Respirations even and non-labored with no s/s of 
distress. Discussed POC with patient who verbalized understanding. IVs patent and intact, 
running NS at 200. Vasc band dressing dry and intact with no signs of swelling or hematoma. 
Bed in lowest locked position with 2 side rails up. Call light within reach, will continue 
to monitor.

-------------------------------------------------------------------------------

Addendum: 06/18/20 at 0117 by CHECO SPICER RN RN

-------------------------------------------------------------------------------

Wrong time: Assumed patient care at 1920

## 2020-06-17 NOTE — NUR
Patient Complains of Pain 

Patient still uncomfortable and requested Norco as prescribed for pain. Will continue to 
monitor patient.

## 2020-06-18 VITALS — DIASTOLIC BLOOD PRESSURE: 93 MMHG | SYSTOLIC BLOOD PRESSURE: 159 MMHG

## 2020-06-18 VITALS — DIASTOLIC BLOOD PRESSURE: 101 MMHG | SYSTOLIC BLOOD PRESSURE: 160 MMHG

## 2020-06-18 VITALS — DIASTOLIC BLOOD PRESSURE: 92 MMHG | SYSTOLIC BLOOD PRESSURE: 151 MMHG

## 2020-06-18 RX ADMIN — MORPHINE SULFATE PRN MG: 2 INJECTION, SOLUTION INTRAMUSCULAR; INTRAVENOUS at 12:33

## 2020-06-18 RX ADMIN — ACETYLCYSTEINE SCH MG: 200 INHALANT RESPIRATORY (INHALATION) at 09:28

## 2020-06-18 RX ADMIN — MORPHINE SULFATE PRN MG: 2 INJECTION, SOLUTION INTRAMUSCULAR; INTRAVENOUS at 08:01

## 2020-06-18 RX ADMIN — FAMOTIDINE SCH MG: 20 TABLET, FILM COATED ORAL at 09:24

## 2020-06-18 RX ADMIN — GABAPENTIN SCH MG: 300 CAPSULE ORAL at 13:59

## 2020-06-18 RX ADMIN — LISINOPRIL SCH MG: 10 TABLET ORAL at 09:24

## 2020-06-18 RX ADMIN — GABAPENTIN SCH MG: 300 CAPSULE ORAL at 06:19

## 2020-06-18 RX ADMIN — MORPHINE SULFATE PRN MG: 2 INJECTION, SOLUTION INTRAMUSCULAR; INTRAVENOUS at 03:27

## 2020-06-18 RX ADMIN — METOPROLOL TARTRATE SCH MG: 25 TABLET, FILM COATED ORAL at 09:24

## 2020-06-18 NOTE — NUR
Pain reassessed

Patient resting with eyes closed, breathing even and non-labored with no s/s of distress, 
grimacing, or restlessness. Will continue to monitor.

## 2020-06-18 NOTE — NUR
I called [facility] and left message asking about bed availability.  I 
called [facility] and attempted to reach bed control to ask about bed 
availability, unable to leave a message.  I called [facility]-no beds available at this time.  Per 
[facility] they do not have cardio-thoracic services available.

## 2020-06-18 NOTE — NUR
Received a call from Ulysses # [phone] [facility] regarding transfer.  
Informed Vijaya and she will contact her soon.

## 2020-06-18 NOTE — NUR
1037 6/18/20 -  Contacted [facility]  at [phone] spoke with 
Brittni, who stated patient would be going to [facility] room 138- B , nurse's station number is 
[phone].  [facility]  time has been scheduled for 12PM.  Informed nurse caring for 
patient pt will be going to [facility] room 138 A

## 2020-06-18 NOTE — NUR
Discharge instructions given as ordered. Encourage to follow up with PCP, patient is being 
transfer to [facility] as instructed. All questions and concerns 
addressed. Patient verbalized understanding.  Telemetry unit returned to ICU. Patient taken 
to vehicle via gurney with all personal belongings, accompanied by [facility] staff. No distress 
noted at time of departure.

## 2024-10-03 NOTE — NUR
"Subjective:      Patient ID: Jason Gutierrez HAND-OFF: 

Report given to Conor Ramsay RN.   